# Patient Record
Sex: MALE | Race: WHITE | Employment: OTHER | URBAN - METROPOLITAN AREA
[De-identification: names, ages, dates, MRNs, and addresses within clinical notes are randomized per-mention and may not be internally consistent; named-entity substitution may affect disease eponyms.]

---

## 2017-01-17 ENCOUNTER — OFFICE VISIT (OUTPATIENT)
Dept: INTERNAL MEDICINE CLINIC | Age: 57
End: 2017-01-17

## 2017-01-17 VITALS
RESPIRATION RATE: 16 BRPM | OXYGEN SATURATION: 97 % | TEMPERATURE: 98.9 F | BODY MASS INDEX: 25.84 KG/M2 | HEART RATE: 78 BPM | SYSTOLIC BLOOD PRESSURE: 120 MMHG | WEIGHT: 195 LBS | DIASTOLIC BLOOD PRESSURE: 88 MMHG | HEIGHT: 73 IN

## 2017-01-17 DIAGNOSIS — R07.9 CHEST PAIN, UNSPECIFIED TYPE: Primary | ICD-10-CM

## 2017-01-17 RX ORDER — NAPROXEN 500 MG/1
TABLET ORAL
Qty: 20 TAB | Refills: 0 | Status: SHIPPED | OUTPATIENT
Start: 2017-01-17 | End: 2017-09-18

## 2017-01-17 NOTE — PATIENT INSTRUCTIONS
Musculoskeletal Chest Pain: Care Instructions  Your Care Instructions  Chest pain is not always a sign that something is wrong with your heart or that you have another serious problem. The doctor thinks your chest pain is caused by strained muscles or ligaments, inflamed chest cartilage, or another problem in your chest, rather than by your heart. You may need more tests to find the cause of your chest pain. Follow-up care is a key part of your treatment and safety. Be sure to make and go to all appointments, and call your doctor if you are having problems. Its also a good idea to know your test results and keep a list of the medicines you take. How can you care for yourself at home? · Take pain medicines exactly as directed. ¨ If the doctor gave you a prescription medicine for pain, take it as prescribed. ¨ If you are not taking a prescription pain medicine, ask your doctor if you can take an over-the-counter medicine. · Rest and protect the sore area. · Stop, change, or take a break from any activity that may be causing your pain or soreness. · Put ice or a cold pack on the sore area for 10 to 20 minutes at a time. Try to do this every 1 to 2 hours for the next 3 days (when you are awake) or until the swelling goes down. Put a thin cloth between the ice and your skin. · After 2 or 3 days, apply a heating pad set on low or a warm cloth to the area that hurts. Some doctors suggest that you go back and forth between hot and cold. · Do not wrap or tape your ribs for support. This may cause you to take smaller breaths, which could increase your risk of lung problems. · Mentholated creams such as Bengay or Icy Hot may soothe sore muscles. Follow the instructions on the package. · Follow your doctor's instructions for exercising. · Gentle stretching and massage may help you get better faster. Stretch slowly to the point just before pain begins, and hold the stretch for at least 15 to 30 seconds.  Do this 3 or 4 times a day. Stretch just after you have applied heat. · As your pain gets better, slowly return to your normal activities. Any increased pain may be a sign that you need to rest a while longer. When should you call for help? Call 911 anytime you think you may need emergency care. For example, call if:  · You have chest pain or pressure. This may occur with:  ¨ Sweating. ¨ Shortness of breath. ¨ Nausea or vomiting. ¨ Pain that spreads from the chest to the neck, jaw, or one or both shoulders or arms. ¨ Dizziness or lightheadedness. ¨ A fast or uneven pulse. After calling 911, chew 1 adult-strength aspirin. Wait for an ambulance. Do not try to drive yourself. · You have sudden chest pain and shortness of breath, or you cough up blood. Call your doctor now or seek immediate medical care if:  · You have any trouble breathing. · Your chest pain gets worse. · Your chest pain occurs consistently with exercise and is relieved by rest.  Watch closely for changes in your health, and be sure to contact your doctor if:  · Your chest pain does not get better after 1 week. Where can you learn more? Go to http://garfield-tyler.info/. Enter V293 in the search box to learn more about \"Musculoskeletal Chest Pain: Care Instructions. \"  Current as of: May 27, 2016  Content Version: 11.1  © 4194-5588 Healthwise, Incorporated. Care instructions adapted under license by SoundCloud (which disclaims liability or warranty for this information). If you have questions about a medical condition or this instruction, always ask your healthcare professional. [place] [address] any warranty or liability for your use of this information.

## 2017-01-17 NOTE — PROGRESS NOTES
1. Have you been to the ER, urgent care clinic since your last visit? Hospitalized since your last visit? No    2. Have you seen or consulted any other health care providers outside of the [address] [place] [address] since your last visit? Include any pap smears or colon screening.  No

## 2017-01-17 NOTE — MR AVS SNAPSHOT
Visit Information Date & Time Provider Department Dept. Phone Encounter #  
 1/17/2017  9:45 AM Clarice Waite MD [facility]ICINE OF [facility] Galan [phone] [id number] Upcoming Health Maintenance Date Due Hepatitis C Screening 1960 COLONOSCOPY 12/1/2017 DTaP/Tdap/Td series (2 - Td) 8/13/2022 Allergies as of 1/17/2017  Review Complete On: 11/11/2016 By: Clarice Waite MD  
  
 Severity Noted Reaction Type Reactions Morphine Medium 08/13/2012   Systemic Other (comments) Hallucinations Current Immunizations  Never Reviewed Name Date Influenza Vaccine M[place]ord Going) 9/11/2015 Influenza Vaccine (Quad) PF 9/23/2016 TDAP Vaccine 8/13/2012  6:03 PM  
  
 Not reviewed this visit You Were Diagnosed With   
  
 Codes Comments Chest pain, unspecified type    -  Primary ICD-10-CM: R07.9 ICD-9-CM: 786.50 Vitals BP Pulse Temp Resp Height(growth percentile) 120/88 (BP 1 Location: Right arm, BP Patient Position: Sitting) 78 98.9 °F (37.2 °C) (Tympanic) 16 6' 1\" (1.854 m) Weight(growth percentile) SpO2 BMI Smoking Status 195 lb (88.5 kg) 97% 25.73 kg/m2 Never Smoker BMI and BSA Data Body Mass Index Body Surface Area 25.73 kg/m 2 2.14 m 2 Preferred Pharmacy Pharmacy Name Phone [facility] [address] [place] [address] 32 Your Updated Medication List  
  
   
This list is accurate as of: 1/17/17 10:08 AM.  Always use your most recent med list.  
  
  
  
  
 EXCEDRIN MIGRAINE PO Take  by mouth three (3) times daily as needed. fluocinoNIDE 0.05 % topical cream  
Commonly known as:  LIDEX Apply small amount to thorax/arms/legs tid prn rash  
  
 hydrOXYzine HCl 25 mg tablet Commonly known as:  ATARAX  
1 tid prn rash  
  
 pravastatin 40 mg tablet Commonly known as:  PRAVACHOL Take 1 Tab by mouth nightly. We Performed the Following AMB POC EKG ROUTINE W/ 12 LEADS, INTER & REP [00722 CPT(R)] Introducing [facility]! Edwar Last introduces [employer] patient portal. Now you can access parts of your medical record, email your doctor's office, and request medication refills online. 1. In your internet browser, go to [email or web address] 2. Click on the First Time User? Click Here link in the Sign In box. You will see the New Member Sign Up page. 3. Enter your [employer] Access Code exactly as it appears below. You will not need to use this code after youve completed the sign-up process. If you do not sign up before the expiration date, you must request a new code. · [employer] Access Code: [email or web address] Expires: 4/17/2017  9:45 AM 
 
4. Enter the last four digits of your Social Security Number (xxxx) and Date of Birth (mm/dd/yyyy) as indicated and click Submit. You will be taken to the next sign-up page. 5. Create a [employer] ID. This will be your [employer] login ID and cannot be changed, so think of one that is secure and easy to remember. 6. Create a [employer] password. You can change your password at any time. 7. Enter your Password Reset Question and Answer. This can be used at a later time if you forget your password. 8. Enter your e-mail address. You will receive e-mail notification when new information is available in [address] 9. Click Sign Up. You can now view and download portions of your medical record. 10. Click the Download Summary menu link to download a portable copy of your medical information. If you have questions, please visit the Frequently Asked Questions section of the [employer] website. Remember, [employer] is NOT to be used for urgent needs. For medical emergencies, dial 911. Now available from your iPhone and Android! Please provide this summary of care documentation to your next provider. Your primary care clinician is listed as Nael Oconnor. If you have any questions after today's visit, please call [phone].

## 2017-01-17 NOTE — PROGRESS NOTES
HPI:   1 week of intermittent nonexertional CP w/o SOB, PUD sxs, cough; pain (L) ant chest and aggravated by positional change; lasts seconds at a time    ROS is otherwise negative. Past Medical History   Diagnosis Date    Generalized osteoarthrosis, involving multiple sites     Headache(784.0) April 2011    Low back pain     Lumbar postlaminectomy syndrome     Mixed hyperlipidemia     Neck pain      with headaches    Right arm pain April 2011       Past Surgical History   Procedure Laterality Date    Hx lumbar fusion       L4-L5 with instrumentation    Hx cervical fusion       C3-C4    Hx tonsil and adenoidectomy      Hx colonoscopy  12/2012     polyps, diverticula       Social History     Social History    Marital status:      Spouse name: N/A    Number of children: N/A    Years of education: N/A     Occupational History    Not on file. Social History Main Topics    Smoking status: Never Smoker    Smokeless tobacco: Not on file    Alcohol use 0.0 oz/week     0 Standard drinks or equivalent per week    Drug use: No    Sexual activity: Not on file     Other Topics Concern    Not on file     Social History Narrative       Allergies   Allergen Reactions    Morphine Other (comments)     Hallucinations       Family History   Problem Relation Age of Onset    Cancer Neg Hx     Diabetes Neg Hx     Heart Disease Neg Hx     Hypertension Neg Hx     Stroke Neg Hx        Current Outpatient Prescriptions   Medication Sig Dispense Refill    hydrOXYzine (ATARAX) 25 mg tablet 1 tid prn rash 40 Tab 1    fluocinoNIDE (LIDEX) 0.05 % topical cream Apply small amount to thorax/arms/legs tid prn rash 15 g 1    pravastatin (PRAVACHOL) 40 mg tablet Take 1 Tab by mouth nightly. 90 Tab 3    ASPIRIN/ACETAMINOPHEN/CAFFEINE (EXCEDRIN MIGRAINE PO) Take  by mouth three (3) times daily as needed.              Visit Vitals    /88 (BP 1 Location: Right arm, BP Patient Position: Sitting)    Pulse 78    Temp 98.9 °F (37.2 °C) (Tympanic)    Resp 16    Ht 6' 1\" (1.854 m)    Wt 195 lb (88.5 kg)    SpO2 97%    BMI 25.73 kg/m2       PE  Well nourished in NAD  HEENT:   OP: clear. Neck: supple w/o mass or bruits. Chest: clear. + anterior (L) tenderness reproducing pain  CV: RRR w/o m,r,g; pulses intact. Abd: soft, NT, w/o HSM or mass. Ext: w/o edema. Neuro: NF.  ECG: WNL    Assessment and Plan    Encounter Diagnoses   Name Primary?     Chest pain, unspecified type Yes   atypical CP - most likely MSK  Reassured; naprosyn 500 mg bid with food for 10 days  F/U worsening  OV 6 mos or prn  I have explained plan to patient and the patient verbalizes understanding

## 2017-09-18 ENCOUNTER — OFFICE VISIT (OUTPATIENT)
Dept: INTERNAL MEDICINE CLINIC | Age: 57
End: 2017-09-18

## 2017-09-18 VITALS
TEMPERATURE: 98 F | HEIGHT: 73 IN | SYSTOLIC BLOOD PRESSURE: 136 MMHG | BODY MASS INDEX: 25.58 KG/M2 | WEIGHT: 193 LBS | OXYGEN SATURATION: 98 % | DIASTOLIC BLOOD PRESSURE: 82 MMHG | RESPIRATION RATE: 16 BRPM | HEART RATE: 64 BPM

## 2017-09-18 DIAGNOSIS — E78.2 MIXED HYPERLIPIDEMIA: ICD-10-CM

## 2017-09-18 DIAGNOSIS — R11.2 NON-INTRACTABLE VOMITING WITH NAUSEA, UNSPECIFIED VOMITING TYPE: Primary | ICD-10-CM

## 2017-09-18 NOTE — PROGRESS NOTES
1. Have you been to the ER, urgent care clinic since your last visit? Hospitalized since your last visit? No    2. Have you seen or consulted any other health care providers outside of the [address] [place] [address] since your last visit? Include any pap smears or colon screening.  No

## 2017-09-18 NOTE — PATIENT INSTRUCTIONS
Nausea and Vomiting: Care Instructions  Your Care Instructions    When you are nauseated, you may feel weak and sweaty and notice a lot of saliva in your mouth. Nausea often leads to vomiting. Most of the time you do not need to worry about nausea and vomiting, but they can be signs of other illnesses. Two common causes of nausea and vomiting are stomach flu and food poisoning. Nausea and vomiting from viral stomach flu will usually start to improve within 24 hours. Nausea and vomiting from food poisoning may last from 12 to 48 hours. The doctor has checked you carefully, but problems can develop later. If you notice any problems or new symptoms, get medical treatment right away. Follow-up care is a key part of your treatment and safety. Be sure to make and go to all appointments, and call your doctor if you are having problems. It's also a good idea to know your test results and keep a list of the medicines you take. How can you care for yourself at home? · To prevent dehydration, drink plenty of fluids, enough so that your urine is light yellow or clear like water. Choose water and other caffeine-free clear liquids until you feel better. If you have kidney, heart, or liver disease and have to limit fluids, talk with your doctor before you increase the amount of fluids you drink. · Rest in bed until you feel better. · When you are able to eat, try clear soups, mild foods, and liquids until all symptoms are gone for 12 to 48 hours. Other good choices include dry toast, crackers, cooked cereal, and gelatin dessert, such as Jell-O. When should you call for help? Call 911 anytime you think you may need emergency care. For example, call if:  · You passed out (lost consciousness). Call your doctor now or seek immediate medical care if:  · You have symptoms of dehydration, such as:  ¨ Dry eyes and a dry mouth. ¨ Passing only a little dark urine.   ¨ Feeling thirstier than usual.  · You have new or worsening belly pain. · You have a new or higher fever. · You vomit blood or what looks like coffee grounds. Watch closely for changes in your health, and be sure to contact your doctor if:  · You have ongoing nausea and vomiting. · Your vomiting is getting worse. · Your vomiting lasts longer than 2 days. · You are not getting better as expected. Where can you learn more? Go to http://garfield-tyler.info/. Enter 25 [id number] in the search box to learn more about \"Nausea and Vomiting: Care Instructions. \"  Current as of: March 20, 2017  Content Version: 11.3  © 1857-5361 Id[employer]x. Care instructions adapted under license by Riptide IO (which disclaims liability or warranty for this information). If you have questions about a medical condition or this instruction, always ask your healthcare professional. [address] any warranty or liability for your use of this information.

## 2017-10-13 ENCOUNTER — OFFICE VISIT (OUTPATIENT)
Dept: INTERNAL MEDICINE CLINIC | Age: 57
End: 2017-10-13

## 2017-10-13 ENCOUNTER — HOSPITAL ENCOUNTER (OUTPATIENT)
Dept: LAB | Age: 57
Discharge: HOME | End: 2017-10-13
Payer: COMMERCIAL

## 2017-10-13 VITALS
WEIGHT: 197 LBS | HEART RATE: 71 BPM | BODY MASS INDEX: 26.11 KG/M2 | RESPIRATION RATE: 16 BRPM | DIASTOLIC BLOOD PRESSURE: 80 MMHG | OXYGEN SATURATION: 98 % | TEMPERATURE: 97.8 F | HEIGHT: 73 IN | SYSTOLIC BLOOD PRESSURE: 134 MMHG

## 2017-10-13 DIAGNOSIS — Z00.00 ROUTINE GENERAL MEDICAL EXAMINATION AT A HEALTH CARE FACILITY: ICD-10-CM

## 2017-10-13 DIAGNOSIS — R73.02 IGT (IMPAIRED GLUCOSE TOLERANCE): ICD-10-CM

## 2017-10-13 DIAGNOSIS — E78.2 MIXED HYPERLIPIDEMIA: ICD-10-CM

## 2017-10-13 DIAGNOSIS — Z00.00 ROUTINE GENERAL MEDICAL EXAMINATION AT A HEALTH CARE FACILITY: Primary | ICD-10-CM

## 2017-10-13 LAB
ALBUMIN SERPL-MCNC: 3.6 G/DL (ref 3.4–5)
ALBUMIN/GLOB SERPL: 1 {RATIO} (ref 0.8–1.7)
ALP SERPL-CCNC: 105 U/L (ref 45–117)
ALT SERPL-CCNC: 40 U/L (ref 16–61)
ANION GAP SERPL CALC-SCNC: 9 MMOL/L (ref 3–18)
AST SERPL-CCNC: 21 U/L (ref 15–37)
BASOPHILS # BLD: 0 K/UL (ref 0–0.1)
BASOPHILS NFR BLD: 0 % (ref 0–2)
BILIRUB SERPL-MCNC: 0.6 MG/DL (ref 0.2–1)
BUN SERPL-MCNC: 12 MG/DL (ref 7–18)
BUN/CREAT SERPL: 13 (ref 12–20)
CALCIUM SERPL-MCNC: 9 MG/DL (ref 8.5–10.1)
CHLORIDE SERPL-SCNC: 103 MMOL/L (ref 100–108)
CHOLEST SERPL-MCNC: 201 MG/DL
CO2 SERPL-SCNC: 26 MMOL/L (ref 21–32)
CREAT SERPL-MCNC: 0.93 MG/DL (ref 0.6–1.3)
DIFFERENTIAL METHOD BLD: NORMAL
EOSINOPHIL # BLD: 0.1 K/UL (ref 0–0.4)
EOSINOPHIL NFR BLD: 1 % (ref 0–5)
ERYTHROCYTE [DISTWIDTH] IN BLOOD BY AUTOMATED COUNT: 12.9 % (ref 11.6–14.5)
EST. AVERAGE GLUCOSE BLD GHB EST-MCNC: 120 MG/DL
GLOBULIN SER CALC-MCNC: 3.5 G/DL (ref 2–4)
GLUCOSE SERPL-MCNC: 98 MG/DL (ref 74–99)
HBA1C MFR BLD: 5.8 % (ref 4.2–5.6)
HCT VFR BLD AUTO: 43.7 % (ref 36–48)
HDLC SERPL-MCNC: 48 MG/DL (ref 40–60)
HDLC SERPL: 4.2 {RATIO} (ref 0–5)
HGB BLD-MCNC: 14.9 G/DL (ref 13–16)
LDLC SERPL CALC-MCNC: 123 MG/DL (ref 0–100)
LIPID PROFILE,FLP: ABNORMAL
LYMPHOCYTES # BLD: 2.6 K/UL (ref 0.9–3.6)
LYMPHOCYTES NFR BLD: 29 % (ref 21–52)
MCH RBC QN AUTO: 30.8 PG (ref 24–34)
MCHC RBC AUTO-ENTMCNC: 34.1 G/DL (ref 31–37)
MCV RBC AUTO: 90.5 FL (ref 74–97)
MONOCYTES # BLD: 0.7 K/UL (ref 0.05–1.2)
MONOCYTES NFR BLD: 8 % (ref 3–10)
NEUTS SEG # BLD: 5.5 K/UL (ref 1.8–8)
NEUTS SEG NFR BLD: 62 % (ref 40–73)
PLATELET # BLD AUTO: 300 K/UL (ref 135–420)
PMV BLD AUTO: 9.6 FL (ref 9.2–11.8)
POTASSIUM SERPL-SCNC: 4.7 MMOL/L (ref 3.5–5.5)
PROT SERPL-MCNC: 7.1 G/DL (ref 6.4–8.2)
PSA SERPL-MCNC: 1.7 NG/ML (ref 0–4)
RBC # BLD AUTO: 4.83 M/UL (ref 4.7–5.5)
SODIUM SERPL-SCNC: 138 MMOL/L (ref 136–145)
TRIGL SERPL-MCNC: 150 MG/DL (ref ?–150)
VLDLC SERPL CALC-MCNC: 30 MG/DL
WBC # BLD AUTO: 8.9 K/UL (ref 4.6–13.2)

## 2017-10-13 PROCEDURE — 85025 COMPLETE CBC W/AUTO DIFF WBC: CPT | Performed by: INTERNAL MEDICINE

## 2017-10-13 PROCEDURE — 36415 COLL VENOUS BLD VENIPUNCTURE: CPT | Performed by: INTERNAL MEDICINE

## 2017-10-13 PROCEDURE — 83036 HEMOGLOBIN GLYCOSYLATED A1C: CPT | Performed by: INTERNAL MEDICINE

## 2017-10-13 PROCEDURE — 84153 ASSAY OF PSA TOTAL: CPT | Performed by: INTERNAL MEDICINE

## 2017-10-13 PROCEDURE — 80053 COMPREHEN METABOLIC PANEL: CPT | Performed by: INTERNAL MEDICINE

## 2017-10-13 PROCEDURE — 80061 LIPID PANEL: CPT | Performed by: INTERNAL MEDICINE

## 2017-10-13 RX ORDER — PRAVASTATIN SODIUM 40 MG/1
40 TABLET ORAL
Qty: 90 TAB | Refills: 3 | Status: SHIPPED | OUTPATIENT
Start: 2017-10-13 | End: 2018-10-16 | Stop reason: SDUPTHER

## 2017-10-13 NOTE — PATIENT INSTRUCTIONS

## 2017-10-13 NOTE — PROGRESS NOTES
1. Have you been to the ER, urgent care clinic since your last visit? Hospitalized since your last visit? No    2. Have you seen or consulted any other health care providers outside of the [address] [place] [address] since your last visit? Include any pap smears or colon screening.  No

## 2017-10-13 NOTE — MR AVS SNAPSHOT
Visit Information Date & Time Provider Department Dept. Phone Encounter #  
 10/13/2017  9:30 AM Ariela Chavez MD [facility] INTERNAL MEDICINE OF [address] [place] [address] Drive [phone] [id number] Follow-up Instructions Return if symptoms worsen or fail to improve. Follow-up and Disposition History Upcoming Health Maintenance Date Due Hepatitis C Screening 1960 COLONOSCOPY 12/1/2017 DTaP/Tdap/Td series (2 - Td) 8/13/2022 Allergies as of 10/13/2017  Review Complete On: 10/13/2017 By: Ariela Chavez MD  
  
 Severity Noted Reaction Type Reactions Morphine Medium 08/13/2012   Systemic Other (comments) Hallucinations Current Immunizations  Never Reviewed Name Date Influenza Vaccine 10/11/2017 Influenza Vaccine Seretha Cave) 9/11/2015 Influenza Vaccine (Quad) PF 9/23/2016 TDAP Vaccine 8/13/2012  6:03 PM  
  
 Not reviewed this visit You Were Diagnosed With   
  
 Codes Comments Routine general medical examination at a health care facility    -  Primary ICD-10-CM: Z00.00 ICD-9-CM: V70.0 IGT (impaired glucose tolerance)     ICD-10-CM: R73.02 
ICD-9-CM: 790.22 Mixed hyperlipidemia     ICD-10-CM: E78.2 ICD-9-CM: 272.2 Vitals BP Pulse Temp Resp Height(growth percentile) 134/80 (BP 1 Location: Right arm, BP Patient Position: Sitting) 71 97.8 °F (36.6 °C) (Tympanic) 16 6' 1\" (1.854 m) Weight(growth percentile) SpO2 BMI Smoking Status 197 lb (89.4 kg) 98% 25.99 kg/m2 Never Smoker Vitals History BMI and BSA Data Body Mass Index Body Surface Area  
 25.99 kg/m 2 2.15 m 2 Preferred Pharmacy Pharmacy Name Phone [facility] [address] Your Updated Medication List  
  
   
This list is accurate as of: 10/13/17  9:47 AM.  Always use your most recent med list.  
  
  
  
  
 EXCEDRIN MIGRAINE PO  
 Take  by mouth three (3) times daily as needed. pravastatin 40 mg tablet Commonly known as:  PRAVACHOL Take 1 Tab by mouth nightly. Prescriptions Sent to Pharmacy Refills  
 pravastatin (PRAVACHOL) 40 mg tablet 3 Sig: Take 1 Tab by mouth nightly. Class: Normal  
 Pharmacy: [address], [address] [facility] #: [phone] Route: Oral  
  
Follow-up Instructions Return if symptoms worsen or fail to improve. To-Do List   
 10/13/2017 Lab:  CBC WITH AUTOMATED DIFF   
  
 10/13/2017 Lab:  HEMOGLOBIN A1C WITH EAG   
  
 10/13/2017 Lab:  LIPID PANEL   
  
 10/13/2017 Lab:  METABOLIC PANEL, COMPREHENSIVE   
  
 10/13/2017 Lab:  PSA SCREENING (SCREENING) Patient Instructions High Cholesterol: Care Instructions Your Care Instructions Cholesterol is a type of fat in your blood. It is needed for many body functions, such as making new cells. Cholesterol is made by your body. It also comes from food you eat. High cholesterol means that you have too much of the fat in your blood. This raises your risk of a heart attack and stroke. LDL and HDL are part of your total cholesterol. LDL is the \"bad\" cholesterol. High LDL can raise your risk for heart disease, heart attack, and stroke. HDL is the \"good\" cholesterol. It helps clear bad cholesterol from the body. High HDL is linked with a lower risk of heart disease, heart attack, and stroke. Your cholesterol levels help your doctor find out your risk for having a heart attack or stroke. You and your doctor can talk about whether you need to lower your risk and what treatment is best for you. A heart-healthy lifestyle along with medicines can help lower your cholesterol and your risk. The way you choose to lower your risk will depend on how high your risk is for heart attack and stroke. It will also depend on how you feel about taking medicines. Follow-up care is a key part of your treatment and safety. Be sure to make and go to all appointments, and call your doctor if you are having problems. It's also a good idea to know your test results and keep a list of the medicines you take. How can you care for yourself at home? · Eat a variety of foods every day. Good choices include fruits, vegetables, whole grains (like oatmeal), dried beans and peas, nuts and seeds, soy products (like tofu), and fat-free or low-fat dairy products. · Replace butter, margarine, and hydrogenated or partially hydrogenated oils with olive and canola oils. (Canola oil margarine without trans fat is fine.) · Replace red meat with fish, poultry, and soy protein (like tofu). · Limit processed and packaged foods like chips, crackers, and cookies. · Bake, broil, or steam foods. Don't julian them. · Be physically active. Get at least 30 minutes of exercise on most days of the week. Walking is a good choice. You also may want to do other activities, such as running, swimming, cycling, or playing tennis or team sports. · Stay at a healthy weight or lose weight by making the changes in eating and physical activity listed above. Losing just a small amount of weight, even 5 to 10 pounds, can reduce your risk for having a heart attack or stroke. · Do not smoke. When should you call for help? Watch closely for changes in your health, and be sure to contact your doctor if: 
· You need help making lifestyle changes. · You have questions about your medicine. Where can you learn more? Go to http://garfield-tyler.info/. Enter Q856 in the search box to learn more about \"High Cholesterol: Care Instructions. \" Current as of: April 3, 2017 Content Version: 11.3 © 0249-2299 ZeaVision. Care instructions adapted under license by IntegenX (which disclaims liability or warranty for this information).  If you have questions about a medical condition or this instruction, always ask your healthcare professional. [address]  any warranty or liability for your use of this information. Introducing [facility]! [facility] introduces [employer] patient portal. Now you can access parts of your medical record, email your doctor's office, and request medication refills online. 1. In your internet browser, go to [email or web address] 2. Click on the First Time User? Click Here link in the Sign In box. You will see the New Member Sign Up page. 3. Enter your Mar[employer]ia Access Code exactly as it appears below. You will not need to use this code after youve completed the sign-up process. If you do not sign up before the expiration date, you must request a new code. · Mar[employer]ia Access Code: [email or web address] Expires: 12/17/2017 11:38 AM 
 
4. Enter the last four digits of your Social Security Number (xxxx) and Date of Birth (mm/dd/yyyy) as indicated and click Submit. You will be taken to the next sign-up page. 5. Create a Maraquia ID. This will be your Mar[employer]ia login ID and cannot be changed, so think of one that is secure and easy to remember. 6. Create a Maraquia password. You can change your password at any time. 7. Enter your Password Reset Question and Answer. This can be used at a later time if you forget your password. 8. Enter your e-mail address. You will receive e-mail notification when new information is available in [address] 9. Click Sign Up. You can now view and download portions of your medical record. 10. Click the Download Summary menu link to download a portable copy of your medical information. If you have questions, please visit the Frequently Asked Questions section of the Mar[employer]ia website. Remember, Mar[employer]ia is NOT to be used for urgent needs. For medical emergencies, dial 911. Now available from your iPhone and Android! Please provide this summary of care documentation to your next provider. Your primary care clinician is listed as Jone Arizmendi. If you have any questions after today's visit, please call [phone].

## 2018-04-20 ENCOUNTER — HOSPITAL ENCOUNTER (OUTPATIENT)
Dept: LAB | Age: 58
Discharge: HOME | End: 2018-04-20
Payer: COMMERCIAL

## 2018-04-20 ENCOUNTER — OFFICE VISIT (OUTPATIENT)
Dept: INTERNAL MEDICINE CLINIC | Age: 58
End: 2018-04-20

## 2018-04-20 VITALS
BODY MASS INDEX: 26.04 KG/M2 | OXYGEN SATURATION: 98 % | DIASTOLIC BLOOD PRESSURE: 70 MMHG | HEIGHT: 73 IN | HEART RATE: 80 BPM | WEIGHT: 196.5 LBS | TEMPERATURE: 97.6 F | SYSTOLIC BLOOD PRESSURE: 120 MMHG | RESPIRATION RATE: 16 BRPM

## 2018-04-20 DIAGNOSIS — E78.2 MIXED HYPERLIPIDEMIA: ICD-10-CM

## 2018-04-20 DIAGNOSIS — M15.9 GENERALIZED OSTEOARTHROSIS, INVOLVING MULTIPLE SITES: ICD-10-CM

## 2018-04-20 DIAGNOSIS — R53.82 CHRONIC FATIGUE: ICD-10-CM

## 2018-04-20 DIAGNOSIS — E78.2 MIXED HYPERLIPIDEMIA: Primary | ICD-10-CM

## 2018-04-20 LAB
ALBUMIN SERPL-MCNC: 3.9 G/DL (ref 3.4–5)
ALBUMIN/GLOB SERPL: 1.4 {RATIO} (ref 0.8–1.7)
ALP SERPL-CCNC: 90 U/L (ref 45–117)
ALT SERPL-CCNC: 40 U/L (ref 16–61)
ANION GAP SERPL CALC-SCNC: 4 MMOL/L (ref 3–18)
AST SERPL-CCNC: 19 U/L (ref 15–37)
BASOPHILS # BLD: 0 K/UL (ref 0–0.1)
BASOPHILS NFR BLD: 0 % (ref 0–2)
BILIRUB SERPL-MCNC: 0.6 MG/DL (ref 0.2–1)
BUN SERPL-MCNC: 11 MG/DL (ref 7–18)
BUN/CREAT SERPL: 12 (ref 12–20)
CALCIUM SERPL-MCNC: 9 MG/DL (ref 8.5–10.1)
CHLORIDE SERPL-SCNC: 106 MMOL/L (ref 100–108)
CHOLEST SERPL-MCNC: 201 MG/DL
CO2 SERPL-SCNC: 29 MMOL/L (ref 21–32)
CREAT SERPL-MCNC: 0.91 MG/DL (ref 0.6–1.3)
DIFFERENTIAL METHOD BLD: ABNORMAL
EOSINOPHIL # BLD: 0.2 K/UL (ref 0–0.4)
EOSINOPHIL NFR BLD: 3 % (ref 0–5)
ERYTHROCYTE [DISTWIDTH] IN BLOOD BY AUTOMATED COUNT: 13.2 % (ref 11.6–14.5)
GLOBULIN SER CALC-MCNC: 2.8 G/DL (ref 2–4)
GLUCOSE SERPL-MCNC: 93 MG/DL (ref 74–99)
HCT VFR BLD AUTO: 43.5 % (ref 36–48)
HDLC SERPL-MCNC: 47 MG/DL (ref 40–60)
HDLC SERPL: 4.3 {RATIO} (ref 0–5)
HGB BLD-MCNC: 14.6 G/DL (ref 13–16)
LDLC SERPL CALC-MCNC: 125.2 MG/DL (ref 0–100)
LIPID PROFILE,FLP: ABNORMAL
LYMPHOCYTES # BLD: 2.1 K/UL (ref 0.9–3.6)
LYMPHOCYTES NFR BLD: 27 % (ref 21–52)
MCH RBC QN AUTO: 30 PG (ref 24–34)
MCHC RBC AUTO-ENTMCNC: 33.6 G/DL (ref 31–37)
MCV RBC AUTO: 89.3 FL (ref 74–97)
MONOCYTES # BLD: 0.8 K/UL (ref 0.05–1.2)
MONOCYTES NFR BLD: 11 % (ref 3–10)
NEUTS SEG # BLD: 4.7 K/UL (ref 1.8–8)
NEUTS SEG NFR BLD: 59 % (ref 40–73)
PLATELET # BLD AUTO: 243 K/UL (ref 135–420)
PMV BLD AUTO: 9.3 FL (ref 9.2–11.8)
POTASSIUM SERPL-SCNC: 4.1 MMOL/L (ref 3.5–5.5)
PROT SERPL-MCNC: 6.7 G/DL (ref 6.4–8.2)
RBC # BLD AUTO: 4.87 M/UL (ref 4.7–5.5)
SODIUM SERPL-SCNC: 139 MMOL/L (ref 136–145)
TRIGL SERPL-MCNC: 144 MG/DL (ref ?–150)
TSH SERPL DL<=0.05 MIU/L-ACNC: 0.74 UIU/ML (ref 0.36–3.74)
VLDLC SERPL CALC-MCNC: 28.8 MG/DL
WBC # BLD AUTO: 7.9 K/UL (ref 4.6–13.2)

## 2018-04-20 PROCEDURE — 36415 COLL VENOUS BLD VENIPUNCTURE: CPT | Performed by: INTERNAL MEDICINE

## 2018-04-20 PROCEDURE — 85025 COMPLETE CBC W/AUTO DIFF WBC: CPT | Performed by: INTERNAL MEDICINE

## 2018-04-20 PROCEDURE — 80061 LIPID PANEL: CPT | Performed by: INTERNAL MEDICINE

## 2018-04-20 PROCEDURE — 84443 ASSAY THYROID STIM HORMONE: CPT | Performed by: INTERNAL MEDICINE

## 2018-04-20 PROCEDURE — 80053 COMPREHEN METABOLIC PANEL: CPT | Performed by: INTERNAL MEDICINE

## 2018-04-20 NOTE — LETTER
NOTIFICATION RETURN TO WORK / SCHOOL 
 
4/20/2018 8:25 AM 
 
Mr. Jordy Yung [address] [facility] [address] To Whom It May Concern: 
 
Jordy Yung is currently under the care of Juany Caputo. He will return to work/school on: April 23, 2018 If there are questions or concerns please have the patient contact our office.  
 
 
 
Sincerely, 
 
 
 
 
Ti Cifuentes MD

## 2018-04-20 NOTE — PROGRESS NOTES
1. Have you been to the ER, urgent care clinic since your last visit? Hospitalized since your last visit? No    2. Have you seen or consulted any other health care providers outside of the [address] [place] [address] since your last visit? Include any pap smears or colon screening.  No

## 2018-04-20 NOTE — PATIENT INSTRUCTIONS
High Cholesterol: Care Instructions  Your Care Instructions    Cholesterol is a type of fat in your blood. It is needed for many body functions, such as making new cells. Cholesterol is made by your body. It also comes from food you eat. High cholesterol means that you have too much of the fat in your blood. This raises your risk of a heart attack and stroke. LDL and HDL are part of your total cholesterol. LDL is the \"bad\" cholesterol. High LDL can raise your risk for heart disease, heart attack, and stroke. HDL is the \"good\" cholesterol. It helps clear bad cholesterol from the body. High HDL is linked with a lower risk of heart disease, heart attack, and stroke. Your cholesterol levels help your doctor find out your risk for having a heart attack or stroke. You and your doctor can talk about whether you need to lower your risk and what treatment is best for you. A heart-healthy lifestyle along with medicines can help lower your cholesterol and your risk. The way you choose to lower your risk will depend on how high your risk is for heart attack and stroke. It will also depend on how you feel about taking medicines. Follow-up care is a key part of your treatment and safety. Be sure to make and go to all appointments, and call your doctor if you are having problems. It's also a good idea to know your test results and keep a list of the medicines you take. How can you care for yourself at home? · Eat a variety of foods every day. Good choices include fruits, vegetables, whole grains (like oatmeal), dried beans and peas, nuts and seeds, soy products (like tofu), and fat-free or low-fat dairy products. · Replace butter, margarine, and hydrogenated or partially hydrogenated oils with olive and canola oils. (Canola oil margarine without trans fat is fine.)  · Replace red meat with fish, poultry, and soy protein (like tofu). · Limit processed and packaged foods like chips, crackers, and cookies.   · Bake, broil, or steam foods. Don't julian them. · Be physically active. Get at least 30 minutes of exercise on most days of the week. Walking is a good choice. You also may want to do other activities, such as running, swimming, cycling, or playing tennis or team sports. · Stay at a healthy weight or lose weight by making the changes in eating and physical activity listed above. Losing just a small amount of weight, even 5 to 10 pounds, can reduce your risk for having a heart attack or stroke. · Do not smoke. When should you call for help? Watch closely for changes in your health, and be sure to contact your doctor if:  ? · You need help making lifestyle changes. ? · You have questions about your medicine. Where can you learn more? Go to http://garfield-tyler.info/. Enter G725 in the search box to learn more about \"High Cholesterol: Care Instructions. \"  Current as of: September 21, 2016  Content Version: 11.4  © 3978-3315 [employer]. Care instructions adapted under license by [employer] (which disclaims liability or warranty for this information). If you have questions about a medical condition or this instruction, always ask your healthcare professional. [address] any warranty or liability for your use of this information. Body Mass Index: Care Instructions  Your Care Instructions    Body mass index (BMI) can help you see if your weight is raising your risk for health problems. It uses a formula to compare how much you weigh with how tall you are. · A BMI lower than 18.5 is considered underweight. · A BMI between 18.5 and 24.9 is considered healthy. · A BMI between 25 and 29.9 is considered overweight. A BMI of 30 or higher is considered obese. If your BMI is in the normal range, it means that you have a lower risk for weight-related health problems.  If your BMI is in the overweight or obese range, you may be at increased risk for weight-related health problems, such as high blood pressure, heart disease, stroke, arthritis or joint pain, and diabetes. If your BMI is in the underweight range, you may be at increased risk for health problems such as fatigue, lower protection (immunity) against illness, muscle loss, bone loss, hair loss, and hormone problems. BMI is just one measure of your risk for weight-related health problems. You may be at higher risk for health problems if you are not active, you eat an unhealthy diet, or you drink too much alcohol or use tobacco products. Follow-up care is a key part of your treatment and safety. Be sure to make and go to all appointments, and call your doctor if you are having problems. It's also a good idea to know your test results and keep a list of the medicines you take. How can you care for yourself at home? · Practice healthy eating habits. This includes eating plenty of fruits, vegetables, whole grains, lean protein, and low-fat dairy. · If your doctor recommends it, get more exercise. Walking is a good choice. Bit by bit, increase the amount you walk every day. Try for at least 30 minutes on most days of the week. · Do not smoke. Smoking can increase your risk for health problems. If you need help quitting, talk to your doctor about stop-smoking programs and medicines. These can increase your chances of quitting for good. · Limit alcohol to 2 drinks a day for men and 1 drink a day for women. Too much alcohol can cause health problems. If you have a BMI higher than 25  · Your doctor may do other tests to check your risk for weight-related health problems. This may include measuring the distance around your waist. A waist measurement of more than 40 inches in men or 35 inches in women can increase the risk of weight-related health problems. · Talk with your doctor about steps you can take to stay healthy or improve your health.  You may need to make lifestyle changes to lose weight and stay healthy, such as changing your diet and getting regular exercise. If you have a BMI lower than 18.5  · Your doctor may do other tests to check your risk for health problems. · Talk with your doctor about steps you can take to stay healthy or improve your health. You may need to make lifestyle changes to gain or maintain weight and stay healthy, such as getting more healthy foods in your diet and doing exercises to build muscle. Where can you learn more? Go to http://garfield-tyler.info/. Enter S176 in the search box to learn more about \"Body Mass Index: Care Instructions. \"  Current as of: October 13, 2016  Content Version: 11.4  © 5136-5309 [employer]. Care instructions adapted under license by [employer] (which disclaims liability or warranty for this information). If you have questions about a medical condition or this instruction, always ask your healthcare professional. [address] any warranty or liability for your use of this information.

## 2018-04-20 NOTE — PROGRESS NOTES
HPI:   F/u visit for routine evaluation of  hyperlipidemia, OA  Chol 201 Oct 2017  Chronic fatigue; denies marie depression/anxiety  w/o chest pain/abd. discomfort; no dyspnea, cough or pedal edema; denies constitutional complaints of fever, night sweats or wt loss; no evidence of GI/ hemorrhage; no polyuria/polydipsia. Activity is age appropriate despite chronic pain    ROS is otherwise negative. Past Medical History:   Diagnosis Date    Generalized osteoarthrosis, involving multiple sites     Headache(784.0) April 2011    Low back pain     Lumbar postlaminectomy syndrome     Mixed hyperlipidemia     Neck pain     with headaches    Right arm pain April 2011       Past Surgical History:   Procedure Laterality Date    HX CERVICAL FUSION      C3-C4    HX COLONOSCOPY  12/2012; 1/2018    polyps, diverticula; 2nd colo neg    HX LUMBAR FUSION      L4-L5 with instrumentation    HX TONSIL AND ADENOIDECTOMY         Social History     Social History    Marital status:      Spouse name: N/A    Number of children: N/A    Years of education: N/A     Occupational History    Not on file. Social History Main Topics    Smoking status: Never Smoker    Smokeless tobacco: Former User    Alcohol use 0.0 oz/week     0 Standard drinks or equivalent per week    Drug use: No    Sexual activity: Not on file     Other Topics Concern    Not on file     Social History Narrative       Allergies   Allergen Reactions    Morphine Other (comments)     Hallucinations       Family History   Problem Relation Age of Onset    Cancer Neg Hx     Diabetes Neg Hx     Heart Disease Neg Hx     Hypertension Neg Hx     Stroke Neg Hx        Current Outpatient Prescriptions   Medication Sig Dispense Refill    pravastatin (PRAVACHOL) 40 mg tablet Take 1 Tab by mouth nightly. 90 Tab 3    ASPIRIN/ACETAMINOPHEN/CAFFEINE (EXCEDRIN MIGRAINE PO) Take  by mouth three (3) times daily as needed.              Visit Vitals    BP 120/70 (BP 1 Location: Left arm, BP Patient Position: Sitting)    Pulse 80    Temp 97.6 °F (36.4 °C) (Tympanic)    Resp 16    Ht 6' 1\" (1.854 m)    Wt 196 lb 8 oz (89.1 kg)    SpO2 98%    BMI 25.93 kg/m2       PE  Well nourished in NAD  HEENT:  OP: clear. Neck: supple w/o mass or bruits. Chest: clear. CV: RRR w/o m,r,g; pulses intact. Abd: soft, NT, w/o HSM or mass. Ext: w/o edema. Neuro: NF. Assessment and Plan    Encounter Diagnoses   Name Primary?  Mixed hyperlipidemia Yes    Generalized osteoarthrosis, involving multiple sites    hyperlipidemia - continue dietary/statin rx  OA/disc disease - stable  Fatigue - nl colo 1/2018; nl PSA 10/2017 - suspect multifactorial in cause (?depression); repeat labs  OV 6 mos or prn  I have explained plan to patient and the patient verbalizes understanding      Discussed the patient's BMI with him. The BMI follow up plan is as follows:     dietary management education, guidance, and counseling  encourage exercise  monitor weight  prescribed dietary intake    An After Visit Summary was printed and given to the patient.

## 2018-10-03 DIAGNOSIS — E78.2 MIXED HYPERLIPIDEMIA: Primary | ICD-10-CM

## 2018-10-03 DIAGNOSIS — Z12.5 SCREENING FOR PROSTATE CANCER: ICD-10-CM

## 2018-10-16 ENCOUNTER — OFFICE VISIT (OUTPATIENT)
Dept: INTERNAL MEDICINE CLINIC | Age: 58
End: 2018-10-16

## 2018-10-16 ENCOUNTER — HOSPITAL ENCOUNTER (OUTPATIENT)
Dept: LAB | Age: 58
Discharge: HOME | End: 2018-10-16
Payer: COMMERCIAL

## 2018-10-16 VITALS
BODY MASS INDEX: 25.98 KG/M2 | SYSTOLIC BLOOD PRESSURE: 134 MMHG | RESPIRATION RATE: 16 BRPM | WEIGHT: 196 LBS | HEIGHT: 73 IN | TEMPERATURE: 97.4 F | HEART RATE: 70 BPM | OXYGEN SATURATION: 97 % | DIASTOLIC BLOOD PRESSURE: 80 MMHG

## 2018-10-16 DIAGNOSIS — Z23 ENCOUNTER FOR IMMUNIZATION: ICD-10-CM

## 2018-10-16 DIAGNOSIS — E78.2 MIXED HYPERLIPIDEMIA: ICD-10-CM

## 2018-10-16 DIAGNOSIS — Z00.00 ROUTINE GENERAL MEDICAL EXAMINATION AT A HEALTH CARE FACILITY: Primary | ICD-10-CM

## 2018-10-16 DIAGNOSIS — Z12.5 SCREENING FOR PROSTATE CANCER: ICD-10-CM

## 2018-10-16 LAB
ALT SERPL-CCNC: 33 U/L (ref 16–61)
ANION GAP SERPL CALC-SCNC: 7 MMOL/L (ref 3–18)
AST SERPL-CCNC: 17 U/L (ref 15–37)
BUN SERPL-MCNC: 19 MG/DL (ref 7–18)
BUN/CREAT SERPL: 19 (ref 12–20)
CALCIUM SERPL-MCNC: 9.5 MG/DL (ref 8.5–10.1)
CHLORIDE SERPL-SCNC: 104 MMOL/L (ref 100–108)
CHOLEST SERPL-MCNC: 168 MG/DL
CO2 SERPL-SCNC: 28 MMOL/L (ref 21–32)
CREAT SERPL-MCNC: 0.99 MG/DL (ref 0.6–1.3)
GLUCOSE SERPL-MCNC: 98 MG/DL (ref 74–99)
HDLC SERPL-MCNC: 50 MG/DL (ref 40–60)
HDLC SERPL: 3.4 {RATIO} (ref 0–5)
LDLC SERPL CALC-MCNC: 101.4 MG/DL (ref 0–100)
LIPID PROFILE,FLP: ABNORMAL
POTASSIUM SERPL-SCNC: 4.7 MMOL/L (ref 3.5–5.5)
PSA SERPL-MCNC: 0.8 NG/ML (ref 0–4)
SODIUM SERPL-SCNC: 139 MMOL/L (ref 136–145)
TRIGL SERPL-MCNC: 83 MG/DL (ref ?–150)
VLDLC SERPL CALC-MCNC: 16.6 MG/DL

## 2018-10-16 PROCEDURE — 84450 TRANSFERASE (AST) (SGOT): CPT | Performed by: INTERNAL MEDICINE

## 2018-10-16 PROCEDURE — 36415 COLL VENOUS BLD VENIPUNCTURE: CPT | Performed by: INTERNAL MEDICINE

## 2018-10-16 PROCEDURE — 80048 BASIC METABOLIC PNL TOTAL CA: CPT | Performed by: INTERNAL MEDICINE

## 2018-10-16 PROCEDURE — 84153 ASSAY OF PSA TOTAL: CPT | Performed by: INTERNAL MEDICINE

## 2018-10-16 PROCEDURE — 80061 LIPID PANEL: CPT | Performed by: INTERNAL MEDICINE

## 2018-10-16 PROCEDURE — 84460 ALANINE AMINO (ALT) (SGPT): CPT | Performed by: INTERNAL MEDICINE

## 2018-10-16 RX ORDER — PRAVASTATIN SODIUM 40 MG/1
40 TABLET ORAL
Qty: 90 TAB | Refills: 3 | Status: SHIPPED | OUTPATIENT
Start: 2018-10-16 | End: 2019-10-17 | Stop reason: SDUPTHER

## 2018-10-16 NOTE — PROGRESS NOTES
1. Have you been to the ER, urgent care clinic since your last visit? Hospitalized since your last visit? Yes When: sept [facility] Reason for visit: rib pain    2. Have you seen or consulted any other health care providers outside of the [address] [place] [address] since your last visit? Include any pap smears or colon screening.  No

## 2018-10-16 NOTE — PROGRESS NOTES
HPI:   Check up  Hx notable for hyperlipidemia  w/o chest pain/abd. discomfort; no dyspnea, cough or pedal edema; denies constitutional complaints of fever, night sweats or wt loss; no evidence of GI/ hemorrhage; no polyuria/polydipsia. Activity is age appropriate despite chronic pain    ROS is otherwise negative. Past Medical History:   Diagnosis Date    Generalized osteoarthrosis, involving multiple sites     Headache(784.0) April 2011    Low back pain     Lumbar postlaminectomy syndrome     Mixed hyperlipidemia     Neck pain     with headaches    Right arm pain April 2011       Past Surgical History:   Procedure Laterality Date    HX CERVICAL FUSION      C3-C4    HX COLONOSCOPY  12/2012; 1/2018    polyps, diverticula; 2nd colo neg    HX LUMBAR FUSION      L4-L5 with instrumentation    HX TONSIL AND ADENOIDECTOMY         Social History     Social History    Marital status:      Spouse name: N/A    Number of children: N/A    Years of education: N/A     Occupational History    Not on file. Social History Main Topics    Smoking status: Never Smoker    Smokeless tobacco: Former User    Alcohol use 0.0 oz/week     0 Standard drinks or equivalent per week    Drug use: No    Sexual activity: Not on file     Other Topics Concern    Not on file     Social History Narrative       Allergies   Allergen Reactions    Morphine Other (comments)     Hallucinations       Family History   Problem Relation Age of Onset    Cancer Neg Hx     Diabetes Neg Hx     Heart Disease Neg Hx     Hypertension Neg Hx     Stroke Neg Hx        Current Outpatient Prescriptions   Medication Sig Dispense Refill    pravastatin (PRAVACHOL) 40 mg tablet Take 1 Tab by mouth nightly. 90 Tab 3    ASPIRIN/ACETAMINOPHEN/CAFFEINE (EXCEDRIN MIGRAINE PO) Take  by mouth three (3) times daily as needed.              Visit Vitals    /80 (BP 1 Location: Right arm, BP Patient Position: Sitting)    Pulse 70    Temp 97.4 °F (36.3 °C) (Tympanic)    Resp 16    Ht 6' 1\" (1.854 m)    Wt 196 lb (88.9 kg)    SpO2 97%    BMI 25.86 kg/m2       PE  Well nourished in NAD  HEENT:  OP: clear. Neck: supple w/o mass or bruits. Chest: clear. CV: RRR w/o m,r,g; pulses intact. Abd: soft, NT, w/o HSM or mass. Rectal: heme neg; prostate 3 FBS and smooth  Ext: w/o edema. Neuro: NF. Assessment and Plan    Encounter Diagnoses   Name Primary?  Routine general medical examination at a health care facility Yes    Mixed hyperlipidemia    hyperlipidemia - labs soon to assess  No change in rx  Flu vaccine advised  Rake done 1/2018  OV 12 mos or prn  I have explained plan to patient and the patient verbalizes understanding            Discussed the patient's BMI with him. The BMI follow up plan is as follows:     dietary management education, guidance, and counseling  encourage exercise  monitor weight  prescribed dietary intake    An After Visit Summary was printed and given to the patient.

## 2018-10-16 NOTE — PATIENT INSTRUCTIONS
High Cholesterol: Care Instructions  Your Care Instructions    Cholesterol is a type of fat in your blood. It is needed for many body functions, such as making new cells. Cholesterol is made by your body. It also comes from food you eat. High cholesterol means that you have too much of the fat in your blood. This raises your risk of a heart attack and stroke. LDL and HDL are part of your total cholesterol. LDL is the \"bad\" cholesterol. High LDL can raise your risk for heart disease, heart attack, and stroke. HDL is the \"good\" cholesterol. It helps clear bad cholesterol from the body. High HDL is linked with a lower risk of heart disease, heart attack, and stroke. Your cholesterol levels help your doctor find out your risk for having a heart attack or stroke. You and your doctor can talk about whether you need to lower your risk and what treatment is best for you. A heart-healthy lifestyle along with medicines can help lower your cholesterol and your risk. The way you choose to lower your risk will depend on how high your risk is for heart attack and stroke. It will also depend on how you feel about taking medicines. Follow-up care is a key part of your treatment and safety. Be sure to make and go to all appointments, and call your doctor if you are having problems. It's also a good idea to know your test results and keep a list of the medicines you take. How can you care for yourself at home? · Eat a variety of foods every day. Good choices include fruits, vegetables, whole grains (like oatmeal), dried beans and peas, nuts and seeds, soy products (like tofu), and fat-free or low-fat dairy products. · Replace butter, margarine, and hydrogenated or partially hydrogenated oils with olive and canola oils. (Canola oil margarine without trans fat is fine.)  · Replace red meat with fish, poultry, and soy protein (like tofu). · Limit processed and packaged foods like chips, crackers, and cookies.   · Bake, broil, or steam foods. Don't julian them. · Be physically active. Get at least 30 minutes of exercise on most days of the week. Walking is a good choice. You also may want to do other activities, such as running, swimming, cycling, or playing tennis or team sports. · Stay at a healthy weight or lose weight by making the changes in eating and physical activity listed above. Losing just a small amount of weight, even 5 to 10 pounds, can reduce your risk for having a heart attack or stroke. · Do not smoke. When should you call for help? Watch closely for changes in your health, and be sure to contact your doctor if:    · You need help making lifestyle changes.     · You have questions about your medicine. Where can you learn more? Go to http://garfield-tyler.info/. Enter G530 in the search box to learn more about \"High Cholesterol: Care Instructions. \"  Current as of: December 6, 2017  Content Version: 11.8  © 6240-8218 [employer]. Care instructions adapted under license by [employer] (which disclaims liability or warranty for this information). If you have questions about a medical condition or this instruction, always ask your healthcare professional. [address] any warranty or liability for your use of this information. Body Mass Index: Care Instructions  Your Care Instructions    Body mass index (BMI) can help you see if your weight is raising your risk for health problems. It uses a formula to compare how much you weigh with how tall you are. · A BMI lower than 18.5 is considered underweight. · A BMI between 18.5 and 24.9 is considered healthy. · A BMI between 25 and 29.9 is considered overweight. A BMI of 30 or higher is considered obese. If your BMI is in the normal range, it means that you have a lower risk for weight-related health problems.  If your BMI is in the overweight or obese range, you may be at increased risk for weight-related health problems, such as high blood pressure, heart disease, stroke, arthritis or joint pain, and diabetes. If your BMI is in the underweight range, you may be at increased risk for health problems such as fatigue, lower protection (immunity) against illness, muscle loss, bone loss, hair loss, and hormone problems. BMI is just one measure of your risk for weight-related health problems. You may be at higher risk for health problems if you are not active, you eat an unhealthy diet, or you drink too much alcohol or use tobacco products. Follow-up care is a key part of your treatment and safety. Be sure to make and go to all appointments, and call your doctor if you are having problems. It's also a good idea to know your test results and keep a list of the medicines you take. How can you care for yourself at home? · Practice healthy eating habits. This includes eating plenty of fruits, vegetables, whole grains, lean protein, and low-fat dairy. · If your doctor recommends it, get more exercise. Walking is a good choice. Bit by bit, increase the amount you walk every day. Try for at least 30 minutes on most days of the week. · Do not smoke. Smoking can increase your risk for health problems. If you need help quitting, talk to your doctor about stop-smoking programs and medicines. These can increase your chances of quitting for good. · Limit alcohol to 2 drinks a day for men and 1 drink a day for women. Too much alcohol can cause health problems. If you have a BMI higher than 25  · Your doctor may do other tests to check your risk for weight-related health problems. This may include measuring the distance around your waist. A waist measurement of more than 40 inches in men or 35 inches in women can increase the risk of weight-related health problems. · Talk with your doctor about steps you can take to stay healthy or improve your health.  You may need to make lifestyle changes to lose weight and stay healthy, such as changing your diet and getting regular exercise. If you have a BMI lower than 18.5  · Your doctor may do other tests to check your risk for health problems. · Talk with your doctor about steps you can take to stay healthy or improve your health. You may need to make lifestyle changes to gain or maintain weight and stay healthy, such as getting more healthy foods in your diet and doing exercises to build muscle. Where can you learn more? Go to http://garfield-tyler.info/. Enter S176 in the search box to learn more about \"Body Mass Index: Care Instructions. \"  Current as of: October 13, 2016  Content Version: 11.4  © 9404-1786 [employer]. Care instructions adapted under license by [employer] (which disclaims liability or warranty for this information). If you have questions about a medical condition or this instruction, always ask your healthcare professional. [address] any warranty or liability for your use of this information.

## 2018-12-18 ENCOUNTER — OFFICE VISIT (OUTPATIENT)
Dept: INTERNAL MEDICINE CLINIC | Age: 58
End: 2018-12-18

## 2018-12-18 VITALS
DIASTOLIC BLOOD PRESSURE: 82 MMHG | TEMPERATURE: 97.9 F | SYSTOLIC BLOOD PRESSURE: 122 MMHG | OXYGEN SATURATION: 98 % | BODY MASS INDEX: 25.98 KG/M2 | WEIGHT: 196 LBS | HEIGHT: 73 IN | HEART RATE: 79 BPM

## 2018-12-18 DIAGNOSIS — R07.89 ATYPICAL CHEST PAIN: Primary | ICD-10-CM

## 2018-12-18 NOTE — PROGRESS NOTES
Chief Complaint   Patient presents with    Well Male       Depression Screening:  PHQ over the last two weeks 4/20/2018   Little interest or pleasure in doing things Not at all   Feeling down, depressed, irritable, or hopeless Not at all   Total Score PHQ 2 0       Learning Assessment:  Learning Assessment 9/11/2015   PRIMARY LEARNER Patient   HIGHEST LEVEL OF EDUCATION - PRIMARY LEARNER  GRADUATED HIGH SCHOOL OR GED   BARRIERS PRIMARY LEARNER NONE   CO-LEARNER CAREGIVER No   PRIMARY LANGUAGE ENGLISH   LEARNER PREFERENCE PRIMARY OTHER (COMMENT)   ANSWERED BY patient   RELATIONSHIP SELF       Abuse Screening:  Abuse Screening Questionnaire 9/18/2017   Do you ever feel afraid of your partner? N   Are you in a relationship with someone who physically or mentally threatens you? N   Is it safe for you to go home? Y       Fall Risk  Fall Risk Assessment, last 12 mths 9/18/2017   Able to walk? Yes   Fall in past 12 months? No             1. Have you been to the ER, urgent care clinic since your last visit? Hospitalized since your last visit? No    2. Have you seen or consulted any other health care providers outside of the [facility] since your last visit? Include any pap smears or colon screening.  No

## 2018-12-18 NOTE — PATIENT INSTRUCTIONS
Body Mass Index: Care Instructions  Your Care Instructions    Body mass index (BMI) can help you see if your weight is raising your risk for health problems. It uses a formula to compare how much you weigh with how tall you are. · A BMI lower than 18.5 is considered underweight. · A BMI between 18.5 and 24.9 is considered healthy. · A BMI between 25 and 29.9 is considered overweight. A BMI of 30 or higher is considered obese. If your BMI is in the normal range, it means that you have a lower risk for weight-related health problems. If your BMI is in the overweight or obese range, you may be at increased risk for weight-related health problems, such as high blood pressure, heart disease, stroke, arthritis or joint pain, and diabetes. If your BMI is in the underweight range, you may be at increased risk for health problems such as fatigue, lower protection (immunity) against illness, muscle loss, bone loss, hair loss, and hormone problems. BMI is just one measure of your risk for weight-related health problems. You may be at higher risk for health problems if you are not active, you eat an unhealthy diet, or you drink too much alcohol or use tobacco products. Follow-up care is a key part of your treatment and safety. Be sure to make and go to all appointments, and call your doctor if you are having problems. It's also a good idea to know your test results and keep a list of the medicines you take. How can you care for yourself at home? · Practice healthy eating habits. This includes eating plenty of fruits, vegetables, whole grains, lean protein, and low-fat dairy. · If your doctor recommends it, get more exercise. Walking is a good choice. Bit by bit, increase the amount you walk every day. Try for at least 30 minutes on most days of the week. · Do not smoke. Smoking can increase your risk for health problems. If you need help quitting, talk to your doctor about stop-smoking programs and medicines. These can increase your chances of quitting for good. · Limit alcohol to 2 drinks a day for men and 1 drink a day for women. Too much alcohol can cause health problems. If you have a BMI higher than 25  · Your doctor may do other tests to check your risk for weight-related health problems. This may include measuring the distance around your waist. A waist measurement of more than 40 inches in men or 35 inches in women can increase the risk of weight-related health problems. · Talk with your doctor about steps you can take to stay healthy or improve your health. You may need to make lifestyle changes to lose weight and stay healthy, such as changing your diet and getting regular exercise. If you have a BMI lower than 18.5  · Your doctor may do other tests to check your risk for health problems. · Talk with your doctor about steps you can take to stay healthy or improve your health. You may need to make lifestyle changes to gain or maintain weight and stay healthy, such as getting more healthy foods in your diet and doing exercises to build muscle. Where can you learn more? Go to http://garfield-tyler.info/. Enter S176 in the search box to learn more about \"Body Mass Index: Care Instructions. \"  Current as of: October 13, 2016  Content Version: 11.4  © 0635-6977 Healthwise, Incorporated. Care instructions adapted under license by [employer] (which disclaims liability or warranty for this information). If you have questions about a medical condition or this instruction, always ask your healthcare professional. [place] [address] any warranty or liability for your use of this information. Chest Pain: Care Instructions  Your Care Instructions    There are many things that can cause chest pain. Some are not serious and will get better on their own in a few days. But some kinds of chest pain need more testing and treatment.  Your doctor may have recommended a follow-up visit in the next 8 to 12 hours. If you are not getting better, you may need more tests or treatment. Even though your doctor has released you, you still need to watch for any problems. The doctor carefully checked you, but sometimes problems can develop later. If you have new symptoms or if your symptoms do not get better, get medical care right away. If you have worse or different chest pain or pressure that lasts more than 5 minutes or you passed out (lost consciousness), call 911 or seek other emergency help right away. A medical visit is only one step in your treatment. Even if you feel better, you still need to do what your doctor recommends, such as going to all suggested follow-up appointments and taking medicines exactly as directed. This will help you recover and help prevent future problems. How can you care for yourself at home? · Rest until you feel better. · Take your medicine exactly as prescribed. Call your doctor if you think you are having a problem with your medicine. · Do not drive after taking a prescription pain medicine. When should you call for help? Call 911 if:    · You passed out (lost consciousness).     · You have severe difficulty breathing.     · You have symptoms of a heart attack. These may include:  ? Chest pain or pressure, or a strange feeling in your chest.  ? Sweating. ? Shortness of breath. ? Nausea or vomiting. ? Pain, pressure, or a strange feeling in your back, neck, jaw, or upper belly or in one or both shoulders or arms. ? Lightheadedness or sudden weakness. ? A fast or irregular heartbeat. After you call 911, the  may tell you to chew 1 adult-strength or 2 to 4 low-dose aspirin. Wait for an ambulance.  Do not try to drive yourself.    Call your doctor today if:    · You have any trouble breathing.     · Your chest pain gets worse.     · You are dizzy or lightheaded, or you feel like you may faint.     · You are not getting better as expected.     · You are having new or different chest pain. Where can you learn more? Go to http://garfield-tyler.info/. Enter A120 in the search box to learn more about \"Chest Pain: Care Instructions. \"  Current as of: November 20, 2017  Content Version: 11.8  © 1374-8900 [employer]. Care instructions adapted under license by [employer] (which disclaims liability or warranty for this information). If you have questions about a medical condition or this instruction, always ask your healthcare professional. Adilene Lucianoe any warranty or liability for your use of this information.

## 2018-12-18 NOTE — PROGRESS NOTES
HPI:   2 weeks of fleeting (L) anterior CP lasting seconds per episode and associated with stress  No SOB, exertional CP, abd discomfort, cough or fever    ROS is otherwise negative. Past Medical History:   Diagnosis Date    Generalized osteoarthrosis, involving multiple sites     Headache(784.0) April 2011    Low back pain     Lumbar postlaminectomy syndrome     Mixed hyperlipidemia     Neck pain     with headaches    Right arm pain April 2011       Past Surgical History:   Procedure Laterality Date    HX CERVICAL FUSION      C3-C4    HX COLONOSCOPY  12/2012; 1/2018    polyps, diverticula; 2nd colo neg    HX LUMBAR FUSION      L4-L5 with instrumentation    HX TONSIL AND ADENOIDECTOMY         Social History     Socioeconomic History    Marital status:      Spouse name: Not on file    Number of children: Not on file    Years of education: Not on file    Highest education level: Not on file   Social Needs    Financial resource strain: Not on file    Food insecurity - worry: Not on file    Food insecurity - inability: Not on file   [employer] needs - medical: Not on file   [employer] needs - non-medical: Not on file   Occupational History    Not on file   Tobacco Use    Smoking status: Never Smoker    Smokeless tobacco: Former User   Substance and Sexual Activity    Alcohol use: Yes     Alcohol/week: 0.0 oz    Drug use: No    Sexual activity: Not on file   Other Topics Concern    Not on file   Social History Narrative    Not on file       Allergies   Allergen Reactions    Morphine Other (comments)     Hallucinations       Family History   Problem Relation Age of Onset    Cancer Neg Hx     Diabetes Neg Hx     Heart Disease Neg Hx     Hypertension Neg Hx     Stroke Neg Hx        Current Outpatient Medications   Medication Sig Dispense Refill    pravastatin (PRAVACHOL) 40 mg tablet Take 1 Tab by mouth nightly.  90 Tab 3    ASPIRIN/ACETAMINOPHEN/CAFFEINE (Toionie Shaper MIGRAINE PO) Take  by mouth three (3) times daily as needed. Visit Vitals  /82 (BP 1 Location: Left arm, BP Patient Position: Sitting)   Pulse 79   Temp 97.9 °F (36.6 °C) (Tympanic)   Ht 6' 1\" (1.854 m)   Wt 196 lb (88.9 kg)   SpO2 98%   BMI 25.86 kg/m²       PE  Well nourished in NAD  HEENT:  OP: clear. Neck: supple w/o mass or bruits. Chest: clear. CV: RRR w/o m,r,g; pulses intact. Abd: soft, NT, w/o HSM or mass. Ext: w/o edema. Neuro: NF.  ECG: nondiagnostic inferior Q waves unchanged; otherwise WNL  CXR (Pt first) 9/2018 [facility]    Assessment and Plan    Encounter Diagnoses   Name Primary?  Atypical chest pain Yes   Suspect pain stress related; reassured  Exercise stress test arranged to R/O unlikely possibilty of CAD as cause    OV 6 mos or prn  I have explained plan to patient and the patient verbalizes understanding      Discussed the patient's BMI with him. The BMI follow up plan is as follows:     dietary management education, guidance, and counseling  encourage exercise  monitor weight  prescribed dietary intake    An After Visit Summary was printed and given to the patient.

## 2018-12-24 ENCOUNTER — OFFICE VISIT (OUTPATIENT)
Dept: ORTHOPEDIC SURGERY | Age: 58
End: 2018-12-24

## 2018-12-24 VITALS
TEMPERATURE: 97.8 F | WEIGHT: 200.8 LBS | HEART RATE: 64 BPM | RESPIRATION RATE: 16 BRPM | SYSTOLIC BLOOD PRESSURE: 139 MMHG | DIASTOLIC BLOOD PRESSURE: 89 MMHG | OXYGEN SATURATION: 97 % | BODY MASS INDEX: 26.49 KG/M2

## 2018-12-24 DIAGNOSIS — M54.2 CERVICAL PAIN: Primary | ICD-10-CM

## 2018-12-24 RX ORDER — METHYLPREDNISOLONE 4 MG/1
TABLET ORAL
Qty: 1 DOSE PACK | Refills: 0 | Status: SHIPPED | OUTPATIENT
Start: 2018-12-24 | End: 2019-02-11 | Stop reason: ALTCHOICE

## 2018-12-24 RX ORDER — TIAGABINE HYDROCHLORIDE 2 MG/1
2 TABLET, FILM COATED ORAL 2 TIMES DAILY WITH MEALS
Qty: 60 TAB | Refills: 1 | Status: SHIPPED | OUTPATIENT
Start: 2018-12-24 | End: 2019-10-17

## 2018-12-24 NOTE — PROGRESS NOTES
[address], [address]  [place], [address] [place] [address] Street  Phone: [phone]  Fax: [phone]  NEW PATIENT  Patient: Jus Gates                MRN: [id number]       SSN: [id number]  YOB: 1960        AGE: 60 y.o. SEX: male  Body mass index is 26.49 kg/m². PCP: Mary Pereira MD  12/24/18    Chief Complaint   Patient presents with    Neck Pain     New patient    Arm Pain     right     Jus Gates is seen today in consultation for self referral for neck pain    HISTORY OF PRESENT ILLNESS:  Jus Gates is a 60 y.o.  male with history of chronic neck pain for 15 years and radiation of pain, to RUE shoulder and right arm. 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 He has been put on limited duty since. His RUE pain was from his neck to hand before surgery and resolved after his surgery. He had a return of his RUE pain in 2015, it ended up being his rotator cuff, he had surgery on that in 2016. He was last seen by Dr. Raine Pepper in 11/2015 and was noted to f/o PRN. He has been doing well until about two months ago, he has had a return of his RUE pain without injury. The pain is from his neck to shoulder and then elbow and then sometimes radiates into his fingers in the C6-7 distribution. He has tried; PT-not recently,  Non-opioid medications OTC Excedrin, and spinal injections not recently. Pain is aching, numbing and throbbing. Pain is worse with looking up, manual/sedentary work, pushing, pulling and affects sleep, work and recreational activities  and his ability to perform his job. Pain is better with massage and relaxation. He denies any signs of myelopathy, weakness, gait disturbance.     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 He has had 5 back surgeries and had severe RLE pain before his surgeries. He continues with chronic back and RLE that is better but not resolved. It is in the L3 distribution from anterior thigh to knee. He reports that his back pain is at his baseline and he is doing ok with that. He is neurologically intact without weakness. Denies bladder/bowel dysfunction, saddle paresthesia, weakness, gait disturbance, or other neurological deficits. Denies chills, fever,night sweats, unexplained weight loss/weight gain, chest pain, sob or anxiety. Reports no new medical issues or hospitalizations since the last visit. Pt at this time desires to  continue with current care/proceed with medication evaluation/proceed with evaluation of neck pain. Medications: has tried and failed Gabapentin, Topamax, Lyrica, Cymbalta    RADIOGRAPHS  CS X-rays [facility] 12/24/18  Anterior fusion C4-5 w/ good alignment and fixation  Degenerative changes below fusion noted  Final interpretation for Dr. Adriane Bess   Diagnoses and all orders for this visit:    1. Cervical pain  -     AMB POC XRAY, SPINE, CERVICAL; 4+ VIE    Other orders  -     methylPREDNISolone (MEDROL, SALEEM,) 4 mg tablet; Per dose pack instructions  -     tiaGABine (GABITRIL) 2 mg tablet; Take 1 Tab by mouth two (2) times daily (with meals). IMPRESSION AND PLAN:  This is a pt with neck and RUE pain for two months who has not tried recent conservative care. 1) Pt was given information on neck exercises   2) HEP  3) MDP followed by OTC Excedrin  4) Trial of Gabitril  4) Mr. Billy Solis has a reminder for a \"due or due soon\" health maintenance. I have asked that he contact his primary care provider, Dolan Paget, MD, for follow-up on this health maintenance.   5) We have informed patient to notify us for immediate appointment if he has any worsening neurogical symptoms or if an emergency situation presents, then call 911  6)  has been reviewed and is appropriate  7) Pt will follow-up in 6 wks w/ me. Subjective    Work OOW since May, 2018 due to not being able to accommodate limited overhead restrictions and no ladders      Pain Scale: 0 - No pain/10    Pain Assessment  12/24/2018   Location of Pain Neck;Arm;Finger   Location Modifiers Right   Severity of Pain 0   Quality of Pain Throbbing; Other (Comment)   Quality of Pain Comment Numbness fingers on right hand   Duration of Pain A few hours   Duration of Pain Comment -   Frequency of Pain Intermittent   Aggravating Factors Other (Comment)   Aggravating Factors Comment Turning certain positions   Limiting Behavior -   Relieving Factors Other (Comment)   Relieving Factors Comment lying still   Result of Injury Yes   Work-Related Injury Yes         REVIEW OF SYSTEMS  Constitutional: Negative for fever, chills, or weight change. Respiratory: Negative for cough or shortness of breath. Cardiovascular: Negative for chest pain or palpitations. Gastrointestinal: Negative for incontinence, acid reflux, change in bowel habits, or constipation. Genitourinary: Negative for incontinence, dysuria and flank pain. Musculoskeletal: Positive for neck and RUE, back, RLE pain. See HPI. Skin: Negative for rash. Neurological:RUE C6-7, RLE L3  radiculopathy. See HPI. Endo/Heme/Allergies: Negative. Psychiatric/Behavioral: Negative. PHYSICAL EXAMINATION  Visit Vitals  /89   Pulse 64   Temp 97.8 °F (36.6 °C)   Resp 16   Wt 200 lb 12.8 oz (91.1 kg)   SpO2 97%   BMI 26.49 kg/m²         Accompanied by Self. Constitutional:  Well developed, well nourished, in no acute distress. Psychiatric: Affect and mood are appropriate. Integumentary: No rashes or abrasions noted on exposed areas. Cardiovascular/Peripheral Vascular: +2 radial & pedal pulses. No peripheral edema is noted. Lymphatic:  No evidence of lymphedema. No cervical lymphadenopathy.      SPINE/MUSCULOSKELETAL EXAM    Cervical spine:  Neck is midline. Normal muscle tone. No focal atrophy is noted. Neck ROM pain with extension. Shoulder ROM intact. Tenderness to palpation posterior neck. Negative Spurling's sign. Negative Tinel's sign. Negative Vicente's sign. Sensation grossly intact to light touch. Thoracic spine:  Tenderness to palpation: none. Lumbar spine:  No rash, ecchymosis, or gross obliquity. No fasciculations. No focal atrophy is noted. Range of motion is intact with flexion, extension, turning right, turning left. Tenderness to palpation none. No tenderness to palpation at the sciatic notch. SI joints non-tender. Trochanters non tender. Straight leg raise negative    Sensation grossly intact to light touch. MOTOR:     Biceps Triceps Deltoids Wrist Ext Wrist Flex Hand Intrin   Right 5/5 5/5 5/5 5/5 5/5 5/5   Left 5/5 5/5 5/5 5/5 5/5 5/5      Hip Flex Quads Hamstrings Ankle DF EHL Ankle PF   Right 5/5 5/5 5/5 5/5 5/5 5/5   Left 5/5 5/5 5/5 5/5 5/5 5/5       DTRs are 1+ biceps, triceps, brachioradialis, patella, and Achilles. Ambulation without assistive device. FWB.    normal gait and station and normal tandem walk        PAST MEDICAL HISTORY   Past Medical History:   Diagnosis Date    Generalized osteoarthrosis, involving multiple sites     Headache(784.0) April 2011    Low back pain     Lumbar postlaminectomy syndrome     Mixed hyperlipidemia     Neck pain     with headaches    Right arm pain April 2011       Past Surgical History:   Procedure Laterality Date    HX CERVICAL FUSION      C3-C4    HX COLONOSCOPY  12/2012; 1/2018    polyps, diverticula; 2nd colo neg    HX LUMBAR FUSION      L4-L5 with instrumentation    HX SHOULDER ARTHROSCOPY Right 01/2017    HX TONSIL AND ADENOIDECTOMY     .       MEDICATIONS      Current Outpatient Medications   Medication Sig Dispense Refill    methylPREDNISolone (MEDROL, SALEEM,) 4 mg tablet Per dose pack instructions 1 Dose Pack 0    tiaGABine (GABITRIL) 2 mg tablet Take 1 Tab by mouth two (2) times daily (with meals). 60 Tab 1    pravastatin (PRAVACHOL) 40 mg tablet Take 1 Tab by mouth nightly. 90 Tab 3    ASPIRIN/ACETAMINOPHEN/CAFFEINE (EXCEDRIN MIGRAINE PO) Take  by mouth three (3) times daily as needed. ALLERGIES    Allergies   Allergen Reactions    Morphine Other (comments)     Hallucinations          SOCIAL HISTORY    Social History     Socioeconomic History    Marital status:      Spouse name: Not on file    Number of children: Not on file    Years of education: Not on file    Highest education level: Not on file   Social Needs    Financial resource strain: Not on file    Food insecurity - worry: Not on file    Food insecurity - inability: Not on file    Transportation needs - medical: Not on file   [place] needs - non-medical: Not on file   Occupational History    Not on file   Tobacco Use    Smoking status: Never Smoker    Smokeless tobacco: Former User   Substance and Sexual Activity    Alcohol use:  Yes     Alcohol/week: 0.0 oz    Drug use: No    Sexual activity: Not on file   Other Topics Concern     Service Not Asked    Blood Transfusions Not Asked    Caffeine Concern Not Asked    Occupational Exposure Not Asked    Hobby Hazards Not Asked    Sleep Concern Not Asked    Stress Concern Not Asked    Weight Concern Not Asked    Special Diet Not Asked    Back Care Not Asked    Exercise Not Asked    Bike Helmet Not Asked   [address] Not Asked    Self-Exams Not Asked   Social History Narrative    Not on file       FAMILY HISTORY    Family History   Problem Relation Age of Onset    Cancer Sister     Diabetes Neg Hx     Heart Disease Neg Hx     Hypertension Neg Hx     Stroke Neg Hx          Lin [place] NP

## 2018-12-24 NOTE — PATIENT INSTRUCTIONS
Neck: Exercises  Your Care Instructions  Here are some examples of typical rehabilitation exercises for your condition. Start each exercise slowly. Ease off the exercise if you start to have pain. Your doctor or physical therapist will tell you when you can start these exercises and which ones will work best for you. How to do the exercises  Neck stretch    1. This stretch works best if you keep your shoulder down as you lean away from it. To help you remember to do this, start by relaxing your shoulders and lightly holding on to your thighs or your chair. 2. Tilt your head toward your shoulder and hold for 15 to 30 seconds. Let the weight of your head stretch your muscles. 3. If you would like a little added stretch, use your hand to gently and steadily pull your head toward your shoulder. For example, keeping your right shoulder down, lean your head to the left. 4. Repeat 2 to 4 times toward each shoulder. Diagonal neck stretch    1. Turn your head slightly toward the direction you will be stretching, and tilt your head diagonally toward your chest and hold for 15 to 30 seconds. 2. If you would like a little added stretch, use your hand to gently and steadily pull your head forward on the diagonal.  3. Repeat 2 to 4 times toward each side. Dorsal glide stretch    1. Sit or stand tall and look straight ahead. 2. Slowly tuck your chin as you glide your head backward over your body  3. Hold for a count of 6, and then relax for up to 10 seconds. 4. Repeat 8 to 12 times. Chest and shoulder stretch    1. Sit or stand tall and glide your head backward as in the dorsal glide stretch. 2. Raise both arms so that your hands are next to your ears. 3. Take a deep breath, and as you breathe out, lower your elbows down and behind your back. You will feel your shoulder blades slide down and together, and at the same time you will feel a stretch across your chest and the front of your shoulders.   4. Hold for about 6 seconds, and then relax for up to 10 seconds. 5. Repeat 8 to 12 times. Strengthening: Hands on head    1. Move your head backward, forward, and side to side against gentle pressure from your hands, holding each position for about 6 seconds. 2. Repeat 8 to 12 times. Follow-up care is a key part of your treatment and safety. Be sure to make and go to all appointments, and call your doctor if you are having problems. It's also a good idea to know your test results and keep a list of the medicines you take. Where can you learn more? Go to http://garfield-tyler.info/. Enter P975 in the search box to learn more about \"Neck: Exercises. \"  Current as of: November 29, 2017  Content Version: 11.8  © 3472-5592 [employer]wise, Incorporated. Care instructions adapted under license by [employer] (which disclaims liability or warranty for this information). If you have questions about a medical condition or this instruction, always ask your healthcare professional. [address] any warranty or liability for your use of this information.

## 2018-12-31 ENCOUNTER — HOSPITAL ENCOUNTER (OUTPATIENT)
Dept: NON INVASIVE DIAGNOSTICS | Age: 58
Discharge: HOME | End: 2018-12-31
Attending: INTERNAL MEDICINE
Payer: COMMERCIAL

## 2018-12-31 VITALS
BODY MASS INDEX: 26.51 KG/M2 | DIASTOLIC BLOOD PRESSURE: 70 MMHG | HEIGHT: 73 IN | WEIGHT: 200 LBS | SYSTOLIC BLOOD PRESSURE: 116 MMHG

## 2018-12-31 DIAGNOSIS — R07.89 ATYPICAL CHEST PAIN: ICD-10-CM

## 2018-12-31 LAB
STRESS ANGINA INDEX: 0
STRESS BASELINE DIAS BP: 70 MMHG
STRESS BASELINE HR: 84 BPM
STRESS BASELINE SYS BP: 116 MMHG
STRESS ESTIMATED WORKLOAD: 13.4 METS
STRESS EXERCISE DUR MIN: NORMAL
STRESS PEAK DIAS BP: 80 MMHG
STRESS PEAK SYS BP: 182 MMHG
STRESS PERCENT HR ACHIEVED: 121 %
STRESS POST PEAK HR: 166 BPM
STRESS RATE PRESSURE PRODUCT: NORMAL BPM*MMHG
STRESS SR DUKE TREADMILL SCORE: 0
STRESS ST DEPRESSION: 0 MM
STRESS ST ELEVATION: 0 MM
STRESS TARGET HR: 137 BPM

## 2018-12-31 PROCEDURE — 93017 CV STRESS TEST TRACING ONLY: CPT

## 2019-01-08 ENCOUNTER — TELEPHONE (OUTPATIENT)
Dept: ORTHOPEDIC SURGERY | Age: 59
End: 2019-01-08

## 2019-02-11 ENCOUNTER — OFFICE VISIT (OUTPATIENT)
Dept: ORTHOPEDIC SURGERY | Age: 59
End: 2019-02-11

## 2019-02-11 VITALS
TEMPERATURE: 97.7 F | WEIGHT: 196.2 LBS | RESPIRATION RATE: 16 BRPM | SYSTOLIC BLOOD PRESSURE: 133 MMHG | HEIGHT: 73 IN | HEART RATE: 68 BPM | OXYGEN SATURATION: 96 % | DIASTOLIC BLOOD PRESSURE: 88 MMHG | BODY MASS INDEX: 26 KG/M2

## 2019-02-11 DIAGNOSIS — R29.898 RIGHT ARM WEAKNESS: ICD-10-CM

## 2019-02-11 DIAGNOSIS — M54.12 RIGHT CERVICAL RADICULOPATHY: ICD-10-CM

## 2019-02-11 DIAGNOSIS — M54.2 NECK PAIN: Primary | ICD-10-CM

## 2019-02-11 NOTE — PROGRESS NOTES
Chief complaint/History of Present Illness:  Chief Complaint   Patient presents with    Neck Pain    Arm Pain     Right     Follow-up     6 WKS      HPI  Bernita Habermann is a  60 y.o.  male      HISTORY OF PRESENT ILLNESS:  The patient comes in today for followup of his neck pain. He was seen here by STARLA Menard (sp) on 12/24/2018 for neck pain that radiates to his right shoulder and down into his arm. She gave him a Medrol Dosepak. He said that helped by about 2 weeks. She put him on Gabitril 2 mg b.i.d. He tried it for a week, but it made him feel funny so he stopped taking it. It really did not help. States he has increased right arm pain at night. Makes it hard to sleep. He also gets bad headaches. He takes Excedrin Migraine, which does help some. He states the pain is similar to the pain that he had prior to his ACDF, C4-5, in 2003. He gets numbness in the right 3rd, 4th, and 5th digits of his right hand. He is not dropping things, but he does state his balance is off at times. He has been out of work since 05/2018. He works at a [employer]. 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 He denies fever, bowel or bladder dysfunction. Had rotator cuff surgery on the right in 2016. X-rays done last visit were okay. He has failed gabapentin, Topamax, Lyrica, Cymbalta, and amitriptyline in the past.      PHYSICAL EXAMINATION:  Mr. Estrellita Esparza is a 58-year-old male. He is alert and oriented. Normal mood and affect. He has a full weightbearing, nonantalgic gait. He has a mildly poor tandem gait. He has 4/5 strength of his right deltoid and triceps, 5/5 of the rest; 5/5 of the left. Negative Yuliya's. ASSESSMENT/PLAN:  This is a patient who has had a prior fusion, is now having return of neck pain and radiating right arm pain with some weakness in the right. We are going to get an MRI of his neck.   He will follow up with Dr. Jose Terrell. He states he will retry the Gabitril. We will see him back after the MRI. Review of systems:    Past Medical History:   Diagnosis Date    Generalized osteoarthrosis, involving multiple sites     Headache(784.0) April 2011    Low back pain     Lumbar postlaminectomy syndrome     Mixed hyperlipidemia     Neck pain     with headaches    Right arm pain April 2011     Past Surgical History:   Procedure Laterality Date    HX CERVICAL FUSION      C3-C4    HX COLONOSCOPY  12/2012; 1/2018    polyps, diverticula; 2nd colo neg    HX LUMBAR FUSION      L4-L5 with instrumentation    HX SHOULDER ARTHROSCOPY Right 01/2017    HX TONSIL AND ADENOIDECTOMY       Social History     Socioeconomic History    Marital status:      Spouse name: Not on file    Number of children: Not on file    Years of education: Not on file    Highest education level: Not on file   Social Needs    Financial resource strain: Not on file    Food insecurity - worry: Not on file    Food insecurity - inability: Not on file   [employer] needs - medical: Not on file   [employer] needs - non-medical: Not on file   Occupational History    Not on file   Tobacco Use    Smoking status: Never Smoker    Smokeless tobacco: Former User   Substance and Sexual Activity    Alcohol use:  Yes     Alcohol/week: 0.0 oz    Drug use: No    Sexual activity: Not on file   Other Topics Concern     Service Not Asked    Blood Transfusions Not Asked    Caffeine Concern Not Asked    Occupational Exposure Not Asked    Hobby Hazards Not Asked    Sleep Concern Not Asked    Stress Concern Not Asked    Weight Concern Not Asked    Special Diet Not Asked    Back Care Not Asked    Exercise Not Asked    Bike Helmet Not Asked    Seat Belt Not Asked    Self-Exams Not Asked   Social History Narrative    Not on file     Family History   Problem Relation Age of Onset    Cancer Sister     Diabetes Neg Hx     Heart Disease Neg Hx     Hypertension Neg Hx     Stroke Neg Hx        Physical Exam:  Visit Vitals  /88   Pulse 68   Temp 97.7 °F (36.5 °C)   Resp 16   Ht 6' 1\" (1.854 m)   Wt 196 lb 3.2 oz (89 kg)   SpO2 96%   BMI 25.89 kg/m²     Pain Scale: 3/10     has been . reviewed and is appropriate          Diagnoses and all orders for this visit:    1. Neck pain  -     MRI CERV SPINE WO CONT; Future    2. Right cervical radiculopathy  -     MRI CERV SPINE WO CONT; Future    3. Right arm weakness  -     MRI CERV SPINE WO CONT; Future            Follow-up Disposition:  Return in about 4 weeks (around 3/11/2019) for Dr. Dayami Lira.         We have informed Domitila Almanzaruchard to notify us for immediate appointment if he has any worsening neurogical symptoms or if an emergency situation presents, then call 911

## 2019-03-11 ENCOUNTER — OFFICE VISIT (OUTPATIENT)
Dept: ORTHOPEDIC SURGERY | Age: 59
End: 2019-03-11

## 2019-03-11 VITALS
TEMPERATURE: 97.4 F | HEIGHT: 73 IN | OXYGEN SATURATION: 95 % | SYSTOLIC BLOOD PRESSURE: 131 MMHG | BODY MASS INDEX: 26.59 KG/M2 | HEART RATE: 69 BPM | RESPIRATION RATE: 15 BRPM | DIASTOLIC BLOOD PRESSURE: 85 MMHG | WEIGHT: 200.6 LBS

## 2019-03-11 DIAGNOSIS — R20.2 PARESTHESIA OF RIGHT ARM: Primary | ICD-10-CM

## 2019-03-11 DIAGNOSIS — Z98.1 HISTORY OF FUSION OF CERVICAL SPINE: ICD-10-CM

## 2019-03-11 RX ORDER — CYCLOBENZAPRINE HCL 10 MG
10 TABLET ORAL
Qty: 30 TAB | Refills: 1 | Status: SHIPPED | OUTPATIENT
Start: 2019-03-11 | End: 2019-10-17

## 2019-03-11 NOTE — PROGRESS NOTES
[facility] [address]  [address], [address]  [place], [address] [place] [address] Street  Phone: [phone]  Fax: [phone]        Cesar Galvez  : 1960  PCP: Megan Pal MD    PROGRESS NOTE      ASSESSMENT AND PLAN    Diagnoses and all orders for this visit:    1. Paresthesia of right arm  -     EMG ONE EXTREMITY UPPER RT; Future    2. History of fusion of cervical spine  -     EMG ONE EXTREMITY UPPER RT; Future    Other orders  -     cyclobenzaprine (FLEXERIL) 10 mg tablet; Take 1 Tab by mouth nightly as needed for Muscle Spasm(s) (pain). 1. Advised to continue HEP. 2. 61 yo w/hx of ACDF and R shoulder sx with 5 months of ongoing neck pain and recurrent RUE paresthesias. MRI B/L mild  junctional deg changes. EMG RUE - eval cerv rad vs compressive peripheral neuropathy. 3. Trial of Flexeril  4. Given information on EMG    Follow-up Disposition:  Return for after EMG. HISTORY OF PRESENT ILLNESS  Deann Cole is a 60 y.o. male. RHD. Last visit pt was sent to have a cervical spine MRI. Images reviewed with the pt. Reports states no sig. Change from previous. However, his symptoms remain limiting. Last OV restarted on Gabitril. Pt denies benefit with Gabitril. He reports having cervicogenic headaches. He states his pain is similar to before his neck surgery. Pt states his pain in neck and RUE is predominantly at night. Pt denies tingling in his feet. He c/o difficulty sleeping due to pain. Location of pain: neck  Does pain radiate into extremities: RUE pain, numbness in fingers. L fingers tingling/numb. Some R shoulder. Does patient have weakness: no. Denies dropping objects  Pt denies saddle paresthesias. Medications pt is on: Excedrin Migraine some benefit. Pt denies recent ED visits or hospitalizations. Denies persistent fevers, chills, weight changes, neurogenic bowel or bladder symptoms.      Treatments patient has tried:  Physical therapy:Yes  Doing HEP: Unknown  Non-opioid medications: Yes Failed Gabitril, Gabapentin, Topamax, Lyrica, Cymbalta, Amitriptyline. MDP helped temporarily  Spinal injections: Unknown  Spinal surgery- Yes. ACDF C4-5 2003; Reports total of 5 lumbar sx, chronic RLE residuals  Last C MRI 2019: mild stenosis C5-6, C6-7     reviewed. Pt covered by [facility] neck 2003 lifting as . WRI LB 1997, 2003 Pt denies hx of CTS. Pt works at [employer], has been out of work since 5/2018. Pain Assessment  3/11/2019   Location of Pain Neck; Shoulder   Location Modifiers Right;Left   Severity of Pain 3   Quality of Pain Aching; Throbbing; Other (Comment)   Quality of Pain Comment Numbness bilateral fingers mostly right   Duration of Pain Persistent   Duration of Pain Comment -   Frequency of Pain Constant   Aggravating Factors Other (Comment)   Aggravating Factors Comment Lying   Limiting Behavior Some   Relieving Factors Nothing   Relieving Factors Comment -   Result of Injury Yes   Work-Related Injury Yes       MRI cervical spine 3/1/19  IMPRESSION:  1. No significant change when compared to the prior exam. Stable postsurgical changes at C4-5 without residual stenosis. 2. Mild central canal and foraminal stenosis at C5-6 and C6-7.  3. Normal spinal cord signal.        PAST MEDICAL HISTORY   Past Medical History:   Diagnosis Date    Generalized osteoarthrosis, involving multiple sites     Headache(784.0) April 2011    Low back pain     Lumbar postlaminectomy syndrome     Mixed hyperlipidemia     Neck pain     with headaches    Right arm pain April 2011       Past Surgical History:   Procedure Laterality Date    HX CERVICAL FUSION      C3-C4    HX COLONOSCOPY  12/2012; 1/2018    polyps, diverticula; 2nd colo neg    HX LUMBAR FUSION      L4-L5 with instrumentation    HX SHOULDER ARTHROSCOPY Right 01/2017    HX TONSIL AND ADENOIDECTOMY     .       MEDICATIONS    Current Outpatient Medications   Medication Sig Dispense Refill    tiaGABine (GABITRIL) 2 mg tablet Take 1 Tab by mouth two (2) times daily (with meals). 60 Tab 1    pravastatin (PRAVACHOL) 40 mg tablet Take 1 Tab by mouth nightly. 90 Tab 3    ASPIRIN/ACETAMINOPHEN/CAFFEINE (EXCEDRIN MIGRAINE PO) Take  by mouth three (3) times daily as needed. ALLERGIES  Allergies   Allergen Reactions    Morphine Other (comments)     Hallucinations          SOCIAL HISTORY    Social History     Socioeconomic History    Marital status:      Spouse name: Not on file    Number of children: Not on file    Years of education: Not on file    Highest education level: Not on file   Social Needs    Financial resource strain: Not on file    Food insecurity - worry: Not on file    Food insecurity - inability: Not on file    Transportation needs - medical: Not on file   [employer] needs - non-medical: Not on file   Occupational History    Not on file   Tobacco Use    Smoking status: Never Smoker    Smokeless tobacco: Former User   Substance and Sexual Activity    Alcohol use: Yes     Alcohol/week: 0.0 oz    Drug use: No    Sexual activity: Not on file   Other Topics Concern     Service Not Asked    Blood Transfusions Not Asked    Caffeine Concern Not Asked    Occupational Exposure Not Asked    Hobby Hazards Not Asked    Sleep Concern Not Asked    Stress Concern Not Asked    Weight Concern Not Asked    Special Diet Not Asked    Back Care Not Asked    Exercise Not Asked    Bike Helmet Not Asked   [address] Not Asked    Self-Exams Not Asked   Social History Narrative    Not on file       FAMILY HISTORY  Family History   Problem Relation Age of Onset    Cancer Sister     Diabetes Neg Hx     Heart Disease Neg Hx     Hypertension Neg Hx     Stroke Neg Hx        REVIEW OF SYSTEMS  Review of Systems   Constitutional: Negative for chills, fever and weight loss. Respiratory: Negative for shortness of breath.     Cardiovascular: Negative for chest pain. Gastrointestinal: Negative for constipation. Negative for fecal incontinence   Genitourinary: Negative for dysuria. Negative for urinary incontinence   Musculoskeletal:        Per HPI   Skin: Negative for rash. Neurological: Positive for tingling and focal weakness. Negative for dizziness, tremors and headaches. Endo/Heme/Allergies: Does not bruise/bleed easily. Psychiatric/Behavioral: The patient does not have insomnia. PHYSICAL EXAMINATION  Visit Vitals  /85   Pulse 69   Temp 97.4 °F (36.3 °C)   Resp 15   Ht 6' 1\" (1.854 m)   Wt 200 lb 9.6 oz (91 kg)   SpO2 95%   BMI 26.47 kg/m²         Accompanied by self. Constitutional:  Well developed, well nourished, in mod distress due to pain. Psychiatric: Affect and mood are appropriate. Integumentary: No rashes or abrasions noted on exposed areas. Cardiovascular/Peripheral Vascular: Intact l pulses. No peripheral edema is noted BLE. Lymphatic:  No evidence of lymphedema. No cervical lymphadenopathy. SPINE/MUSCULOSKELETAL EXAM    Cervical spine:  Neck is midline. Normal muscle tone. No focal atrophy is noted. Tenderness to palpation none cervical spine. Negative Spurling's sign. Negative Tinel's sign. Negative Vicente's sign. Pain w/ cerv ext, rotation, lat bending. Sensation grossly intact to light touch. MOTOR:      Biceps  Triceps Deltoids Wrist Ext Wrist Flex Hand Intrin   Right +4/5 +4/5 +4/5 +4/5 +4/5 4/5   Left +4/5 +4/5 +4/5 +4/5 +4/5 4/5   Pinch 4/5 bilaterally      Ambulation without assistive device. FWB. Written by Sarah Arellano, as dictated by Юлия Hudson MD.    I, Dr. Юлия Hudson MD, confirm that all documentation is accurate. Mr. Ramez Rosado may have a reminder for a \"due or due soon\" health maintenance. I have asked that he contact his primary care provider for follow-up on this health maintenance.

## 2019-03-11 NOTE — PATIENT INSTRUCTIONS
Electromyogram (EMG) and Nerve Conduction Studies: About These Tests  What are they? An electromyogram (EMG) measures the electrical activity of your muscles when you are not using them (at rest) and when you tighten them (muscle contraction). Nerve conduction studies (NCS) measure how well and how fast the nerves can send electrical signals. EMG and nerve conduction studies are often done together. If they are done together, the nerve conduction studies are done before the EMG. Why are they done? You may need an EMG to find diseases that damage your muscles or nerves or to find why you cannot move your muscles (paralysis), why they feel weak, or why they twitch. You may need nerve conduction studies to find damage to the nerves that lead from the brain and spinal cord to the rest of the body (peripheral nervous system). Nerve conduction studies are often used to help find nerve disorders, such as carpal tunnel syndrome. How can you prepare for these tests? · Tell your doctors ALL the medicines, vitamins, supplements, and herbal remedies you take. Some medicines can affect the test results. You may need to stop taking some medicines before you have this test.     · If you take aspirin or some other blood thinner, be sure to talk to your doctor. He or she will tell you if you should stop taking it before your test. Make sure that you understand exactly what your doctor wants you to do.     · Wear loose-fitting clothing. You may be given a hospital gown to wear.     · The electrodes for the test are attached to your skin. Your skin needs to be clean and free of sprays, oils, creams, and lotions. What happens during the tests? You lie on a table or bed or sit in a reclining chair so your muscles are relaxed. For an EMG:  · Your doctor will insert a needle electrode into a muscle.  This will record the electrical activity while the muscle is at rest. You may feel a quick, sharp pain when the needle electrode is put into a muscle. · Your doctor will ask you to tighten the same muscle slowly and steadily while the electrical activity is recorded. · Your doctor may move the electrode to a different area of the muscle or a different muscle. For nerve conduction studies:  · Your doctor will attach two types of electrodes to your skin. ? One type of electrode is placed over a nerve and will give the nerve an electrical pulse. ? The other type of electrode is placed over the muscle that the nerve controls. It will record how long it takes the muscle to react to the electrical pulse. · You will be able to feel the electrical pulses. They are small shocks and are safe. What else should you know about these tests? · After an EMG, you may be sore and have a tingling feeling in your muscles for up to 2 days. You may have small bruises or swelling at the needle site. · For an EMG, you may be asked to sign a consent form. Talk to your doctor about any concerns you have about the need for the test, its risks, how it will be done, or what the results will mean. How long do they take? · An EMG may take 30 to 60 minutes. · Nerve conduction tests may take from 15 minutes to 1 hour or more. It depends on how many nerves and muscles your doctor tests. What happens after these tests? · If any of the test areas are sore:  ? Put ice or a cold pack on the area for 10 to 20 minutes at a time. Put a thin cloth between the ice and your skin. ? Take an over-the-counter pain medicine, such as acetaminophen (Tylenol), ibuprofen (Advil, Motrin), or naproxen (Aleve). Be safe with medicines. Read and follow all instructions on the label. · You will probably be able to go home right away. · You can go back to your usual activities right away. When should you call for help?   Watch closely for changes in your health, and be sure to contact your doctor if:  · Muscle pain from an EMG test gets worse or you have swelling, tenderness, or pus at any of the needle sites. · You have any problems that you think may be from the test.  · You have any questions about the test or have not received your results. Follow-up care is a key part of your treatment and safety. Be sure to make and go to all appointments, and call your doctor if you are having problems. It's also a good idea to keep a list of the medicines you take. Ask your doctor when you can expect to have your test results. Where can you learn more? Go to http://garfield-tyler.info/. Enter D847 in the search box to learn more about \"Electromyogram (EMG) and Nerve Conduction Studies: About These Tests. \"  Current as of: Pavithra 3, 2018  Content Version: 11.9  © 8984-8124 [employer]. Care instructions adapted under license by [employer] (which disclaims liability or warranty for this information). If you have questions about a medical condition or this instruction, always ask your healthcare professional. [address] any warranty or liability for your use of this information.

## 2019-03-11 NOTE — PROGRESS NOTES
Verbal order entered per Dr. Honey Celaya as documented on blue sheet:EMG for RT cervical radiculopathy, hx of sx. Flexeril 10mg take 1 tab po QHS prn pain.  Disp 30 with 1 refill

## 2019-03-15 ENCOUNTER — DOCUMENTATION ONLY (OUTPATIENT)
Dept: ORTHOPEDIC SURGERY | Age: 59
End: 2019-03-15

## 2019-03-15 NOTE — PROGRESS NOTES
Order and office notes have been faxed to the W/C adjustor, Sourav Latif with [employer], requesting authorization for EMG, these are PENDING approval.

## 2019-03-18 DIAGNOSIS — M54.12 RIGHT CERVICAL RADICULOPATHY: ICD-10-CM

## 2019-03-18 DIAGNOSIS — R29.898 RIGHT ARM WEAKNESS: ICD-10-CM

## 2019-03-18 DIAGNOSIS — M54.2 NECK PAIN: ICD-10-CM

## 2019-06-13 ENCOUNTER — DOCUMENTATION ONLY (OUTPATIENT)
Dept: ORTHOPEDIC SURGERY | Age: 59
End: 2019-06-13

## 2019-06-13 NOTE — PROGRESS NOTES
[id number] and he states he is Mr Zenaida's new adjustor for now. I gave him all of the info for the request for EMG and he stated he would look into it and get back with me. I called patient and gave him the update on case.

## 2019-10-10 DIAGNOSIS — Z00.00 ROUTINE GENERAL MEDICAL EXAMINATION AT A HEALTH CARE FACILITY: ICD-10-CM

## 2019-10-10 DIAGNOSIS — Z12.5 SCREENING FOR PROSTATE CANCER: Primary | ICD-10-CM

## 2019-10-10 NOTE — PROGRESS NOTES
HPI:   Check up  Hx notable for chronic neck/back pain and hyperlipidemia  Chol 168 last year  No acute issues  w/o chest pain/abd. discomfort; no dyspnea, cough or pedal edema; denies constitutional complaints of fever, night sweats or wt loss; no evidence of GI/ hemorrhage    ROS is otherwise negative. Past Medical History:   Diagnosis Date    Generalized osteoarthrosis, involving multiple sites     Headache(784.0) April 2011    Low back pain     Lumbar postlaminectomy syndrome     Mixed hyperlipidemia     Neck pain     with headaches    Right arm pain April 2011       Past Surgical History:   Procedure Laterality Date    HX CERVICAL FUSION      C3-C4    HX COLONOSCOPY  12/2012; 1/2018    polyps, diverticula; 2nd colo neg    HX LUMBAR FUSION      L4-L5 with instrumentation    HX SHOULDER ARTHROSCOPY Right 01/2017    HX TONSIL AND ADENOIDECTOMY         Social History     Socioeconomic History    Marital status:      Spouse name: Not on file    Number of children: Not on file    Years of education: Not on file    Highest education level: Not on file   Occupational History    Not on file   Social Needs    Financial resource strain: Not on file    Food insecurity:     Worry: Not on file     Inability: Not on file    Transportation needs:     Medical: Not on file     Non-medical: Not on file   Tobacco Use    Smoking status: Never Smoker    Smokeless tobacco: Former User   Substance and Sexual Activity    Alcohol use:  Yes     Alcohol/week: 0.0 standard drinks    Drug use: No    Sexual activity: Not on file   Lifestyle    Physical activity:     Days per week: Not on file     Minutes per session: Not on file    Stress: Not on file   Relationships    Social connections:     Talks on phone: Not on file     Gets together: Not on file     Attends [employer] service: Not on file     Active member of club or organization: Not on file     Attends meetings of clubs or organizations: Not on file     Relationship status: Not on file    Intimate partner violence:     Fear of current or ex partner: Not on file     Emotionally abused: Not on file     Physically abused: Not on file     Forced sexual activity: Not on file   Other Topics Concern     Service Not Asked    Blood Transfusions Not Asked    Caffeine Concern Not Asked    Occupational Exposure Not Asked    Hobby Hazards Not Asked    Sleep Concern Not Asked    Stress Concern Not Asked    Weight Concern Not Asked    Special Diet Not Asked    Back Care Not Asked    Exercise Not Asked    Bike Helmet Not Asked   [address] Not Asked    Self-Exams Not Asked   Social History Narrative    Not on file       Allergies   Allergen Reactions    Morphine Other (comments)     Hallucinations       Family History   Problem Relation Age of Onset    Cancer Sister     Diabetes Neg Hx     Heart Disease Neg Hx     Hypertension Neg Hx     Stroke Neg Hx        Current Outpatient Medications   Medication Sig Dispense Refill    pravastatin (PRAVACHOL) 40 mg tablet Take 1 Tab by mouth nightly. 90 Tab 3    ASPIRIN/ACETAMINOPHEN/CAFFEINE (EXCEDRIN MIGRAINE PO) Take  by mouth three (3) times daily as needed. Visit Vitals  /80 (BP 1 Location: Right arm, BP Patient Position: Sitting)   Pulse 78   Temp 97.7 °F (36.5 °C) (Oral)   Resp 18   Ht 6' 1\" (1.854 m)   Wt 202 lb 6.4 oz (91.8 kg)   SpO2 96%   BMI 26.70 kg/m²       PE  Well nourished in NAD  HEENT:  OP: clear. Neck: supple w/o mass or bruits. Chest: clear. CV: RRR w/o m,r,g; pulses intact. Abd: soft, NT, w/o HSM or mass. Rectal: heme neg; prostate 3 FBS and smooth  Ext: w/o edema. Neuro: NF. Assessment and Plan    Encounter Diagnoses   Name Primary?     Routine general medical examination at a health care facility Yes     BP @ goal  Labs to assess metabolic parameters (hyperlipidemia)  [place] 1/2018 (hx of polyps): CXR ordered per pt request; flu vacc given  Continue dietary/exercise efforts  No change in rx  OV 12 mos or prn  I have explained plan to patient and the patient verbalizes understanding

## 2019-10-17 ENCOUNTER — HOSPITAL ENCOUNTER (OUTPATIENT)
Dept: GENERAL RADIOLOGY | Age: 59
Discharge: HOME | End: 2019-10-17
Payer: COMMERCIAL

## 2019-10-17 ENCOUNTER — HOSPITAL ENCOUNTER (OUTPATIENT)
Dept: LAB | Age: 59
Discharge: HOME | End: 2019-10-17
Payer: COMMERCIAL

## 2019-10-17 ENCOUNTER — OFFICE VISIT (OUTPATIENT)
Dept: FAMILY MEDICINE CLINIC | Age: 59
End: 2019-10-17

## 2019-10-17 VITALS
SYSTOLIC BLOOD PRESSURE: 138 MMHG | BODY MASS INDEX: 26.83 KG/M2 | RESPIRATION RATE: 18 BRPM | HEIGHT: 73 IN | WEIGHT: 202.4 LBS | TEMPERATURE: 97.7 F | DIASTOLIC BLOOD PRESSURE: 80 MMHG | OXYGEN SATURATION: 96 % | HEART RATE: 78 BPM

## 2019-10-17 DIAGNOSIS — Z00.00 ROUTINE GENERAL MEDICAL EXAMINATION AT A HEALTH CARE FACILITY: ICD-10-CM

## 2019-10-17 DIAGNOSIS — Z00.00 ROUTINE GENERAL MEDICAL EXAMINATION AT A HEALTH CARE FACILITY: Primary | ICD-10-CM

## 2019-10-17 DIAGNOSIS — Z23 ENCOUNTER FOR IMMUNIZATION: ICD-10-CM

## 2019-10-17 DIAGNOSIS — Z12.5 SCREENING FOR PROSTATE CANCER: ICD-10-CM

## 2019-10-17 LAB
ALBUMIN SERPL-MCNC: 3.9 G/DL (ref 3.4–5)
ALBUMIN/GLOB SERPL: 1.4 {RATIO} (ref 0.8–1.7)
ALP SERPL-CCNC: 86 U/L (ref 45–117)
ALT SERPL-CCNC: 43 U/L (ref 16–61)
ANION GAP SERPL CALC-SCNC: 8 MMOL/L (ref 3–18)
AST SERPL-CCNC: 24 U/L (ref 10–38)
BASOPHILS # BLD: 0 K/UL (ref 0–0.1)
BASOPHILS NFR BLD: 1 % (ref 0–2)
BILIRUB SERPL-MCNC: 0.5 MG/DL (ref 0.2–1)
BUN SERPL-MCNC: 12 MG/DL (ref 7–18)
BUN/CREAT SERPL: 13 (ref 12–20)
CALCIUM SERPL-MCNC: 8.9 MG/DL (ref 8.5–10.1)
CHLORIDE SERPL-SCNC: 107 MMOL/L (ref 100–111)
CO2 SERPL-SCNC: 26 MMOL/L (ref 21–32)
CREAT SERPL-MCNC: 0.92 MG/DL (ref 0.6–1.3)
DIFFERENTIAL METHOD BLD: ABNORMAL
EOSINOPHIL # BLD: 0.1 K/UL (ref 0–0.4)
EOSINOPHIL NFR BLD: 1 % (ref 0–5)
ERYTHROCYTE [DISTWIDTH] IN BLOOD BY AUTOMATED COUNT: 13.1 % (ref 11.6–14.5)
GLOBULIN SER CALC-MCNC: 2.7 G/DL (ref 2–4)
GLUCOSE SERPL-MCNC: 97 MG/DL (ref 74–99)
HCT VFR BLD AUTO: 45.1 % (ref 36–48)
HGB BLD-MCNC: 15.2 G/DL (ref 13–16)
LYMPHOCYTES # BLD: 2.3 K/UL (ref 0.9–3.6)
LYMPHOCYTES NFR BLD: 30 % (ref 21–52)
MCH RBC QN AUTO: 30.8 PG (ref 24–34)
MCHC RBC AUTO-ENTMCNC: 33.7 G/DL (ref 31–37)
MCV RBC AUTO: 91.5 FL (ref 74–97)
MONOCYTES # BLD: 1.2 K/UL (ref 0.05–1.2)
MONOCYTES NFR BLD: 15 % (ref 3–10)
NEUTS SEG # BLD: 4.1 K/UL (ref 1.8–8)
NEUTS SEG NFR BLD: 53 % (ref 40–73)
PLATELET # BLD AUTO: 271 K/UL (ref 135–420)
PMV BLD AUTO: 10 FL (ref 9.2–11.8)
POTASSIUM SERPL-SCNC: 3.8 MMOL/L (ref 3.5–5.5)
PROT SERPL-MCNC: 6.6 G/DL (ref 6.4–8.2)
RBC # BLD AUTO: 4.93 M/UL (ref 4.7–5.5)
SODIUM SERPL-SCNC: 141 MMOL/L (ref 136–145)
WBC # BLD AUTO: 7.7 K/UL (ref 4.6–13.2)

## 2019-10-17 PROCEDURE — 80061 LIPID PANEL: CPT

## 2019-10-17 PROCEDURE — 85025 COMPLETE CBC W/AUTO DIFF WBC: CPT

## 2019-10-17 PROCEDURE — 71046 X-RAY EXAM CHEST 2 VIEWS: CPT

## 2019-10-17 PROCEDURE — 80053 COMPREHEN METABOLIC PANEL: CPT

## 2019-10-17 PROCEDURE — 84153 ASSAY OF PSA TOTAL: CPT

## 2019-10-17 PROCEDURE — 36415 COLL VENOUS BLD VENIPUNCTURE: CPT

## 2019-10-17 RX ORDER — PRAVASTATIN SODIUM 40 MG/1
40 TABLET ORAL
Qty: 90 TAB | Refills: 3 | Status: SHIPPED | OUTPATIENT
Start: 2019-10-17 | End: 2020-10-28 | Stop reason: SDUPTHER

## 2019-10-18 LAB
CHOLEST SERPL-MCNC: 170 MG/DL
HDLC SERPL-MCNC: 42 MG/DL (ref 40–60)
HDLC SERPL: 4 {RATIO} (ref 0–5)
LDLC SERPL CALC-MCNC: 95.8 MG/DL (ref 0–100)
LIPID PROFILE,FLP: ABNORMAL
PSA SERPL-MCNC: 0.6 NG/ML (ref 0–4)
TRIGL SERPL-MCNC: 161 MG/DL (ref ?–150)
VLDLC SERPL CALC-MCNC: 32.2 MG/DL

## 2020-01-06 ENCOUNTER — OFFICE VISIT (OUTPATIENT)
Dept: ORTHOPEDIC SURGERY | Age: 60
End: 2020-01-06

## 2020-01-06 ENCOUNTER — TELEPHONE (OUTPATIENT)
Dept: ORTHOPEDIC SURGERY | Age: 60
End: 2020-01-06

## 2020-01-06 VITALS
TEMPERATURE: 98.4 F | OXYGEN SATURATION: 96 % | WEIGHT: 204 LBS | DIASTOLIC BLOOD PRESSURE: 87 MMHG | HEIGHT: 73 IN | SYSTOLIC BLOOD PRESSURE: 149 MMHG | HEART RATE: 70 BPM | BODY MASS INDEX: 27.04 KG/M2

## 2020-01-06 DIAGNOSIS — M54.2 NECK PAIN: Primary | ICD-10-CM

## 2020-01-06 RX ORDER — CHOLECALCIFEROL (VITAMIN D3) 125 MCG
1 CAPSULE ORAL
COMMUNITY
End: 2021-05-11

## 2020-01-06 NOTE — PROGRESS NOTES
[address], [address]  [place]  Phone: [phone]  Fax: [phone]  PROGRESS NOTE  Patient: Mitch Solorzano                MRN: [id number]       SSN: [id number]  YOB: 1960        AGE: 60 y.o. SEX: male  Body mass index is 26.91 kg/m². PCP: Katia Lamas MD  01/06/20    No chief complaint on file. HISTORY OF PRESENT ILLNESS:  Mitch Solorzano is a 60 y.o.  male with history of chronic neck and back pain. Prior history of neck and back problems: Spinal surgery- Yes. ACDF C4-5 2003; Reports total of 5 lumbar sx-  chronic LLE residuals, Last C MRI 2019: mild stenosis C5-6, C6-7. Pt covered by [facility] neck 2003 lifting as . WRI LB 1997, 2003 Pt denies hx of CTS. Pt works at [employer], has been out of work since 5/2018. He has had a SCS trial in the past w/ no benefit. At last OV he saw Dr. Luz Fink, she ordered a RUE EMG, he never got it done due to [facility] not covering it. Today, he reports that he only has pain in the right shoulder, it doesn't go all the down the arm.  has only been paying for his neck. He has been seeing an orthopedist for his shoulder. Pain is aching and throbbing and R shoulder. Symptoms are worst: evening. Pain is worse with lifting and affects work and recreational activities. Pain is better with relaxation. Denies bladder/bowel dysfunction, saddle paresthesia, weakness, gait disturbance, or other neurological deficits. Current Medications: Failed all anti-neuritics . ASSESSMENT   Diagnoses and all orders for this visit:    1. Neck pain         IMPRESSION AND PLAN:  This is a pt with neck and back pain.  has only been paying for his neck. He wants his low back tx, I offered to Tx that today under his regular insurer. He declined. I have explained to him that we cannot make  pay for his back. I will have one of my billers look into it for him.  As far as his neck, he stable and only needs a yearly f/o w/ MD    > Pt was given information on neck stretches   > HEP  > Mr. Sunita Delgado has a reminder for a \"due or due soon\" health maintenance. I have asked that he contact his primary care provider, Jett Gilmore MD, for follow-up on this health maintenance. >\" We have informed patient to notify us for immediate appointment if he has any worsening neurogical symptoms or if an emergency situation presents, then call 911  >  has been reviewed and is appropriate  > Pt will follow-up in 1 Yr for neck. Subjective    Work WC    Pain Scale: 4/10    Pain Assessment  1/6/2020   Location of Pain Back   Location Modifiers -   Severity of Pain 4   Quality of Pain Sharp; Other (Comment)   Quality of Pain Comment stabbing   Duration of Pain A few minutes   Duration of Pain Comment -   Frequency of Pain Intermittent   Aggravating Factors Standing   Aggravating Factors Comment -   Limiting Behavior Some   Relieving Factors NSAID   Relieving Factors Comment -   Result of Injury -   Work-Related Injury -         REVIEW OF SYSTEMS  Constitutional: Negative for fever, chills, or weight change. Respiratory: Negative for cough or shortness of breath. Cardiovascular: Negative for chest pain or palpitations. Gastrointestinal: Negative for incontinence, acid reflux, change in bowel habits, or constipation. Genitourinary: Negative for incontinence, dysuria and flank pain. Musculoskeletal: Positive for Neck and R shoulder pain. See HPI. Skin: Negative for rash. Neurological:no  radiculopathy. See HPI. Endo/Heme/Allergies: Negative. Psychiatric/Behavioral: Negative. PHYSICAL EXAMINATION  Visit Vitals  /87 (BP 1 Location: Left arm, BP Patient Position: Sitting)   Pulse 70   Temp 98.4 °F (36.9 °C) (Oral)   Ht 6' 1\" (1.854 m)   Wt 204 lb (92.5 kg)   SpO2 96%   BMI 26.91 kg/m²         Accompanied by self. Constitutional:  Well developed, well nourished, in no acute distress. Psychiatric: Affect and mood are appropriate. Integumentary: No rashes or abrasions noted on exposed areas. Cardiovascular/Peripheral Vascular: +2 radial & pedal pulses. No peripheral edema is noted. Lymphatic:  No evidence of lymphedema. No cervical lymphadenopathy. SPINE/MUSCULOSKELETAL EXAM    Cervical spine:  Neck is midline. Normal muscle tone. No focal atrophy is noted. Neck ROM decreased with flexion, extension. Shoulder ROM intact. Tenderness to palpation r trap. Negative Spurling's sign. Negative Tinel's sign. Negative Vicente's sign. Sensation grossly intact to light touch. MOTOR:     Biceps Triceps Deltoids Wrist Ext Wrist Flex Hand Intrin   Right 5/5 5/5 5/5 5/5 5/5 5/5   Left 5/5 5/5 5/5 5/5 5/5 5/5       Ambulation without assistive device. FWB. LLE limp        PAST MEDICAL HISTORY   Past Medical History:   Diagnosis Date    Generalized osteoarthrosis, involving multiple sites     Headache(784.0) April 2011    Low back pain     Lumbar postlaminectomy syndrome     Mixed hyperlipidemia     Neck pain     with headaches    Right arm pain April 2011       Past Surgical History:   Procedure Laterality Date    HX CERVICAL FUSION      C3-C4    HX COLONOSCOPY  12/2012; 1/2018    polyps, diverticula; 2nd colo neg    HX LUMBAR FUSION      L4-L5 with instrumentation    HX SHOULDER ARTHROSCOPY Right 01/2017    HX TONSIL AND ADENOIDECTOMY     . MEDICATIONS      Current Outpatient Medications   Medication Sig Dispense Refill    naproxen sodium (ALEVE) 220 mg cap Take  by mouth.  pravastatin (PRAVACHOL) 40 mg tablet Take 1 Tab by mouth nightly. 90 Tab 3    ASPIRIN/ACETAMINOPHEN/CAFFEINE (EXCEDRIN MIGRAINE PO) Take  by mouth three (3) times daily as needed. ALLERGIES    Allergies   Allergen Reactions    Morphine Other (comments)     Hallucinations    Percocet [Oxycodone-Acetaminophen] Other (comments)     Patients states he doesn't like weaning off of them. SOCIAL HISTORY    Social History     Socioeconomic History    Marital status:      Spouse name: Not on file    Number of children: Not on file    Years of education: Not on file    Highest education level: Not on file   Occupational History    Not on file   Social Needs    Financial resource strain: Not on file    Food insecurity:     Worry: Not on file     Inability: Not on file    Transportation needs:     Medical: Not on file     Non-medical: Not on file   Tobacco Use    Smoking status: Never Smoker    Smokeless tobacco: Former User   Substance and Sexual Activity    Alcohol use:  Yes     Alcohol/week: 0.0 standard drinks    Drug use: No    Sexual activity: Not on file   Lifestyle    Physical activity:     Days per week: Not on file     Minutes per session: Not on file    Stress: Not on file   Relationships    Social connections:     Talks on phone: Not on file     Gets together: Not on file     Attends Quaker service: Not on file     Active member of club or organization: Not on file     Attends meetings of clubs or organizations: Not on file     Relationship status: Not on file    Intimate partner violence:     Fear of current or ex partner: Not on file     Emotionally abused: Not on file     Physically abused: Not on file     Forced sexual activity: Not on file   Other Topics Concern     Service Not Asked    Blood Transfusions Not Asked    Caffeine Concern Not Asked    Occupational Exposure Not Asked   Ltanya Rape Hazards Not Asked    Sleep Concern Not Asked    Stress Concern Not Asked    Weight Concern Not Asked    Special Diet Not Asked    Back Care Not Asked    Exercise Not Asked    Bike Helmet Not Asked   [address] Not Asked    Self-Exams Not Asked   Social History Narrative    Not on file     Socioeconomic History    Marital status:      Spouse name: Not on file    Number of children: Not on file    Years of education: Not on file    Highest education level: Not on file   Occupational History    Not on file   Social Needs    Financial resource strain: Not on file    Food insecurity:     Worry: Not on file     Inability: Not on file    Transportation needs:     Medical: Not on file     Non-medical: Not on file   Tobacco Use    Smoking status: Never Smoker    Smokeless tobacco: Former User   Substance and Sexual Activity    Alcohol use:  Yes     Alcohol/week: 0.0 standard drinks    Drug use: No    Sexual activity: Not on file   Lifestyle    Physical activity:     Days per week: Not on file     Minutes per session: Not on file    Stress: Not on file   Relationships    Social connections:     Talks on phone: Not on file     Gets together: Not on file     Attends Moravian service: Not on file     Active member of club or organization: Not on file     Attends meetings of clubs or organizations: Not on file     Relationship status: Not on file    Intimate partner violence:     Fear of current or ex partner: Not on file     Emotionally abused: Not on file     Physically abused: Not on file     Forced sexual activity: Not on file   Other Topics Concern     Service Not Asked    Blood Transfusions Not Asked    Caffeine Concern Not Asked    Occupational Exposure Not Asked   [place] Hazards Not Asked    Sleep Concern Not Asked    Stress Concern Not Asked    Weight Concern Not Asked    Special Diet Not Asked    Back Care Not Asked    Exercise Not Asked    Bike Helmet Not Asked    Seat Belt Not Asked    Self-Exams Not Asked   Social History Narrative    Not on file      Problem Relation Age of Onset    Cancer Sister     Diabetes Neg Hx     Heart Disease Neg Hx     Hypertension Neg Hx     Stroke Neg Hx          Smoothe STARLA Subramanian

## 2020-01-06 NOTE — PATIENT INSTRUCTIONS
Neck Spasm: Exercises  Introduction  Here are some examples of exercises for you to try. The exercises may be suggested for a condition or for rehabilitation. Start each exercise slowly. Ease off the exercises if you start to have pain. You will be told when to start these exercises and which ones will work best for you. How to do the exercises  Levator scapula stretch    1. Sit in a firm chair, or stand up straight. 2. Gently tilt your head toward your left shoulder. 3. Turn your head to look down into your armpit, bending your head slightly forward. Let the weight of your head stretch your neck muscles. 4. Hold for 15 to 30 seconds. 5. Return to your starting position. 6. Follow the same instructions above, but tilt your head toward your right shoulder. 7. Repeat 2 to 4 times toward each shoulder. Upper trapezius stretch    1. Sit in a firm chair, or stand up straight. 2. This stretch works best if you keep your shoulder down as you lean away from it. To help you remember to do this, start by relaxing your shoulders and lightly holding on to your thighs or your chair. 3. Tilt your head toward your shoulder and hold for 15 to 30 seconds. Let the weight of your head stretch your muscles. 4. If you would like a little added stretch, place your arm behind your back. Use the arm opposite of the direction you are tilting your head. For example, if you are tilting your head to the left, place your right arm behind your back. 5. Repeat 2 to 4 times toward each shoulder. Neck rotation    1. Sit in a firm chair, or stand up straight. 2. Keeping your chin level, turn your head to the right, and hold for 15 to 30 seconds. 3. Turn your head to the left, and hold for 15 to 30 seconds. 4. Repeat 2 to 4 times to each side. Chin tuck    1. Lie on the floor with a rolled-up towel under your neck. Your head should be touching the floor. 2. Slowly bring your chin toward the front of your neck.   3. Hold for a count of 6, and then relax for up to 10 seconds. 4. Repeat 8 to 12 times. Forward neck flexion    1. Sit in a firm chair, or stand up straight. 2. Bend your head forward. 3. Hold for 15 to 30 seconds, then return to your starting position. 4. Repeat 2 to 4 times. Follow-up care is a key part of your treatment and safety. Be sure to make and go to all appointments, and call your doctor if you are having problems. It's also a good idea to know your test results and keep a list of the medicines you take. Where can you learn more? Go to http://garfield-tyler.info/. Enter P962 in the search box to learn more about \"Neck Spasm: Exercises. \"  Current as of: June 26, 2019  Content Version: 12.2  © 1177-9107 [employer]. Care instructions adapted under license by [employer] (which disclaims liability or warranty for this information). If you have questions about a medical condition or this instruction, always ask your healthcare professional. [address] any warranty or liability for your use of this information.

## 2020-01-20 ENCOUNTER — OFFICE VISIT (OUTPATIENT)
Dept: ORTHOPEDIC SURGERY | Age: 60
End: 2020-01-20

## 2020-01-20 VITALS
RESPIRATION RATE: 20 BRPM | HEART RATE: 86 BPM | DIASTOLIC BLOOD PRESSURE: 83 MMHG | OXYGEN SATURATION: 97 % | TEMPERATURE: 98.3 F | HEIGHT: 73 IN | SYSTOLIC BLOOD PRESSURE: 147 MMHG | BODY MASS INDEX: 26.53 KG/M2 | WEIGHT: 200.2 LBS

## 2020-01-20 DIAGNOSIS — M54.50 CHRONIC LEFT-SIDED LOW BACK PAIN WITHOUT SCIATICA: Primary | ICD-10-CM

## 2020-01-20 DIAGNOSIS — M53.3 SI (SACROILIAC) JOINT DYSFUNCTION: ICD-10-CM

## 2020-01-20 DIAGNOSIS — G89.29 CHRONIC LEFT-SIDED LOW BACK PAIN WITHOUT SCIATICA: Primary | ICD-10-CM

## 2020-01-20 RX ORDER — VITAMIN E 1000 UNIT
1000 CAPSULE ORAL DAILY
COMMUNITY

## 2020-01-20 RX ORDER — VITAMIN E 268 MG
400 CAPSULE ORAL DAILY
COMMUNITY

## 2020-01-20 RX ORDER — MELATONIN 5 MG
5 CAPSULE ORAL
COMMUNITY

## 2020-01-20 NOTE — PROGRESS NOTES
Chief complaint/History of Present Illness:  Chief Complaint   Patient presents with   [facility] Follow-up     HPI  Dominick Woodson is a  60 y.o.  male      HISTORY OF PRESENT ILLNESS:  The patient comes in today with a complaint of left-sided, low back pain. He very occasionally gets some left leg pain in the thigh; however, it is more the back. It started about two months ago. He has had chronic back pain from a Workers Compensation injury years ago, but this has gotten worse over the past two months. He has tried Aleve. It helped a little bit. Rest helps. It is a sharp, stabbing pain right there in the left low back. He has tried multiple antineuritics in the past and was unable to tolerate them including Gabitril, Gabapentin, Topamax, Lyrica, Cymbalta, and Amitriptyline. He has had multiple back surgeries due to his Workers Compensation injury. The first was in 1992 by Dr. Paulette Chacko. It was a transverse process arthrodesis L4 to the sacrum. He had a spinal cord stimulator trial in 1996, which was not helpful. In 1997, he had an L3 to S1 fusion by Dr. Saba Saavedra, and then in [address] [place] [address], he had removal of a right S1 sacral screw that was causing him pain. He has also had ACDF in the past.  The only new medical issue is he had some right shoulder arthroscopic surgery by Dr. Lowell English. He denies fever and bowel and bladder dysfunction. He was a  at the [employer]; however, the past couple of years, they took him out of work. They said they could not accommodate his restrictions, which were eight-hour days 40 hours a week. He is a nonsmoker. He denies fever and bowel and bladder dysfunction. PHYSICAL EXAM:  Mr. Wallace Vee is a 48-year-old male. He is alert and oriented. He has a normal mood and affect. He has a full weightbearing, non-antalgic gait using no assistive device. He has 4/5 strength of the bilateral lower extremities and negative straight leg raise.   He is tender to palpation over that left SI joint. He has pain with hyperextension of the lumbar spine. ASSESSMENT/PLAN:  This is a patient who is having some SI joint pain. We put a marker over the area of his pain and did an x-ray that shows it to be near his SI joint. We are going to give him a left SI joint block. Hopefully, this will help. It is very common when you have had these fusions in the back all the way to the sacrum. We will see him back following the block. He is thinking of retiring on disability. Review of systems:    Past Medical History:   Diagnosis Date    Generalized osteoarthrosis, involving multiple sites     Headache(784.0) April 2011    Low back pain     Lumbar postlaminectomy syndrome     Mixed hyperlipidemia     Neck pain     with headaches    Right arm pain April 2011     Past Surgical History:   Procedure Laterality Date    HX CERVICAL FUSION      C3-C4    HX COLONOSCOPY  12/2012; 1/2018    polyps, diverticula; 2nd colo neg    HX LUMBAR FUSION      L4-L5 with instrumentation    HX SHOULDER ARTHROSCOPY Right 01/2017    HX TONSIL AND ADENOIDECTOMY       Social History     Socioeconomic History    Marital status:      Spouse name: Not on file    Number of children: Not on file    Years of education: Not on file    Highest education level: Not on file   Occupational History    Not on file   Social Needs    Financial resource strain: Not on file    Food insecurity:     Worry: Not on file     Inability: Not on file    Transportation needs:     Medical: Not on file     Non-medical: Not on file   Tobacco Use    Smoking status: Never Smoker    Smokeless tobacco: Former User   Substance and Sexual Activity    Alcohol use:  Yes     Alcohol/week: 0.0 standard drinks    Drug use: No    Sexual activity: Not on file   Lifestyle    Physical activity:     Days per week: Not on file     Minutes per session: Not on file    Stress: Not on file   Relationships  Social connections:     Talks on phone: Not on file     Gets together: Not on file     Attends Temple service: Not on file     Active member of club or organization: Not on file     Attends meetings of clubs or organizations: Not on file     Relationship status: Not on file    Intimate partner violence:     Fear of current or ex partner: Not on file     Emotionally abused: Not on file     Physically abused: Not on file     Forced sexual activity: Not on file   Other Topics Concern     Service Not Asked    Blood Transfusions Not Asked    Caffeine Concern Not Asked    Occupational Exposure Not Asked    Hobby Hazards Not Asked    Sleep Concern Not Asked    Stress Concern Not Asked    Weight Concern Not Asked    Special Diet Not Asked    Back Care Not Asked    Exercise Not Asked    Bike Helmet Not Asked   [address] Not Asked    Self-Exams Not Asked   Social History Narrative    Not on file     Family History   Problem Relation Age of Onset    Cancer Sister     Diabetes Neg Hx     Heart Disease Neg Hx     Hypertension Neg Hx     Stroke Neg Hx        Physical Exam:  Visit Vitals  /83   Pulse 86   Temp 98.3 °F (36.8 °C)   Resp 20   Ht 6' 1\" (1.854 m)   Wt 200 lb 3.2 oz (90.8 kg)   SpO2 97%   BMI 26.41 kg/m²     Pain Scale: 8/10     has been . reviewed and is appropriate        Diagnoses and all orders for this visit:    1. Chronic left-sided low back pain without sciatica  -     AMB POC XRAY, SPINE, LUMBOSACRAL; 2 O  -     SCHEDULE SURGERY    2. SI (sacroiliac) joint dysfunction  -     SCHEDULE SURGERY            Follow-up and Dispositions    · Return for after block.              We have informed Sridevi Cordova to notify us for immediate appointment if he has any worsening neurogical symptoms or if an emergency situation presents, then call [phone]

## 2020-03-11 ENCOUNTER — APPOINTMENT (OUTPATIENT)
Dept: GENERAL RADIOLOGY | Age: 60
End: 2020-03-11
Attending: PHYSICAL MEDICINE & REHABILITATION
Payer: OTHER MISCELLANEOUS

## 2020-03-11 ENCOUNTER — HOSPITAL ENCOUNTER (OUTPATIENT)
Age: 60
Setting detail: OUTPATIENT SURGERY
Discharge: HOME | End: 2020-03-11
Attending: PHYSICAL MEDICINE & REHABILITATION | Admitting: PHYSICAL MEDICINE & REHABILITATION
Payer: OTHER MISCELLANEOUS

## 2020-03-11 VITALS
DIASTOLIC BLOOD PRESSURE: 86 MMHG | OXYGEN SATURATION: 95 % | SYSTOLIC BLOOD PRESSURE: 140 MMHG | TEMPERATURE: 98.3 F | RESPIRATION RATE: 16 BRPM | HEART RATE: 85 BPM

## 2020-03-11 PROCEDURE — 74011636320 HC RX REV CODE- 636/320: Performed by: PHYSICAL MEDICINE & REHABILITATION

## 2020-03-11 PROCEDURE — 74011000250 HC RX REV CODE- 250: Performed by: PHYSICAL MEDICINE & REHABILITATION

## 2020-03-11 PROCEDURE — 76010000009 HC PAIN MGT 0 TO 30 MIN PROC: Performed by: PHYSICAL MEDICINE & REHABILITATION

## 2020-03-11 PROCEDURE — 77030039433 HC TY MYLEOGRAM BD -B: Performed by: PHYSICAL MEDICINE & REHABILITATION

## 2020-03-11 PROCEDURE — 74011250637 HC RX REV CODE- 250/637: Performed by: PHYSICAL MEDICINE & REHABILITATION

## 2020-03-11 PROCEDURE — 74011250636 HC RX REV CODE- 250/636: Performed by: PHYSICAL MEDICINE & REHABILITATION

## 2020-03-11 RX ORDER — DEXAMETHASONE SODIUM PHOSPHATE 100 MG/10ML
INJECTION INTRAMUSCULAR; INTRAVENOUS AS NEEDED
Status: DISCONTINUED | OUTPATIENT
Start: 2020-03-11 | End: 2020-03-11 | Stop reason: HOSPADM

## 2020-03-11 RX ORDER — DIAZEPAM 5 MG/1
5-20 TABLET ORAL ONCE
Status: COMPLETED | OUTPATIENT
Start: 2020-03-11 | End: 2020-03-11

## 2020-03-11 RX ORDER — LIDOCAINE HYDROCHLORIDE 10 MG/ML
INJECTION, SOLUTION EPIDURAL; INFILTRATION; INTRACAUDAL; PERINEURAL AS NEEDED
Status: DISCONTINUED | OUTPATIENT
Start: 2020-03-11 | End: 2020-03-11 | Stop reason: HOSPADM

## 2020-03-11 RX ADMIN — DIAZEPAM 10 MG: 5 TABLET ORAL at 14:53

## 2020-03-11 NOTE — H&P
Date of Surgery Update:  Malik Landers was seen and examined. History and physical has been reviewed. The patient has been examined. There have been no significant clinical changes since the last office visit.       Signed By: Tommy Ram MD     March 11, 2020 1:59 PM

## 2020-03-11 NOTE — DISCHARGE INSTRUCTIONS
[facility]   ([facility])  Dr. Randell Eisenberg, Dr. Aimee Morillo, Dr. Jose Hernandez not drive a car, operate heavy machinery or dangerous equipment for 24 hours. * Activity as tolerated; rest for the remainder of the day. * Resume pre-block medications including those for your family doctor. * Do not drink alcoholic beverages for 24 hours. Alcohol and the medications you have received may interact and cause an adverse reaction. * You may feel better this evening and worse tomorrow, as the numbing medications wears off and the steroid has yet to begin to work. After 48 hrs the steroid should begin to release bringing you relief. * You may shower this evening and remove any bandages. * Avoid hot tubs and heating pads for 24 hours. You may use cold packs on the procedure site as tolerated for the first 24 hours. * If a headache develops, drink plenty of fluids and rest.  Take over the counter medications for headache if needed. If the headache continues longer than 24 hours, call MD at the [address] [place] [address]. [phone]    * Continue taking pain medications as needed. * You may resume your regular diet if tolerated. Otherwise, start with sips of water and advance slowly. * If Diabetic: check your blood sugar three times a day for the next 3 days. If your sugar is greater than 300 call your family doctor. If your sugar is greater than 400, have someone transport you to the nearest Emergency Room. * If you experience any of the following problems, Please Call the [address] [place] [address] at [phone].         * Shortness of Breath    * Fever of 101 or higher    * Nausea / Vomiting    * Severe Headache    * Weakness or numbness in arms or legs that is not      resolving    * Prolonged increase in pain greater than 4 days      DISCHARGE SUMMARY from Nurse      PATIENT INSTRUCTIONS:    After oral sedation, for 24 hours or while taking prescription Narcotics:  · Limit your activities  · Do not drive and operate hazardous machinery  · Do not make important personal or business decisions  · Do  not drink alcoholic beverages  · If you have not urinated within 8 hours after discharge, please contact your surgeon on call. Report the following to your surgeon:  · Excessive pain, swelling, redness or odor of or around the surgical area  · Temperature over 101  · Nausea and vomiting lasting longer than 4 hours or if unable to take medications  · Any signs of decreased circulation or nerve impairment to extremity: change in color, persistent  numbness, tingling, coldness or increase pain  · Any questions            What to do at Home:  Recommended activity: Activity as tolerated, NO DRIVING FOR 12 Hours post injection          *  Please give a list of your current medications to your Primary Care Provider. *  Please update this list whenever your medications are discontinued, doses are      changed, or new medications (including over-the-counter products) are added. *  Please carry medication information at all times in case of emergency situations. These are general instructions for a healthy lifestyle:    No smoking/ No tobacco products/ Avoid exposure to second hand smoke    Surgeon General's Warning:  Quitting smoking now greatly reduces serious risk to your health. Obesity, smoking, and sedentary lifestyle greatly increases your risk for illness    A healthy diet, regular physical exercise & weight monitoring are important for maintaining a healthy lifestyle    You may be retaining fluid if you have a history of heart failure or if you experience any of the following symptoms:  Weight gain of 3 pounds or more overnight or 5 pounds in a week, increased swelling in our hands or feet or shortness of breath while lying flat in bed.   Please call your doctor as soon as you notice any of these symptoms; do not wait until your next office visit. Recognize signs and symptoms of STROKE:    F-face looks uneven    A-arms unable to move or move unevenly    S-speech slurred or non-existent    T-time-call 911 as soon as signs and symptoms begin-DO NOT go       Back to bed or wait to see if you get better-TIME IS BRAIN.

## 2020-03-11 NOTE — PROCEDURES
Procedure Note    Patient Name: Tyesha Romo    Date of Procedure: March 11, 2020    Preoperative Diagnosis: Sacroiliac Joint Dysfunction    Post Operative Diagnosis: same    Procedure: SI Joint Injection left     Consent: Informed consent was obtained prior to the procedure. The patient was given the opportunity to ask questions regarding the procedure and its associated risks. In addition to the potential risks associated with the procedure itself, the patient was informed both verbally and in writing of potential side effects of the use of glucocorticoids. The patient appeared to comprehend the informed consent and desired to have the procedure performed. Procedure: The patient was placed in the prone position on the flouroscopy table and the back was prepped and draped in the usual sterile manner. A #22 gauge spinal needle was then advanced to lie within the SI joint after local Lidocaine 1% injection. 1 cc isovue used to confirm the position. A total of 10 mg of preservative free dexamethasone and 5 cc of Lidocaine was introduced into and around the SI joint. The injection area was cleaned and bandaids applied. No excessive bleeding was noted. Patient dressed and was discharged to home with instructions. Discussion: The patient tolerated the procedure well.      Misael Sánchez MD  March 11, 2020 pt to room 20 via wheel chair c/o midsternal chest pain worse with ,movement t  pt scheduled  for cath on friday.

## 2020-03-31 ENCOUNTER — VIRTUAL VISIT (OUTPATIENT)
Dept: ORTHOPEDIC SURGERY | Age: 60
End: 2020-03-31

## 2020-03-31 DIAGNOSIS — M53.3 SI (SACROILIAC) JOINT DYSFUNCTION: Primary | ICD-10-CM

## 2020-03-31 RX ORDER — DICLOFENAC EPOLAMINE 0.01 G/1
1 PATCH TOPICAL EVERY 12 HOURS
Qty: 60 PATCH | Refills: 2 | Status: SHIPPED | OUTPATIENT
Start: 2020-03-31 | End: 2020-08-13

## 2020-03-31 NOTE — PROGRESS NOTES
Heather Wang is a 60 y.o. male evaluated via telephone on 3/31/2020 with his consent. The visit was initially started as a virtual audio-visual visit, but the patient was unable to connect so it was converted to a phone visit at the patient's request.    Consent:  He and/or health care decision maker is aware that that he may receive a bill for this telephone service, depending on his insurance coverage, and has provided verbal consent to proceed: Yes      Documentation:  I communicated with the patient and/or health care decision maker about his left sided back pain due to SI joint dysfunction. Details of this discussion including any medical advice provided: The patient and I discussed the results of his left SI joint injection that was done on March 11, 2020. He states that the injection did help for about a week. But then last Friday he bent over to pick something up and the pain came back very sharply. He stated is starting to ease back off now but it was a burning and stabbing pain. He is taking Aleve which helps a little bit. He has tried multiple antiarrhythmics in the past including Gabitril, gabapentin, Topamax, Lyrica, Cymbalta, and amitriptyline. They either did not work well for him or he had side effects from them. The patient is fused from L3 to the sacrum. He has had a total of 5 lumbar surgeries. Including the spinal cord stimulator in 1996. These are result of a worker comp injury as is his SI joint dysfunction. Only new medical issue is that his right rotator cuff surgery had to be postponed due to the pandemic. Dr. Nia Bacon is his orthopedic surgeon for that. He denies fever bowel bladder dysfunction. He has tried low-dose Lidoderm patches for his SI joint dysfunction in the past.  It did not really help. So we are going to try Flector patch. We discussed the active ingredient in it and the purpose of the anti-inflammatory.   I instructed him to change the patch every 12 hours. We are hoping this will give some relief to his pain there. We will see him back in 3 months to reevaluate his situation. I affirm this is a Patient Initiated Episode with an Established Patient who has not had a related appointment within my department in the past 7 days or scheduled within the next 24 hours.     Total Time: minutes: 11-20 minutes    Note: not billable if this call serves to triage the patient into an appointment for the relevant concern      Ashley Lopez NP

## 2020-03-31 NOTE — PROGRESS NOTES
Jon Contreras presents today for   Chief Complaint   Patient presents with    Back Pain       Is someone accompanying this pt? No, virtual visit    Is the patient using any DME equipment during OV? n/a    Depression Screening:  3 most recent PHQ Screens 1/20/2020   Little interest or pleasure in doing things Not at all   Feeling down, depressed, irritable, or hopeless Not at all   Total Score PHQ 2 0       Learning Assessment:  Learning Assessment 9/11/2015   PRIMARY LEARNER Patient   HIGHEST LEVEL OF EDUCATION - PRIMARY LEARNER  GRADUATED HIGH SCHOOL OR GED   BARRIERS PRIMARY LEARNER NONE   CO-LEARNER CAREGIVER No   PRIMARY LANGUAGE ENGLISH   LEARNER PREFERENCE PRIMARY OTHER (COMMENT)   ANSWERED BY patient   RELATIONSHIP SELF       Abuse Screening:  Abuse Screening Questionnaire 9/18/2017   Do you ever feel afraid of your partner? N   Are you in a relationship with someone who physically or mentally threatens you? N   Is it safe for you to go home? Y       Fall Risk  Fall Risk Assessment, last 12 mths 9/18/2017   Able to walk? Yes   Fall in past 12 months? No       Coordination of Care:  1. Have you been to the ER, urgent care clinic since your last visit? no  Hospitalized since your last visit? no    2. Have you seen or consulted any other health care providers outside of the [address] [place] [address] since your last visit? no Include any pap smears or colon screening.  none    Last  Checked 03/31/2020

## 2020-06-25 ENCOUNTER — OFFICE VISIT (OUTPATIENT)
Dept: ORTHOPEDIC SURGERY | Age: 60
End: 2020-06-25

## 2020-06-25 VITALS
OXYGEN SATURATION: 98 % | RESPIRATION RATE: 16 BRPM | DIASTOLIC BLOOD PRESSURE: 82 MMHG | TEMPERATURE: 98.1 F | WEIGHT: 199 LBS | SYSTOLIC BLOOD PRESSURE: 138 MMHG | HEART RATE: 67 BPM | BODY MASS INDEX: 26.37 KG/M2 | HEIGHT: 73 IN

## 2020-06-25 DIAGNOSIS — M53.3 SI (SACROILIAC) JOINT DYSFUNCTION: Primary | ICD-10-CM

## 2020-06-25 RX ORDER — MELOXICAM 15 MG/1
15 TABLET ORAL DAILY
Qty: 30 TAB | Refills: 2 | Status: SHIPPED | OUTPATIENT
Start: 2020-06-25 | End: 2020-08-13

## 2020-06-25 NOTE — PROGRESS NOTES
Chief complaint   Chief Complaint   Patient presents with    LOW BACK PAIN    Follow-up     3 month W/C        History of Present Illness:  Radha Torres is a  60 y.o.  male who comes in today for follow-up of his low back pain secondary to SI joint dysfunction on the left. He underwent a left SI joint injection March 11, 2020 which only helped for about a week. He did not feel like he got sufficient enough relief to justify having further ones. He has tried using naproxen which is not really helping that much. Last visit we gave him a Flector patch to put over the SI joint and it really is not helping at all. Only new medical issue is that he states 2 months ago he fell on a pump and cracked his rib on the left which is still sore and he is still waiting on a right rotator cuff repair by Dr. Zay Mo. He is waiting for [employer]. approval.  He is status post L3 to the sacrum fusion years ago which encompassed a total of 5 surgeries. He has tried antiarrhythmics including Gabitril, Neurontin, Topamax, Lyrica, Cymbalta and amitriptyline of which he could not tolerate or they were not effective for him. 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 has him out of work as a cannot accommodate those restrictions. He denies fever bowel bladder dysfunction      Physical Exam: The patient is a 79-year-old male well-developed well-nourished who is alert and oriented with a normal mood and affect. He has a full weightbearing nonantalgic gait and he does not use any assistive device. He has 4 5 strength bilateral lower extremities negative, straight leg raise. He is tender to palpation over the left SI joint area. Assessment and Plan: This is a patient with post lumbar laminectomy syndrome and SI joint dysfunction due to worker comp injury years ago. Working try him in some physical therapy for his SI joint.   I am also going to give him Mobic 15 mg daily that he knows to take with food and not to take other anti-inflammatories with it. We will see him back in 3 months sooner if needed. Review of systems:    Past Medical History:   Diagnosis Date    Generalized osteoarthrosis, involving multiple sites     Headache(784.0) April 2011    Low back pain     Lumbar postlaminectomy syndrome     Mixed hyperlipidemia     Neck pain     with headaches    Right arm pain April 2011     Past Surgical History:   Procedure Laterality Date    HX CERVICAL FUSION      C3-C4    HX COLONOSCOPY  12/2012; 1/2018    polyps, diverticula; 2nd colo neg    HX LUMBAR FUSION      L4-L5 with instrumentation    HX SHOULDER ARTHROSCOPY Right 01/2017    HX TONSIL AND ADENOIDECTOMY       Social History     Socioeconomic History    Marital status:      Spouse name: Not on file    Number of children: Not on file    Years of education: Not on file    Highest education level: Not on file   Occupational History    Not on file   Social Needs    Financial resource strain: Not on file    Food insecurity     Worry: Not on file     Inability: Not on file    Transportation needs     Medical: Not on file     Non-medical: Not on file   Tobacco Use    Smoking status: Never Smoker    Smokeless tobacco: Former User   Substance and Sexual Activity    Alcohol use:  Yes     Alcohol/week: 0.0 standard drinks    Drug use: No    Sexual activity: Not on file   Lifestyle    Physical activity     Days per week: Not on file     Minutes per session: Not on file    Stress: Not on file   Relationships    Social connections     Talks on phone: Not on file     Gets together: Not on file     Attends Oriental orthodox service: Not on file     Active member of club or organization: Not on file     Attends meetings of clubs or organizations: Not on file     Relationship status: Not on file    Intimate partner violence     Fear of current or ex partner: Not on file Emotionally abused: Not on file     Physically abused: Not on file     Forced sexual activity: Not on file   Other Topics Concern     Service Not Asked    Blood Transfusions Not Asked    Caffeine Concern Not Asked    Occupational Exposure Not Asked    Hobby Hazards Not Asked    Sleep Concern Not Asked    Stress Concern Not Asked    Weight Concern Not Asked    Special Diet Not Asked    Back Care Not Asked    Exercise Not Asked    Bike Helmet Not Asked   [address] Not Asked    Self-Exams Not Asked   Social History Narrative    Not on file     Family History   Problem Relation Age of Onset    Cancer Sister     Diabetes Neg Hx     Heart Disease Neg Hx     Hypertension Neg Hx     Stroke Neg Hx        Physical Exam:  Visit Vitals  /82   Pulse 67   Temp 98.1 °F (36.7 °C) (Oral)   Resp 16   Ht 6' 1\" (1.854 m)   Wt 199 lb (90.3 kg)   SpO2 98%   BMI 26.25 kg/m²     Pain Scale: 7/10       has been . reviewed and is appropriate          Diagnoses and all orders for this visit:    1. SI (sacroiliac) joint dysfunction  -     REFERRAL TO PHYSICAL THERAPY  -     meloxicam (Mobic) 15 mg tablet; Take 1 Tab by mouth daily. Follow-up and Dispositions    · Return in about 3 months (around 9/25/2020) for with NP.              We have informed Rose Iniguez to notify us for immediate appointment if he has any worsening neurogical symptoms or if an emergency situation presents, then call [phone]

## 2020-07-17 ENCOUNTER — OFFICE VISIT (OUTPATIENT)
Dept: ORTHOPEDIC SURGERY | Age: 60
End: 2020-07-17

## 2020-07-17 VITALS
WEIGHT: 194 LBS | BODY MASS INDEX: 25.6 KG/M2 | SYSTOLIC BLOOD PRESSURE: 117 MMHG | RESPIRATION RATE: 17 BRPM | DIASTOLIC BLOOD PRESSURE: 92 MMHG | HEART RATE: 92 BPM | TEMPERATURE: 97.9 F

## 2020-07-17 DIAGNOSIS — M54.2 NECK PAIN: Primary | ICD-10-CM

## 2020-07-17 DIAGNOSIS — R20.0 NUMBNESS AND TINGLING IN RIGHT HAND: ICD-10-CM

## 2020-07-17 DIAGNOSIS — R51.9 INCREASED FREQUENCY OF HEADACHES: ICD-10-CM

## 2020-07-17 DIAGNOSIS — R20.2 NUMBNESS AND TINGLING IN RIGHT HAND: ICD-10-CM

## 2020-07-17 NOTE — PROGRESS NOTES
Chief complaint   Chief Complaint   Patient presents with    Neck Pain     f/u       History of Present Illness:  Jessica Carey is a  60 y.o.  male comes in today due to his neck pain. He is status post ACDF C4-5 back in 2003 from a Worker's Comp. injury. 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 He states he is having increasing neck pain with pain that radiates down to his fingers and his fingers are numb. The thing that is bothering him a lot is the increase in frequency of headaches which he feels like they are coming from his neck. He denies fever bowel bladder dysfunction. He does have a known rotator cuff tear and is to have it repaired by Dr. Alfonzo Martinez which is also worker comp injury related. Worker's Comp. has approved that and he will be having that done in the next couple of months. He wanted to get his neck checked out before he does that. He is to work at the [employer] but they cannot accommodate his restrictions so he has been out of work for quite some time and [employer]. his pain him. He is a non-smoker. He denies fever bowel bladder dysfunction. Physical Exam: The patient is a 69-year-old male well-developed well-nourished who is alert and oriented with a normal mood and affect. He has a full weightbearing nonantalgic gait. He does not use any assistive device. He has 5 of 5 strength of his bilateral upper extremities except for the right tricep is 4 out of 5. Negative Hoffmans. He has 4 5 strength bilateral lower extremities. Assessment and Plan: This is a patient who had a Worker's Comp. injury resulting in a anterior cervical decompression fusion C4-5 back in 2003. He may be having junctional issues that are giving him neck pain with numbness in his right fingers and increased frequency and severity headaches. We will get a get a repeat MRI of the cervical spine.   He will follow-up with Dr. Jhon Gustafson or Heber Carlos for MRI follow-up. Review of systems:    Past Medical History:   Diagnosis Date    Generalized osteoarthrosis, involving multiple sites     Headache(784.0) April 2011    Low back pain     Lumbar postlaminectomy syndrome     Mixed hyperlipidemia     Neck pain     with headaches    Right arm pain April 2011     Past Surgical History:   Procedure Laterality Date    HX CERVICAL FUSION      C3-C4    HX COLONOSCOPY  12/2012; 1/2018    polyps, diverticula; 2nd colo neg    HX LUMBAR FUSION      L4-L5 with instrumentation    HX SHOULDER ARTHROSCOPY Right 01/2017    HX TONSIL AND ADENOIDECTOMY       Social History     Socioeconomic History    Marital status:      Spouse name: Not on file    Number of children: Not on file    Years of education: Not on file    Highest education level: Not on file   Occupational History    Not on file   Social Needs    Financial resource strain: Not on file    Food insecurity     Worry: Not on file     Inability: Not on file    Transportation needs     Medical: Not on file     Non-medical: Not on file   Tobacco Use    Smoking status: Never Smoker    Smokeless tobacco: Former User   Substance and Sexual Activity    Alcohol use:  Yes     Alcohol/week: 0.0 standard drinks    Drug use: No    Sexual activity: Not on file   Lifestyle    Physical activity     Days per week: Not on file     Minutes per session: Not on file    Stress: Not on file   Relationships    Social connections     Talks on phone: Not on file     Gets together: Not on file     Attends Church service: Not on file     Active member of club or organization: Not on file     Attends meetings of clubs or organizations: Not on file     Relationship status: Not on file    Intimate partner violence     Fear of current or ex partner: Not on file     Emotionally abused: Not on file     Physically abused: Not on file     Forced sexual activity: Not on file   Other Topics Concern     Service Not Asked    Blood Transfusions Not Asked    Caffeine Concern Not Asked    Occupational Exposure Not Asked    Hobby Hazards Not Asked    Sleep Concern Not Asked    Stress Concern Not Asked    Weight Concern Not Asked    Special Diet Not Asked    Back Care Not Asked    Exercise Not Asked    Bike Helmet Not Asked    Seat Belt Not Asked    Self-Exams Not Asked   Social History Narrative    Not on file     Family History   Problem Relation Age of Onset    Cancer Sister     Diabetes Neg Hx     Heart Disease Neg Hx     Hypertension Neg Hx     Stroke Neg Hx        Physical Exam:  Visit Vitals  BP (!) 117/92 (BP 1 Location: Left arm, BP Patient Position: Sitting)   Pulse 92   Temp 97.9 °F (36.6 °C) (Oral)   Resp 17   Wt 194 lb (88 kg)   BMI 25.60 kg/m²     Pain Scale: 4/10       has been . reviewed and is appropriate          Diagnoses and all orders for this visit:    1. Neck pain  -     MRI CERV SPINE WO CONT; Future    2. Numbness and tingling in right hand  -     MRI CERV SPINE WO CONT; Future    3. Increased frequency of headaches  -     MRI CERV SPINE WO CONT; Future            Follow-up and Dispositions    · Return in about 4 weeks (around 8/14/2020) for with Dr. Tam Tejada or Elizabeth Pryor for MRI f/u.              We have informed Keily Ventura to notify us for immediate appointment if he has any worsening neurogical symptoms or if an emergency situation presents, then call [phone]

## 2020-07-30 ENCOUNTER — HOSPITAL ENCOUNTER (OUTPATIENT)
Age: 60
Discharge: HOME | End: 2020-07-30
Attending: NURSE PRACTITIONER
Payer: OTHER MISCELLANEOUS

## 2020-07-30 DIAGNOSIS — R20.2 NUMBNESS AND TINGLING IN RIGHT HAND: ICD-10-CM

## 2020-07-30 DIAGNOSIS — M54.2 NECK PAIN: ICD-10-CM

## 2020-07-30 DIAGNOSIS — R51.9 INCREASED FREQUENCY OF HEADACHES: ICD-10-CM

## 2020-07-30 DIAGNOSIS — R20.0 NUMBNESS AND TINGLING IN RIGHT HAND: ICD-10-CM

## 2020-07-30 PROCEDURE — 72141 MRI NECK SPINE W/O DYE: CPT

## 2020-08-13 ENCOUNTER — OFFICE VISIT (OUTPATIENT)
Dept: ORTHOPEDIC SURGERY | Age: 60
End: 2020-08-13

## 2020-08-13 VITALS
WEIGHT: 195 LBS | DIASTOLIC BLOOD PRESSURE: 84 MMHG | HEIGHT: 73 IN | SYSTOLIC BLOOD PRESSURE: 143 MMHG | BODY MASS INDEX: 25.84 KG/M2 | TEMPERATURE: 97.5 F | OXYGEN SATURATION: 97 % | HEART RATE: 78 BPM

## 2020-08-13 DIAGNOSIS — G56.01 CARPAL TUNNEL SYNDROME OF RIGHT WRIST: ICD-10-CM

## 2020-08-13 DIAGNOSIS — M47.812 CERVICAL SPONDYLOSIS: ICD-10-CM

## 2020-08-13 DIAGNOSIS — G44.86 CERVICOGENIC HEADACHE: Primary | ICD-10-CM

## 2020-08-13 DIAGNOSIS — Z98.1 HX OF FUSION OF CERVICAL SPINE: ICD-10-CM

## 2020-08-13 RX ORDER — TIZANIDINE 2 MG/1
TABLET ORAL
Qty: 60 TAB | Refills: 1 | Status: SHIPPED | OUTPATIENT
Start: 2020-08-13 | End: 2020-10-30 | Stop reason: SDUPTHER

## 2020-08-13 RX ORDER — ACETAMINOPHEN, ASPRIN, CAFFEINE 250; 250; 65 MG/1; MG/1; MG/1
1 TABLET, COATED ORAL
COMMUNITY

## 2020-08-13 NOTE — PATIENT INSTRUCTIONS
Carpal Tunnel Syndrome: Care Instructions  Overview     Carpal tunnel syndrome is numbness, tingling, weakness, and pain in your hand, wrist, and sometimes forearm. It is caused by pressure on the median nerve. This nerve and several tough tissues called tendons run through a space in the wrist. This space is called the carpal tunnel. The repeated hand motions used in work and some hobbies and sports can put pressure on the median nerve. Pregnancy can cause carpal tunnel syndrome. Several conditions, such as diabetes, arthritis, and an underactive thyroid, can also cause it. You may be able to limit an activity or change the way you do it to reduce your symptoms. You also can take other steps to feel better. If your symptoms are mild, 1 to 2 weeks of home treatment are likely to ease your pain. Surgery is needed only if other treatments do not work. Follow-up care is a key part of your treatment and safety. Be sure to make and go to all appointments, and call your doctor if you are having problems. It's also a good idea to know your test results and keep a list of the medicines you take. How can you care for yourself at home? · If possible, stop or reduce the activity that causes your symptoms. If you cannot stop the activity, take frequent breaks to rest and stretch or change hand positions to do a task. Try switching hands, such as when using a computer mouse. · Try to avoid bending or twisting your wrists. · Ask your doctor if you can take an over-the-counter pain medicine, such as acetaminophen (Tylenol), ibuprofen (Advil, Motrin), or naproxen (Aleve). Be safe with medicines. Read and follow all instructions on the label. · If your doctor prescribes corticosteroid medicine to help reduce pain and swelling, take it exactly as prescribed. Call your doctor if you think you are having a problem with your medicine. · Put ice or a cold pack on your wrist for 10 to 20 minutes at a time to ease pain.  Put a thin cloth between the ice and your skin. · If your doctor or your physical or occupational therapist tells you to wear a wrist splint, wear it as directed to keep your wrist in a neutral position. This also eases pressure on your median nerve. · Ask your doctor whether you should have physical or occupational therapy to learn how to do tasks differently. · Try a yoga class to stretch your muscles and build strength in your hands and wrists. Yoga has been shown to ease carpal tunnel symptoms. To prevent carpal tunnel  · When working at a computer, keep your hands and wrists in line with your forearms. Hold your elbows close to your sides. Take a break every 10 to 15 minutes. · Try these exercises:  ? Warm up: Rotate your wrist up, down, and from side to side. Repeat this 4 times. Stretch your fingers far apart, relax them, then stretch them again. Repeat 4 times. Stretch your thumb by pulling it back gently, holding it, and then releasing it. Repeat 4 times. ? Prayer stretch: Start with your palms together in front of your chest just below your chin. Slowly lower your hands toward your waistline while keeping your hands close to your stomach and your palms together until you feel a mild to moderate stretch under your forearms. Hold for 10 to 20 seconds. Repeat 4 times. ? Wrist flexor stretch: Hold your arm in front of you with your palm up. Bend your wrist, pointing your hand toward the floor. With your other hand, gently bend your wrist further until you feel a mild to moderate stretch in your forearm. Hold for 10 to 20 seconds. Repeat 4 times. ? Wrist extensor stretch: Repeat the steps for the wrist flexor stretch, but begin with your extended hand palm down. · Squeeze a rubber exercise ball several times a day to keep your hands and fingers strong. · Avoid holding objects (such as a book) in one position for a long time. When possible, use your whole hand to grasp an object.  Using just the thumb and index finger can put stress on the wrist.  · Do not smoke. It can make this condition worse by reducing blood flow to the median nerve. If you need help quitting, talk to your doctor about stop-smoking programs and medicines. These can increase your chances of quitting for good. When should you call for help? Watch closely for changes in your health, and be sure to contact your doctor if:  · Your pain or other problems do not get better with home care. · You want more information about physical or occupational therapy. · You have side effects of your corticosteroid medicine, such as:  ? Weight gain. ? Mood changes. ? Trouble sleeping. ? Bruising easily. · You have any other problems with your medicine. Where can you learn more? Go to http://garfield-tyler.info/  Enter R432 in the search box to learn more about \"Carpal Tunnel Syndrome: Care Instructions. \"  Current as of: March 2, 2020               Content Version: 12.5  © 3683-8976 [employer]. Care instructions adapted under license by [employer] (which disclaims liability or warranty for this information). If you have questions about a medical condition or this instruction, always ask your healthcare professional. [place] [address] any warranty or liability for your use of this information.

## 2020-08-13 NOTE — PROGRESS NOTES
Markrylanmarilynn Henriquezpradip [facility] [address].  [address], [address]  [place], [address] [place] [address] Street  Phone: [phone]  Fax: [phone]        August Macario  : 1960  PCP: Gabriela Bill MD    PROGRESS NOTE      ASSESSMENT AND PLAN    Diagnoses and all orders for this visit:    1. Cervicogenic headache  -     tiZANidine (ZANAFLEX) 2 mg tablet; 1-2 tabs po QHS prn pain    2. Cervical spondylosis  -     tiZANidine (ZANAFLEX) 2 mg tablet; 1-2 tabs po QHS prn pain    3. Hx of fusion of cervical spine  -     tiZANidine (ZANAFLEX) 2 mg tablet; 1-2 tabs po QHS prn pain    4. Carpal tunnel syndrome of right wrist  -     tiZANidine (ZANAFLEX) 2 mg tablet; 1-2 tabs po QHS prn pain      1. 60 y.o. male with neck pain and right upper extremity symptoms. His MRIs do not demonstrate any focal impingement. I feel his symptoms are myofascial in the neck, related to shoulder pain, and CTS. 2. Advised to continue HEP  3. Trial of Zanaflex 2 mg 1-2 tab QHS  4. Instructed nightly wrist brace use  5. Given information on CTS    Follow-up and Dispositions    · Return for 3-6 months pt may call. HISTORY OF PRESENT ILLNESS  Lauryn Benitez is a 60 y.o. male. Pt was last evaluated by me 3/2019 for RUE paresthesia. Last evaluated by STARLA [facility] [place] 2020 for neck pain. Pt had a C spine MRI. Images reviewed with the pt. Pt states that his neck feels like its out of line, and he can hear clicks. Affirms headaches in his posterior head, citing that they're worse at night. He notes that it sometimes hurts to breathe. Pt describes numbness in his first 3 fingers on his R hand, and he states that he often shakes out his hands in the morning. Denies hx of CTS. Admits that he will be getting R shoulder surgery.     Pain Assessment  2020   Location of Pain Neck   Location Modifiers Right   Severity of Pain 4   Quality of Pain Dull   Quality of Pain Comment numbness   Duration of Pain -   Duration of Pain Comment - Frequency of Pain Constant   Aggravating Factors -   Aggravating Factors Comment -   Limiting Behavior -   Relieving Factors -   Relieving Factors Comment -   Result of Injury -   Work-Related Injury -       Does pain radiate into extremities: RUE pain   Numbness/tingling: numbness in fingers R>L  Does patient have weakness: No  Pt denies saddle paresthesias. Medications pt is on: Excedrin for migraines. Denies persistent fevers, chills, weight changes, neurogenic bowel or bladder symptoms.      Treatments patient has tried:  Physical therapy:Yes  Doing HEP: Unknown  Non-opioid medications: Yes Failed Gabitril, Gabapentin, Topamax, Lyrica, Cymbalta, Amitriptyline. MDP helped temporarily, Flector patch no benefit  Spinal injections: Unknown    Spinal surgery- Yes. ACDF C4-5 2003; Reports total of 5 lumbar sx, chronic RLE residuals    Last C MRI 2019: mild stenosis C5-6, C6-7  Last C MRI 7/2020: Intact fusion at C4-5, deg changes C6-7      reviewed. RHD. Pt covered by [facility] neck 2003 lifting as . WRI LB 1997, 2003 Pt denies hx of CTS. Pt works at [employer], has been out of work since 5/2018. MRI Results (most recent):  Results from [place] encounter on 07/30/20   MRI CERV SPINE WO CONT    Narrative MR CERVICAL SPINE WITHOUT CONTRAST    HISTORY:    COMPARISON: None    TECHNIQUE: Multisequence multiplanar imaging through the cervical spine. FINDINGS:    Alignment: Intact lordosis  Vertebral body height: Normal  Marrow signal: Unremarkable  Disc spaces: Preserved height and signal intensity    Cervicomedullary Junction: Patent  Cervical cord: No cord expansion or atrophy. On axial imaging, findings at each level are as follows:  C1-2: Alignment of the occipital condyles and C1 normal  Atlantodental interspace is intact      C2/C3: Patent canal and foramina. C3/C4: Patent canal and foramina.     C4/C5: Anterior fusion is thoroughly incorporated at this level with post tear  elements intact. Kick plate and screws in good position at this level    C5/C6: Patent canal and foramina. C6/C7: Patent moderate degenerative changes with some anterior osteophyte C6-7. Not associated with extradural defect or neural compression    C7/T1: Patent canal and foramina. Other structures: Unremarkable      Impression IMPRESSION:    There has been a anterior C4-5 fusion which is well-healed  No significant central canal stenosis or neuroforaminal stenosis  No cord or nerve root impingement  Moderate degenerative narrowing and osteophytic changes C6-7    Thank you for enabling us to participate in the care of this patient. PAST MEDICAL HISTORY   Past Medical History:   Diagnosis Date    Generalized osteoarthrosis, involving multiple sites     Headache(784.0) April 2011    Low back pain     Lumbar postlaminectomy syndrome     Mixed hyperlipidemia     Neck pain     with headaches    Right arm pain April 2011       Past Surgical History:   Procedure Laterality Date    HX CERVICAL FUSION      C3-C4    HX COLONOSCOPY  12/2012; 1/2018    polyps, diverticula; 2nd colo neg    HX LUMBAR FUSION      L4-L5 with instrumentation    HX SHOULDER ARTHROSCOPY Right 01/2017    HX TONSIL AND ADENOIDECTOMY     . MEDICATIONS      Current Outpatient Medications   Medication Sig Dispense Refill    tiZANidine (ZANAFLEX) 2 mg tablet 1-2 tabs po QHS prn pain 60 Tab 1    ascorbic acid, vitamin C, (VITAMIN C) 1,000 mg tablet Take 1,000 mg by mouth daily.  vitamin E (AQUA GEMS) 400 unit capsule Take 400 Units by mouth daily.  melatonin 5 mg cap capsule Take 5 mg by mouth nightly.  pravastatin (PRAVACHOL) 40 mg tablet Take 1 Tab by mouth nightly. 90 Tab 3    ASPIRIN/ACETAMINOPHEN/CAFFEINE (EXCEDRIN MIGRAINE PO) Take  by mouth three (3) times daily as needed.  aspirin-acetaminophen-caffeine (Excedrin Migraine) 250-250-65 mg per tablet Take 1 Tab by mouth.       naproxen sodium (ALEVE) 220 mg cap Take 1 Tab by mouth two (2) times daily as needed. Controlled Substance Monitoring:    No flowsheet data found. ALLERGIES    Allergies   Allergen Reactions    Morphine Other (comments)     Hallucinations    Percocet [Oxycodone-Acetaminophen] Other (comments)     Patients states he doesn't like weaning off of them. SOCIAL HISTORY    Social History     Socioeconomic History    Marital status:      Spouse name: Not on file    Number of children: Not on file    Years of education: Not on file    Highest education level: Not on file   Occupational History    Not on file   Social Needs    Financial resource strain: Not on file    Food insecurity     Worry: Not on file     Inability: Not on file    Transportation needs     Medical: Not on file     Non-medical: Not on file   Tobacco Use    Smoking status: Never Smoker    Smokeless tobacco: Former User   Substance and Sexual Activity    Alcohol use:  Yes     Alcohol/week: 0.0 standard drinks    Drug use: No    Sexual activity: Not on file   Lifestyle    Physical activity     Days per week: Not on file     Minutes per session: Not on file    Stress: Not on file   Relationships    Social connections     Talks on phone: Not on file     Gets together: Not on file     Attends Baptism service: Not on file     Active member of club or organization: Not on file     Attends meetings of clubs or organizations: Not on file     Relationship status: Not on file    Intimate partner violence     Fear of current or ex partner: Not on file     Emotionally abused: Not on file     Physically abused: Not on file     Forced sexual activity: Not on file   Other Topics Concern     Service Not Asked    Blood Transfusions Not Asked    Caffeine Concern Not Asked    Occupational Exposure Not Asked   Nurys Carline Hazards Not Asked    Sleep Concern Not Asked    Stress Concern Not Asked    Weight Concern Not Asked    Special Diet Not Asked    Back Care Not Asked    Exercise Not Asked    Bike Helmet Not Asked    Seat Belt Not Asked    Self-Exams Not Asked   Social History Narrative    Not on file       FAMILY HISTORY    Family History   Problem Relation Age of Onset    Cancer Sister     Diabetes Neg Hx     Heart Disease Neg Hx     Hypertension Neg Hx     Stroke Neg Hx        REVIEW OF SYSTEMS  Review of Systems   Constitutional: Negative for chills, fever and weight loss. Respiratory: Negative for shortness of breath. Cardiovascular: Negative for chest pain. Gastrointestinal: Negative for constipation. Negative for fecal incontinence   Genitourinary: Negative for dysuria. Negative for urinary incontinence   Musculoskeletal: Positive for back pain and joint pain. Per HPI   Skin: Negative for rash. Neurological: Positive for tingling, focal weakness and headaches. Negative for dizziness and tremors. Endo/Heme/Allergies: Does not bruise/bleed easily. Psychiatric/Behavioral: The patient does not have insomnia. PHYSICAL EXAMINATION  Visit Vitals  /84 (BP 1 Location: Right arm, BP Patient Position: Sitting)   Pulse 78   Temp 97.5 °F (36.4 °C) (Oral)   Ht 6' 1\" (1.854 m)   Wt 195 lb (88.5 kg)   SpO2 97%   BMI 25.73 kg/m²         Accompanied by self. Constitutional:  Well developed, well nourished, in no acute distress. Psychiatric: Affect and mood are appropriate. Integumentary: No rashes or abrasions noted on exposed areas. Cardiovascular/Peripheral Vascular: No peripheral edema is noted BLE. SPINE/MUSCULOSKELETAL EXAM    Cervical spine:  Neck is midline. Normal muscle tone. No focal atrophy is noted. Tenderness to palpation R upper trap. Negative Spurling's sign. Negative Tinel's sign. Negative Vicente's sign.        MOTOR:      Biceps  Triceps Deltoids Wrist Ext Wrist Flex Hand Intrin   Right +4/5 +4/5 +4/5 +4/5 +4/5 4/5   Left +4/5 +4/5 +4/5 +4/5 +4/5 4/5 Weakness of pinch on R 4/5      Ambulation without assistive device. FWB. Written by Brianna Sinha, as dictated by Izabella Sequeira MD.    I, Dr. Izabella Sequeira MD, confirm that all documentation is accurate. Mr. Mary Kong may have a reminder for a \"due or due soon\" health maintenance. I have asked that he contact his primary care provider for follow-up on this health maintenance.

## 2020-10-28 DIAGNOSIS — Z00.00 ROUTINE GENERAL MEDICAL EXAMINATION AT A HEALTH CARE FACILITY: Primary | ICD-10-CM

## 2020-10-28 DIAGNOSIS — Z11.59 NEED FOR HEPATITIS C SCREENING TEST: ICD-10-CM

## 2020-10-28 RX ORDER — PRAVASTATIN SODIUM 40 MG/1
40 TABLET ORAL
Qty: 90 TAB | Refills: 0 | Status: SHIPPED | OUTPATIENT
Start: 2020-10-28 | End: 2020-11-05 | Stop reason: SDUPTHER

## 2020-10-29 NOTE — PROGRESS NOTES
HPI:   Check up  Sees ortho for chronic back pain  +hyperlipidemia (recent chol 186)  Currently w/o chest pain/abd. discomfort; no dyspnea, cough or pedal edema; denies constitutional complaints of fever, night sweats or wt loss; no evidence of GI/ hemorrhage; no polyuria/polydipsia. ROS is otherwise negative. Past Medical History:   Diagnosis Date    Generalized osteoarthrosis, involving multiple sites     Headache(784.0) April 2011    Low back pain     Lumbar postlaminectomy syndrome     Mixed hyperlipidemia     Neck pain     with headaches    Right arm pain April 2011       Past Surgical History:   Procedure Laterality Date    HX CERVICAL FUSION      C3-C4    HX COLONOSCOPY  12/2012; 1/2018    polyps, diverticula; 2nd colo neg    HX LUMBAR FUSION      L4-L5 with instrumentation    HX SHOULDER ARTHROSCOPY Right 01/2017    HX TONSIL AND ADENOIDECTOMY         Social History     Socioeconomic History    Marital status:      Spouse name: Not on file    Number of children: Not on file    Years of education: Not on file    Highest education level: Not on file   Occupational History    Not on file   Social Needs    Financial resource strain: Not on file    Food insecurity     Worry: Not on file     Inability: Not on file    Transportation needs     Medical: Not on file     Non-medical: Not on file   Tobacco Use    Smoking status: Never Smoker    Smokeless tobacco: Former User   Substance and Sexual Activity    Alcohol use:  Yes     Alcohol/week: 0.0 standard drinks    Drug use: No    Sexual activity: Not on file   Lifestyle    Physical activity     Days per week: Not on file     Minutes per session: Not on file    Stress: Not on file   Relationships    Social connections     Talks on phone: Not on file     Gets together: Not on file     Attends Anabaptism service: Not on file     Active member of club or organization: Not on file     Attends meetings of clubs or organizations: Not on file     Relationship status: Not on file    Intimate partner violence     Fear of current or ex partner: Not on file     Emotionally abused: Not on file     Physically abused: Not on file     Forced sexual activity: Not on file   Other Topics Concern     Service Not Asked    Blood Transfusions Not Asked    Caffeine Concern Not Asked    Occupational Exposure Not Asked   Chares Smiling Hazards Not Asked    Sleep Concern Not Asked    Stress Concern Not Asked    Weight Concern Not Asked    Special Diet Not Asked    Back Care Not Asked    Exercise Not Asked    Bike Helmet Not Asked   [address] Not Asked    Self-Exams Not Asked   Social History Narrative    Not on file       Allergies   Allergen Reactions    Morphine Other (comments)     Hallucinations    Percocet [Oxycodone-Acetaminophen] Other (comments)     Patients states he doesn't like weaning off of them. Family History   Problem Relation Age of Onset    Cancer Sister     Diabetes Neg Hx     Heart Disease Neg Hx     Hypertension Neg Hx     Stroke Neg Hx        Current Outpatient Medications   Medication Sig Dispense Refill    tiZANidine (ZANAFLEX) 2 mg tablet 1-2 tabs po QHS prn pain 60 Tab 1    pravastatin (PRAVACHOL) 40 mg tablet Take 1 Tab by mouth nightly. 90 Tab 0    aspirin-acetaminophen-caffeine (Excedrin Migraine) 250-250-65 mg per tablet Take 1 Tab by mouth.  ascorbic acid, vitamin C, (VITAMIN C) 1,000 mg tablet Take 1,000 mg by mouth daily.  vitamin E (AQUA GEMS) 400 unit capsule Take 400 Units by mouth daily.  melatonin 5 mg cap capsule Take 5 mg by mouth nightly.  naproxen sodium (ALEVE) 220 mg cap Take 1 Tab by mouth two (2) times daily as needed.  ASPIRIN/ACETAMINOPHEN/CAFFEINE (EXCEDRIN MIGRAINE PO) Take  by mouth three (3) times daily as needed.              Visit Vitals  /80   Pulse 65   Temp 98 °F (36.7 °C)   Resp 15   Ht 6' 1\" (1.854 m)   Wt 201 lb (91.2 kg)   SpO2 99%   BMI 26.52 kg/m²       PE  WDWN in NAD  HEENT:  OP: clear. Neck: supple w/o mass or bruits. Chest: clear. CV: RRR w/o m,r,g; pulses intact. Abd: soft, NT, w/o HSM or mass. Rectal: heme neg; prostate 3 FBS and smooth  Ext: w/o edema. Neuro: NF. Assessment and Plan    Encounter Diagnoses   Name Primary?     Routine general medical examination at a health care facility Yes    Needs flu shot      No acute issues  hyperlipidemia - continue dietary/exercise efforts  [place] 1/2018 (hx of polyps)  Flu vacc given  No change in rx  OV 12 mos or prn  I have explained plan to patient and the patient verbalizes understanding

## 2020-10-30 ENCOUNTER — HOSPITAL ENCOUNTER (OUTPATIENT)
Dept: LAB | Age: 60
Discharge: HOME | End: 2020-10-30
Payer: COMMERCIAL

## 2020-10-30 ENCOUNTER — APPOINTMENT (OUTPATIENT)
Dept: FAMILY MEDICINE CLINIC | Age: 60
End: 2020-10-30

## 2020-10-30 DIAGNOSIS — G44.86 CERVICOGENIC HEADACHE: ICD-10-CM

## 2020-10-30 DIAGNOSIS — G56.01 CARPAL TUNNEL SYNDROME OF RIGHT WRIST: ICD-10-CM

## 2020-10-30 DIAGNOSIS — M47.812 CERVICAL SPONDYLOSIS: ICD-10-CM

## 2020-10-30 DIAGNOSIS — Z98.1 HX OF FUSION OF CERVICAL SPINE: ICD-10-CM

## 2020-10-30 DIAGNOSIS — Z11.59 NEED FOR HEPATITIS C SCREENING TEST: ICD-10-CM

## 2020-10-30 DIAGNOSIS — Z00.00 ROUTINE GENERAL MEDICAL EXAMINATION AT A HEALTH CARE FACILITY: ICD-10-CM

## 2020-10-30 LAB
ALBUMIN SERPL-MCNC: 3.9 G/DL (ref 3.4–5)
ALBUMIN/GLOB SERPL: 1.3 {RATIO} (ref 0.8–1.7)
ALP SERPL-CCNC: 88 U/L (ref 45–117)
ALT SERPL-CCNC: 28 U/L (ref 16–61)
ANION GAP SERPL CALC-SCNC: 6 MMOL/L (ref 3–18)
AST SERPL-CCNC: 16 U/L (ref 10–38)
BASOPHILS # BLD: 0 K/UL (ref 0–0.1)
BASOPHILS NFR BLD: 0 % (ref 0–2)
BILIRUB SERPL-MCNC: 0.6 MG/DL (ref 0.2–1)
BUN SERPL-MCNC: 11 MG/DL (ref 7–18)
BUN/CREAT SERPL: 11 (ref 12–20)
CALCIUM SERPL-MCNC: 9.4 MG/DL (ref 8.5–10.1)
CHLORIDE SERPL-SCNC: 105 MMOL/L (ref 100–111)
CHOLEST SERPL-MCNC: 186 MG/DL
CO2 SERPL-SCNC: 28 MMOL/L (ref 21–32)
CREAT SERPL-MCNC: 1.04 MG/DL (ref 0.6–1.3)
DIFFERENTIAL METHOD BLD: NORMAL
EOSINOPHIL # BLD: 0.1 K/UL (ref 0–0.4)
EOSINOPHIL NFR BLD: 1 % (ref 0–5)
ERYTHROCYTE [DISTWIDTH] IN BLOOD BY AUTOMATED COUNT: 13.5 % (ref 11.6–14.5)
EST. AVERAGE GLUCOSE BLD GHB EST-MCNC: 114 MG/DL
GLOBULIN SER CALC-MCNC: 3.1 G/DL (ref 2–4)
GLUCOSE SERPL-MCNC: 91 MG/DL (ref 74–99)
HBA1C MFR BLD: 5.6 % (ref 4.2–5.6)
HCT VFR BLD AUTO: 46.6 % (ref 36–48)
HDLC SERPL-MCNC: 52 MG/DL (ref 40–60)
HDLC SERPL: 3.6 {RATIO} (ref 0–5)
HGB BLD-MCNC: 15.9 G/DL (ref 13–16)
LDLC SERPL CALC-MCNC: 114.6 MG/DL (ref 0–100)
LIPID PROFILE,FLP: ABNORMAL
LYMPHOCYTES # BLD: 2.9 K/UL (ref 0.9–3.6)
LYMPHOCYTES NFR BLD: 25 % (ref 21–52)
MCH RBC QN AUTO: 30.8 PG (ref 24–34)
MCHC RBC AUTO-ENTMCNC: 34.1 G/DL (ref 31–37)
MCV RBC AUTO: 90.1 FL (ref 74–97)
MONOCYTES # BLD: 1.1 K/UL (ref 0.05–1.2)
MONOCYTES NFR BLD: 10 % (ref 3–10)
NEUTS SEG # BLD: 7.4 K/UL (ref 1.8–8)
NEUTS SEG NFR BLD: 64 % (ref 40–73)
PLATELET # BLD AUTO: 292 K/UL (ref 135–420)
PMV BLD AUTO: 10.1 FL (ref 9.2–11.8)
POTASSIUM SERPL-SCNC: 4.7 MMOL/L (ref 3.5–5.5)
PROT SERPL-MCNC: 7 G/DL (ref 6.4–8.2)
RBC # BLD AUTO: 5.17 M/UL (ref 4.7–5.5)
SODIUM SERPL-SCNC: 139 MMOL/L (ref 136–145)
TRIGL SERPL-MCNC: 97 MG/DL (ref ?–150)
TSH SERPL DL<=0.05 MIU/L-ACNC: 1.42 UIU/ML (ref 0.36–3.74)
VLDLC SERPL CALC-MCNC: 19.4 MG/DL
WBC # BLD AUTO: 11.5 K/UL (ref 4.6–13.2)

## 2020-10-30 PROCEDURE — 36415 COLL VENOUS BLD VENIPUNCTURE: CPT

## 2020-10-30 PROCEDURE — 84443 ASSAY THYROID STIM HORMONE: CPT

## 2020-10-30 PROCEDURE — 86803 HEPATITIS C AB TEST: CPT

## 2020-10-30 PROCEDURE — 80061 LIPID PANEL: CPT

## 2020-10-30 PROCEDURE — 85025 COMPLETE CBC W/AUTO DIFF WBC: CPT

## 2020-10-30 PROCEDURE — 80053 COMPREHEN METABOLIC PANEL: CPT

## 2020-10-30 PROCEDURE — 83036 HEMOGLOBIN GLYCOSYLATED A1C: CPT

## 2020-10-30 RX ORDER — TIZANIDINE 2 MG/1
TABLET ORAL
Qty: 60 TAB | Refills: 1 | Status: SHIPPED | OUTPATIENT
Start: 2020-10-30 | End: 2020-12-24 | Stop reason: SDUPTHER

## 2020-10-30 NOTE — TELEPHONE ENCOUNTER
Last Visit: 20 with MD Canelo Gallardo  Next Appointment: Laura Maldonado to follow-up in 3-6 months  Previous Refill Encounter(s): 20 #60 with 1 refill    Requested Prescriptions     Pending Prescriptions Disp Refills    tiZANidine (ZANAFLEX) 2 mg tablet 60 Tab 1     Si-2 tabs po QHS prn pain

## 2020-11-02 LAB
HCV AB SER IA-ACNC: <0.02 INDEX
HCV AB SERPL QL IA: NEGATIVE
HCV COMMENT,HCGAC: NORMAL

## 2020-11-05 ENCOUNTER — OFFICE VISIT (OUTPATIENT)
Dept: FAMILY MEDICINE CLINIC | Age: 60
End: 2020-11-05
Payer: COMMERCIAL

## 2020-11-05 VITALS
OXYGEN SATURATION: 99 % | WEIGHT: 201 LBS | HEART RATE: 65 BPM | RESPIRATION RATE: 15 BRPM | SYSTOLIC BLOOD PRESSURE: 130 MMHG | DIASTOLIC BLOOD PRESSURE: 80 MMHG | BODY MASS INDEX: 26.64 KG/M2 | HEIGHT: 73 IN | TEMPERATURE: 98 F

## 2020-11-05 DIAGNOSIS — Z00.00 ROUTINE GENERAL MEDICAL EXAMINATION AT A HEALTH CARE FACILITY: Primary | ICD-10-CM

## 2020-11-05 DIAGNOSIS — Z23 NEEDS FLU SHOT: ICD-10-CM

## 2020-11-05 PROCEDURE — 90471 IMMUNIZATION ADMIN: CPT

## 2020-11-05 PROCEDURE — 90686 IIV4 VACC NO PRSV 0.5 ML IM: CPT

## 2020-11-05 PROCEDURE — 99396 PREV VISIT EST AGE 40-64: CPT | Performed by: INTERNAL MEDICINE

## 2020-11-05 RX ORDER — PRAVASTATIN SODIUM 40 MG/1
40 TABLET ORAL
Qty: 90 TAB | Refills: 3 | Status: SHIPPED | OUTPATIENT
Start: 2020-11-05 | End: 2021-11-16 | Stop reason: SDUPTHER

## 2020-12-24 DIAGNOSIS — Z98.1 HX OF FUSION OF CERVICAL SPINE: ICD-10-CM

## 2020-12-24 DIAGNOSIS — M47.812 CERVICAL SPONDYLOSIS: ICD-10-CM

## 2020-12-24 DIAGNOSIS — G44.86 CERVICOGENIC HEADACHE: ICD-10-CM

## 2020-12-24 DIAGNOSIS — G56.01 CARPAL TUNNEL SYNDROME OF RIGHT WRIST: ICD-10-CM

## 2020-12-24 RX ORDER — TIZANIDINE 2 MG/1
2-4 TABLET ORAL
Qty: 60 TAB | Refills: 1 | Status: SHIPPED | OUTPATIENT
Start: 2020-12-24 | End: 2021-05-11

## 2020-12-24 NOTE — TELEPHONE ENCOUNTER
Last Visit: 8/13/20 with MD Yazmin Ziegler  Next Appointment: none  Previous Refill Encounter(s): 10/30/20 #60 with 1 refill    Requested Prescriptions     Pending Prescriptions Disp Refills    tiZANidine (ZANAFLEX) 2 mg tablet 60 Tab 1     Sig: Take 1-2 Tabs by mouth nightly as needed for Pain.

## 2021-02-11 ENCOUNTER — OFFICE VISIT (OUTPATIENT)
Dept: ORTHOPEDIC SURGERY | Age: 61
End: 2021-02-11
Payer: OTHER MISCELLANEOUS

## 2021-02-11 VITALS
WEIGHT: 206 LBS | HEIGHT: 73 IN | HEART RATE: 79 BPM | BODY MASS INDEX: 27.3 KG/M2 | SYSTOLIC BLOOD PRESSURE: 149 MMHG | DIASTOLIC BLOOD PRESSURE: 90 MMHG | OXYGEN SATURATION: 97 % | TEMPERATURE: 98.2 F

## 2021-02-11 DIAGNOSIS — M62.838 MUSCLE SPASM: ICD-10-CM

## 2021-02-11 DIAGNOSIS — G44.86 CERVICOGENIC HEADACHE: Primary | ICD-10-CM

## 2021-02-11 DIAGNOSIS — M47.812 CERVICAL SPONDYLOSIS: ICD-10-CM

## 2021-02-11 PROCEDURE — 99213 OFFICE O/P EST LOW 20 MIN: CPT | Performed by: NURSE PRACTITIONER

## 2021-02-11 RX ORDER — BACLOFEN 10 MG/1
10 TABLET ORAL
Qty: 30 TAB | Refills: 2 | Status: SHIPPED | OUTPATIENT
Start: 2021-02-11 | End: 2021-05-11

## 2021-02-11 NOTE — PROGRESS NOTES
Chief complaint   Chief Complaint   Patient presents with    Neck Pain       History of Present Illness:  Brittany Parish is a  63 y.o.  male comes in today for follow-up of his chronic neck pain. He was last seen by Dr. Isidro Sams August 2020 with MRI review. The MRI showed his fusion at C4-5 and some arthritis at C6-7. Dr. Isidro Sams felt most of his pain was muscular related. He continues to have some cervicogenic headaches. He has been taking Zanaflex 4 mg at bedtime which she states helps him sleep better but is not really helping with his posterior neck pain that much. He states it is worse in the morning and sometimes his neck feels like it gets stuck but then he can get it loose. He is supposed to be rescheduling his right shoulder surgery which is from a [employer]. injury. He is also Worker's Comp. for his neck and is out of work. He is a non-smoker. He states he does have a TENS unit but it does not really help. He states heat does help a little bit. He denies fever bowel bladder dysfunction. Physical Exam: The patient is a 69-year-old male well-developed well-nourished who is alert and oriented with a normal mood and affect. He has a full weightbearing nonantalgic gait. He did not use any assistive device. He has 4 out of 5 strength bilateral upper extremities. Negative Yuliya's. Assessment and Plan: This is a patient with cervicogenic headaches and cervical spondylosis. He has had a previous fusion in the past.  I am going to change his Zanaflex to baclofen. He is also going to try over-the-counter heat patches for the back of his neck. We will see him back in 3-month sooner if needed.         Review of systems:    Past Medical History:   Diagnosis Date    Generalized osteoarthrosis, involving multiple sites     Headache(784.0) April 2011    Low back pain     Lumbar postlaminectomy syndrome     Mixed hyperlipidemia     Neck pain     with headaches    Right arm pain April 2011     Past Surgical History:   Procedure Laterality Date    HX CERVICAL FUSION      C3-C4    HX COLONOSCOPY  12/2012; 1/2018    polyps, diverticula; 2nd colo neg    HX LUMBAR FUSION      L4-L5 with instrumentation    HX SHOULDER ARTHROSCOPY Right 01/2017    HX TONSIL AND ADENOIDECTOMY       Social History     Socioeconomic History    Marital status:      Spouse name: Not on file    Number of children: Not on file    Years of education: Not on file    Highest education level: Not on file   Occupational History    Not on file   Social Needs    Financial resource strain: Not on file    Food insecurity     Worry: Not on file     Inability: Not on file    Transportation needs     Medical: Not on file     Non-medical: Not on file   Tobacco Use    Smoking status: Never Smoker    Smokeless tobacco: Former User   Substance and Sexual Activity    Alcohol use:  Yes     Alcohol/week: 0.0 standard drinks    Drug use: No    Sexual activity: Not on file   Lifestyle    Physical activity     Days per week: Not on file     Minutes per session: Not on file    Stress: Not on file   Relationships    Social connections     Talks on phone: Not on file     Gets together: Not on file     Attends Episcopalian service: Not on file     Active member of club or organization: Not on file     Attends meetings of clubs or organizations: Not on file     Relationship status: Not on file    Intimate partner violence     Fear of current or ex partner: Not on file     Emotionally abused: Not on file     Physically abused: Not on file     Forced sexual activity: Not on file   Other Topics Concern     Service Not Asked    Blood Transfusions Not Asked    Caffeine Concern Not Asked    Occupational Exposure Not Asked   Shahab Ho Hazards Not Asked    Sleep Concern Not Asked    Stress Concern Not Asked    Weight Concern Not Asked    Special Diet Not Asked    Back Care Not Asked    Exercise Not Asked    Bike Helmet Not Asked   [address] [facility] Not Asked    Self-Exams Not Asked   Social History Narrative    Not on file     Family History   Problem Relation Age of Onset    Cancer Sister     Diabetes Neg Hx     Heart Disease Neg Hx     Hypertension Neg Hx     Stroke Neg Hx        Physical Exam:  Visit Vitals  BP (!) 149/90 (BP 1 Location: Left upper arm, BP Patient Position: Sitting, BP Cuff Size: Adult long)   Pulse 79   Temp 98.2 °F (36.8 °C) (Temporal)   Ht 6' 1\" (1.854 m)   Wt 206 lb (93.4 kg)   SpO2 97%   BMI 27.18 kg/m²     Pain Scale: 4/10       has been . reviewed and is appropriate          Diagnoses and all orders for this visit:    1. Cervicogenic headache  -     baclofen (LIORESAL) 10 mg tablet; Take 1 Tab by mouth nightly as needed for Muscle Spasm(s). 2. Cervical spondylosis  -     baclofen (LIORESAL) 10 mg tablet; Take 1 Tab by mouth nightly as needed for Muscle Spasm(s). 3. Muscle spasm  -     baclofen (LIORESAL) 10 mg tablet; Take 1 Tab by mouth nightly as needed for Muscle Spasm(s). Follow-up and Dispositions    · Return in about 3 months (around 5/11/2021) for with STARLA Alcazar.              We have informed Elle Lopez to notify us for immediate appointment if he has any worsening neurogical symptoms or if an emergency situation presents, then call [phone]

## 2021-03-22 ENCOUNTER — TELEPHONE (OUTPATIENT)
Dept: FAMILY MEDICINE CLINIC | Age: 61
End: 2021-03-22

## 2021-03-22 NOTE — TELEPHONE ENCOUNTER
Pt called states having some dizziness and severe headache, forwarded to nurse for triage.  Please adv [phone]

## 2021-05-11 ENCOUNTER — OFFICE VISIT (OUTPATIENT)
Dept: ORTHOPEDIC SURGERY | Age: 61
End: 2021-05-11
Payer: OTHER MISCELLANEOUS

## 2021-05-11 VITALS
HEIGHT: 73 IN | BODY MASS INDEX: 26.9 KG/M2 | OXYGEN SATURATION: 99 % | WEIGHT: 203 LBS | HEART RATE: 69 BPM | TEMPERATURE: 96.8 F | SYSTOLIC BLOOD PRESSURE: 132 MMHG | DIASTOLIC BLOOD PRESSURE: 85 MMHG

## 2021-05-11 DIAGNOSIS — G44.86 CERVICOGENIC HEADACHE: Primary | ICD-10-CM

## 2021-05-11 DIAGNOSIS — M62.838 MUSCLE SPASM: ICD-10-CM

## 2021-05-11 DIAGNOSIS — M47.812 CERVICAL SPONDYLOSIS: ICD-10-CM

## 2021-05-11 PROCEDURE — 99213 OFFICE O/P EST LOW 20 MIN: CPT | Performed by: NURSE PRACTITIONER

## 2021-05-11 RX ORDER — CYCLOBENZAPRINE HCL 10 MG
10 TABLET ORAL
Qty: 30 TAB | Refills: 1 | Status: SHIPPED | OUTPATIENT
Start: 2021-05-11 | End: 2021-07-02 | Stop reason: SDUPTHER

## 2021-05-11 NOTE — PROGRESS NOTES
Chief complaint   Chief Complaint   Patient presents with    Neck Pain       History of Present Illness:  Melany Lindsay is a  63 y.o.  male comes in today for follow-up of his neck pain with cervicogenic headaches. Last visit we changed his Zanaflex to baclofen. He states it really did not help at all and he stopped taking it after a while. He continues to get posterior neck pain especially on the right in the trapezius area closer to his neck. When he turns his head to the right the pain is increased. He also has a right shoulder issue that is a worker comp issue and sees Dr. Delia Jha. He was scheduled for surgery for that but postponed it due to his increasing neck pain. He states he will be calling Dr. Delia Jha to reschedule that but it may take a while since Worker's Comp. will have to reapprove it. He does have a TENS unit and uses it occasionally but it does not seem to help that much. He is also tried Mobic in the past which did not help. He reports he has been fully vaccinated with the Covid vaccine. He denies fever bowel bladder dysfunction. He denies any other new medical issues. Physical Exam: The patient is a 76-year-old male well-developed well-nourished who is alert and oriented with a normal mood and affect. He has a full weightbearing nonantalgic gait. He did not use any assistive device. He has 4 out of 5 strength bilateral upper extremities. Negative Yuliya's. He is tight in his right parous cervical muscles on the right      Assessment and Plan: This is a patient who is having cervicogenic headaches and cervical spondylosis. He states once he gets up and moving about his headache does get better. He also reported some numbness and tingling in the first second and third digits of both hands. Dr. Mandy Paula felt like this was carpal tunnel syndrome. He has not had a EMG but did not wish to have one at this time. He really does not wish to be on medications.   He states that the muscle relaxer before he was prescribed the Zanaflex seem to work the best.  I will give him the Flexeril at bedtime. I will put him in physical therapy with dry needling. We will see him back in 3 months sooner if needed. Review of systems:    Past Medical History:   Diagnosis Date    Generalized osteoarthrosis, involving multiple sites     Headache(784.0) April 2011    Low back pain     Lumbar postlaminectomy syndrome     Mixed hyperlipidemia     Neck pain     with headaches    Right arm pain April 2011     Past Surgical History:   Procedure Laterality Date    HX CERVICAL FUSION      C3-C4    HX COLONOSCOPY  12/2012; 1/2018    polyps, diverticula; 2nd colo neg    HX LUMBAR FUSION      L4-L5 with instrumentation    HX SHOULDER ARTHROSCOPY Right 01/2017    HX TONSIL AND ADENOIDECTOMY       Social History     Socioeconomic History    Marital status:      Spouse name: Not on file    Number of children: Not on file    Years of education: Not on file    Highest education level: Not on file   Occupational History    Not on file   Social Needs    Financial resource strain: Not on file    Food insecurity     Worry: Not on file     Inability: Not on file    Transportation needs     Medical: Not on file     Non-medical: Not on file   Tobacco Use    Smoking status: Never Smoker    Smokeless tobacco: Former User   Substance and Sexual Activity    Alcohol use:  Yes     Alcohol/week: 0.0 standard drinks    Drug use: No    Sexual activity: Not on file   Lifestyle    Physical activity     Days per week: Not on file     Minutes per session: Not on file    Stress: Not on file   Relationships    Social connections     Talks on phone: Not on file     Gets together: Not on file     Attends Jain service: Not on file     Active member of club or organization: Not on file     Attends meetings of clubs or organizations: Not on file     Relationship status: Not on file    Intimate partner violence     Fear of current or ex partner: Not on file     Emotionally abused: Not on file     Physically abused: Not on file     Forced sexual activity: Not on file   Other Topics Concern     Service Not Asked    Blood Transfusions Not Asked    Caffeine Concern Not Asked    Occupational Exposure Not Asked    Hobby Hazards Not Asked    Sleep Concern Not Asked    Stress Concern Not Asked    Weight Concern Not Asked    Special Diet Not Asked    Back Care Not Asked    Exercise Not Asked    Bike Helmet Not Asked   [address] Not Asked    Self-Exams Not Asked   Social History Narrative    Not on file     Family History   Problem Relation Age of Onset    Cancer Sister     Diabetes Neg Hx     Heart Disease Neg Hx     Hypertension Neg Hx     Stroke Neg Hx        Physical Exam:  Visit Vitals  /85 (BP 1 Location: Left upper arm, BP Patient Position: Sitting, BP Cuff Size: Adult long)   Pulse 69   Temp 96.8 °F (36 °C) (Tympanic)   Ht 6' 1\" (1.854 m)   Wt 203 lb (92.1 kg)   SpO2 99% Comment: RA   BMI 26.78 kg/m²     Pain Scale: 4/10       has been . reviewed and is appropriate          Diagnoses and all orders for this visit:    1. Cervicogenic headache  -     REFERRAL TO PHYSICAL THERAPY    2. Cervical spondylosis  -     REFERRAL TO PHYSICAL THERAPY    3. Muscle spasm  -     REFERRAL TO PHYSICAL THERAPY  -     cyclobenzaprine (FLEXERIL) 10 mg tablet; Take 1 Tab by mouth nightly. Follow-up and Dispositions    · Return in about 3 months (around 8/11/2021) for with NP Ottoniel.              We have informed Madisyn Grace to notify us for immediate appointment if he has any worsening neurogical symptoms or if an emergency situation presents, then call [phone]

## 2021-05-11 NOTE — PROGRESS NOTES
Lowell Cummings presents today for   Chief Complaint   Patient presents with    Neck Pain       Is someone accompanying this pt? no    Is the patient using any DME equipment during OV? no    Depression Screening:  3 most recent PHQ Screens 5/11/2021   Little interest or pleasure in doing things Not at all   Feeling down, depressed, irritable, or hopeless Not at all   Total Score PHQ 2 0       Learning Assessment:  Learning Assessment 9/11/2015   PRIMARY LEARNER Patient   HIGHEST LEVEL OF EDUCATION - PRIMARY LEARNER  GRADUATED HIGH SCHOOL OR GED   BARRIERS PRIMARY LEARNER NONE   CO-LEARNER CAREGIVER No   PRIMARY LANGUAGE ENGLISH   LEARNER PREFERENCE PRIMARY OTHER (COMMENT)   ANSWERED BY patient   RELATIONSHIP SELF       Abuse Screening:  Abuse Screening Questionnaire 5/11/2021   Do you ever feel afraid of your partner? N   Are you in a relationship with someone who physically or mentally threatens you? N   Is it safe for you to go home? Y       Fall Risk  Fall Risk Assessment, last 12 mths 5/11/2021   Able to walk? Yes   Fall in past 12 months? 0   Do you feel unsteady? 0   Are you worried about falling 0       Coordination of Care:  1. Have you been to the ER, urgent care clinic since your last visit? no  Hospitalized since your last visit? no    2. Have you seen or consulted any other health care providers outside of the [address] [place] [address] since your last visit? no Include any pap smears or colon screening.  no

## 2021-06-07 NOTE — PROGRESS NOTES
HPI:   Routine check up  Hx notable for hyperlipidemia/IGT  w/o chest pain/abd. discomfort; no dyspnea, cough or pedal edema; denies constitutional complaints of fever, night sweats or wt loss; no evidence of GI/ hemorrhage; no polyuria/polydipsia. Activity is age appropriate despite chronic back pain    ROS is otherwise negative. Past Medical History:   Diagnosis Date    Generalized osteoarthrosis, involving multiple sites     Headache(784.0) April 2011    Low back pain     Lumbar postlaminectomy syndrome     Mixed hyperlipidemia     Neck pain     with headaches    Right arm pain April 2011       Past Surgical History:   Procedure Laterality Date    HX CERVICAL FUSION      C3-C4    HX COLONOSCOPY  12/2012    polyps, diverticula    HX LUMBAR FUSION      L4-L5 with instrumentation    HX TONSIL AND ADENOIDECTOMY         Social History     Social History    Marital status:      Spouse name: N/A    Number of children: N/A    Years of education: N/A     Occupational History    Not on file. Social History Main Topics    Smoking status: Never Smoker    Smokeless tobacco: Former User    Alcohol use 0.0 oz/week     0 Standard drinks or equivalent per week    Drug use: No    Sexual activity: Not on file     Other Topics Concern    Not on file     Social History Narrative       Allergies   Allergen Reactions    Morphine Other (comments)     Hallucinations       Family History   Problem Relation Age of Onset    Cancer Neg Hx     Diabetes Neg Hx     Heart Disease Neg Hx     Hypertension Neg Hx     Stroke Neg Hx        Current Outpatient Prescriptions   Medication Sig Dispense Refill    pravastatin (PRAVACHOL) 40 mg tablet Take 1 Tab by mouth nightly. 90 Tab 3    ASPIRIN/ACETAMINOPHEN/CAFFEINE (EXCEDRIN MIGRAINE PO) Take  by mouth three (3) times daily as needed.              Visit Vitals    /80 (BP 1 Location: Right arm, BP Patient Position: Sitting)    Pulse 71    Temp 97.8 °F (36.6 °C) (Tympanic)    Resp 16    Ht 6' 1\" (1.854 m)    Wt 197 lb (89.4 kg)    SpO2 98%    BMI 25.99 kg/m2       PE  Well nourished in NAD  HEENT:   OP: clear. Neck: supple w/o mass or bruits. Chest: clear. CV: RRR w/o m,r,g; pulses intact. Abd: soft, NT, w/o HSM or mass. Rectal: heme neg; prostate 3 FBS and smooth  Ext: w/o edema. Neuro: NF. Assessment and Plan    Encounter Diagnoses   Name Primary?     Routine general medical examination at a health care facility Yes    IGT (impaired glucose tolerance)     Mixed hyperlipidemia    Hyperlipidemia/IGT - labs soon to assess  Continue dietary/exercise efforts  Flu vaccine already given  [facility] advised (5 year f/u)  OV 12 mos or prn  I have explained plan to patient and the patient verbalizes understanding Labs/Imaging Studies/Medications

## 2021-06-23 ENCOUNTER — HOSPITAL ENCOUNTER (OUTPATIENT)
Dept: PHYSICAL THERAPY | Age: 61
Discharge: HOME | End: 2021-06-23
Attending: NURSE PRACTITIONER
Payer: OTHER MISCELLANEOUS

## 2021-06-23 PROCEDURE — 97162 PT EVAL MOD COMPLEX 30 MIN: CPT

## 2021-06-23 PROCEDURE — 97530 THERAPEUTIC ACTIVITIES: CPT

## 2021-06-23 PROCEDURE — 97110 THERAPEUTIC EXERCISES: CPT

## 2021-06-23 NOTE — PROGRESS NOTES
[facility] - [facility]              [address]        [place], [address]   [phone] fax    Plan of Care/ Statement of Necessity for Physical Therapy Services    Patient name: Daniel Chen Start of Care: 2021   Referral source: Shari Claude, NP : 1960    Medical Diagnosis: Neck pain [M54.2]  Payor: [address] [place] [address] Blvd / Plan: [id number] / Product Type: Workers Comp /  Onset Date:; Symptom flare up: 6-12 months ago    Treatment Diagnosis: Pain in Neck    Prior Hospitalization: see medical history Provider#: [id number]   Medications: Verified on Patient summary List    Comorbidities: Pt reports: Arthritis, C3-C4 Fusion, L4-L5 Fusion    Prior Level of Function: Ind w/ ADL/IADLs, self care tasks and turning head      The Plan of Care and following information is based on the information from the initial evaluation. Assessment/ key information:     Patient presents to clinic secondary to chronic neck pain resulting from an original injury sustained in . Pt underwent C3-C4 spinal fusion in  in which pt reports complete relief of symptoms. States he has experienced multiple flare up in symptoms since, with most recent occurring within past 6-12 months. Todays clinical examination demonstrates (+) cervical flexion test, soft tissue restrictions, joint hypomobility, decreased function (evident by FOTO score), decreased strength, A/PROM, flexibility, and overall mobility limitations/compensatory movement patterns. These limitations affect the patient's ability to turn head, reach/lift overhead, perform ADLs/IADLs, self care tasks and work related tasks efficiently and pain free. The patient would benefit from skilled physical therapy interventions to address the aforementioned impairments in order to return to his PLOF of completing all functional activities pain free and independently.       Evaluation Complexity History HIGH Complexity :3+ comorbidities / personal factors will impact the outcome/ POC ; Examination HIGH Complexity : 4+ Standardized tests and measures addressing body structure, function, activity limitation and / or participation in recreation  ;Presentation MEDIUM Complexity : Evolving with changing characteristics  ; Clinical Decision Making MEDIUM Complexity : FOTO score of 26-74  Overall Complexity Rating: MEDIUM  Problem List: pain affecting function, decrease ROM, decrease strength, decrease ADL/ functional abilitiies, decrease activity tolerance and decrease flexibility/ joint mobility   Treatment Plan may include any combination of the following: Therapeutic exercise, Therapeutic activities, Neuromuscular re-education, Physical agent/modality, Gait/balance training, Manual therapy, Patient education, Self Care training and Functional mobility training  Patient / Family readiness to learn indicated by: asking questions, trying to perform skills and interest  Persons(s) to be included in education: patient (P)  Barriers to Learning/Limitations: None  Patient Goal (s): \"decrease the pain  Patient Self Reported Health Status: good  Rehabilitation Potential: fair     Short Term Goals: To be accomplished in 2 weeks:   1. Patient will become proficient in their HEP and will be compliant in performing that program.   Evaluation: HEP established. 2. Patient will report >/= 50-75% reduction in right UE radicular symptoms in order to return to performing functional ADLs pain free. Evaluation: 0%      Long Term Goals: To be accomplished in 10 treatments:   1. Patient's pain level will be 0-2/10 with activity in order to improve patient's ability to perform normal ADLs. Evaluation: 3-10/10 with activity   2. 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 Evaluation: B Rot: 25%, p!   3.  Patient will increase FOTO score by 10 points to indicate increased functional mobility. Evaluation: 45  4. Patient will report >/= 75% reduction in HA symptoms in order to demonstrate a return to PLOF. Evaluation: 0%   5. Patient will demonstrate pain free right shoulder flexion and abduction AROM >/= 160 degrees in order to perform reaching/lifting tasks pain free   Evaluation: Flex: 128, Abd: 125    Frequency / Duration: Patient to be seen 2 times per week for 10 treatments. Patient/ Caregiver education and instruction: Diagnosis, prognosis, self care, activity modification and exercises   [x]  Plan of care has been reviewed with OPHELIA Agustin DPT 6/23/2021 9:36 AM  ________________________________________________________________________    I certify that the above Therapy Services are being furnished while the patient is under my care. I agree with the treatment plan and certify that this therapy is necessary.     [de-identified] Signature:____________Date:_________TIME:________     Lissett Sterling NP  ** Signature, Date and Time must be completed for valid certification **  Please sign and return to [facility] - [address], [facility]   [phone] fax

## 2021-06-23 NOTE — PROGRESS NOTES
PT DAILY TREATMENT NOTE/CERVICAL [id number]    Patient Name: Ryan Mccain  Date:2021  : 1960  [x]  Patient  Verified  Payor: Jessica Cross / Plan: [id number] / Product Type: Workers Comp /    In time: 9:40A  Out time:10:14A  Total Treatment Time (min): 34  Visit #: 1 of 10    Treatment Area: Neck pain [M54.2]    SUBJECTIVE  Pain Level (0-10 scale): 4  []constant []intermittent []improving [x]worsening []no change since onset    Any medication changes, allergies to medications, adverse drug reactions, diagnosis change, or new procedure performed?: [x] No    [] Yes (see summary sheet for update)  Subjective functional status/changes:    PLOF: Ind w/ ADL/IADLs, self-care tasks and turning head WFL; ongoing flare up of neck pain following injury sustained in   Limitations to PLOF: Increased Pain, Decreased Mobility, Radicular ss/sx, HAs  Mechanism of Injury: Pt reports sustaining injury to neck in  while lifting object at work. Denies hitting head and/or force trauma to skull. C3-C4 Fusion performed in . Reports reinjured neck in , however could not remember how. Participated in skilled PT services with relief of symptoms. Reports experiencing insidious onset of symptom flare up within the past 6 -12 months. Toward end of exam pt mentions he was supposed to undergo surg for R shld, however this was postponed secondary to increasing neck pain. Per medical chart diagnostic imaging reveals c/s OA of C6-C7  Current symptoms/Complaints: Pt describes pain as sharp (mostly in middle to R side of neck) w/ intermittent numbness to R hand (digits III-V). Aggravating factors include movement, turning head quickly or sustaining position for extended period of time.  Also, reports experiencing HA ss/sx (ranging from 1 to 7 days a week); unable to identify trigger and/or duration of HA symptoms, however describes HA as throbbing and states location spans from B aspects of back of head and to forehead States over the counter meds (Excedrin migraine) helps to take edge off and sometimes completely eliminate HA symptoms. Denies dizziness, lightheadedness, LOB, spinning and/or difficulty grasping/holding objects. PMHx/Surgical Hx: Pt reports: Arthritis, C3-C4 Fusion, L4-L5 Fusion  Work Hx: N/A  Pt Goals: decrease the pain     *Pt demonstrates difficulty describing symptoms and explaining timeline of events      OBJECTIVE/EXAMINATION     14 min [x]? ?? Eval                  []? ? ? Re-Eval         10 min Therapeutic Exercise:  [x]? ?? See flow sheet :   Rationale: increase ROM and increase strength to improve the patients ability to perform ADLs with greater ease     10 min Therapeutic Activity:  []???  See flow sheet :   Rationale: increase pt education and awarness of pathology and HEP  to improve the patients ability to return to PLOF                                         With   [x]? ?? TE   [x]? ?? TA   []??? neuro   []? ?? other: Patient Education: [x]??? Review HEP    []? ?? Progressed/Changed HEP based on:   []??? positioning   []? ?? body mechanics   []? ?? transfers   []? ?? heat/ice application    []? ?? other:         Posture: Fwd Head, B Rounded Shlds; Increased thoracic kyphosis and cervical lordosis     Special Tests:            (+) cervical flexion test       MMT:   Gross R Shld: 3+/5   R Elbow Flex/Ext: 4-/5   Gross L Shld: 4-/5   L Elbow Flex/Ext: 4-/5      Palpation:    ??? Mod:  ?????    Location: cervical paraspinals, R UT/LS, R supraspinatus, R Pec, R tricep and bicep        Sensation:  Intact to light touch B UE        Joint Mobility: Hypo mobility of C2-T2 ; B GH inf glides      R Shld AROM (deg):   Flexion 128   Scaption/   Functional ER Base of skull   Functional IR iliace crest    PROM: R Shld Flex: 150 deg, R Shld Abd: 155 deg     L Shld AROM:    Flexion 135   Scaption/   Functional ER base of skull   Functional IR WFL iliac crest     PROM: R Shld Flex: 152 deg, R Shld Abd: 155 deg      Scapulohumoral Control / Rhythm:  Able to eccentrically lower with good control? Left: [x]????? Yes   []????? No     Right: [x]????? Yes   []????? No          Cervical Active Movements: []?? N/A   []? ? Too acute   []? ? Other:  ROM % AROM Comments:pain, area   Forward flexion 50 tight   Extension 25     SB right 25 pain   SB left  25 tight   Rotation right 25  pain   Rotation left 25  pain      Upper Limb Tension Tests: []?? N/A      Unable to test secondary to current symptom irrtiability     Muscle Flexibility: []?? N/A              Scalenes:        []? ? WNL    [x]? ? Tight    [x]? ? R    [x]? ? L              Upper Trap:     []? ? WNL    [x]? ? Tight    [x]? ? R    [x]? ? L              Levator:           []? ? WNL    [x]? ? Tight    [x]? ? R    [x]? ? L              Pect. Minor:     []? ? WNL    [x]? ? Tight    [x]? ? R    [x]? ? L       Pain Level (0-10 scale) post treatment: 4    ASSESSMENT/Changes in Function: See POC    Patient will continue to benefit from skilled PT services to modify and progress therapeutic interventions, address functional mobility deficits, address ROM deficits, address strength deficits, analyze and address soft tissue restrictions, analyze and cue movement patterns, analyze and modify body mechanics/ergonomics and assess and modify postural abnormalities to attain remaining goals.      [x]  See Plan of Care  []  See progress note/recertification  []  See Discharge Summary         Progress towards goals / Updated goals:  See POC     PLAN  [x]  Upgrade activities as tolerated     [x]  Continue plan of care  []  Update interventions per flow sheet       []  Discharge due to:_  []  Other:_      Jo Tovar DPT 6/23/2021  9:37 AM

## 2021-06-30 ENCOUNTER — HOSPITAL ENCOUNTER (OUTPATIENT)
Dept: PHYSICAL THERAPY | Age: 61
Discharge: HOME | End: 2021-06-30
Attending: NURSE PRACTITIONER
Payer: OTHER MISCELLANEOUS

## 2021-06-30 PROCEDURE — 97140 MANUAL THERAPY 1/> REGIONS: CPT | Performed by: PHYSICAL THERAPIST

## 2021-06-30 PROCEDURE — 97110 THERAPEUTIC EXERCISES: CPT | Performed by: PHYSICAL THERAPIST

## 2021-06-30 PROCEDURE — 97112 NEUROMUSCULAR REEDUCATION: CPT | Performed by: PHYSICAL THERAPIST

## 2021-06-30 NOTE — PROGRESS NOTES
PT DAILY TREATMENT NOTE     Patient Name: Monika García  Date:2021  : 1960  [x]  Patient  Verified  Payor: Maria Alejandra Jesus / Plan: [id number] / Product Type: Workers Comp /    In [employer] time:9:30  Total Treatment Time (min): 48  Visit #: 2 of 10    Treatment Area: Neck pain [M54.2]    SUBJECTIVE  Pain Level (0-10 scale): 2-3  Any medication changes, allergies to medications, adverse drug reactions, diagnosis change, or new procedure performed?: [x] No    [] Yes (see summary sheet for update)  Subjective functional status/changes:   [] No changes reported  Patient continues to note neck and right shoulder/arm pain. Headaches continue and occur weekly. OBJECTIVE        23 min Therapeutic Exercise:  [] See flow sheet :   Rationale: increase ROM and increase strength to improve the patients ability to increase his functional activity level. 17 min Neuromuscular Re-education:  []  See flow sheet :Emphasis on recruitment and activation of scapular muscles to improve C-T and scapulohumeral stability. Rationale: increase strength, improve coordination and increase proprioception  to improve the patients ability to increase tolerance to functional lifting. 8 min Manual Therapy:  Gentle LAD; manual stretching UT; TPR   The manual therapy interventions were performed at a separate and distinct time from the therapeutic activities interventions. Rationale: decrease pain, increase ROM, increase tissue extensibility and decrease trigger points to increase ease of motion to improve function. With   [] TE   [] TA   [] neuro   [] other: Patient Education: [x] Review HEP    [] Progressed/Changed HEP based on:   [] positioning   [] body mechanics   [] transfers   [] heat/ice application    [] other:      Other Objective/Functional Measures: Tenderness to palpation along the lower C/S paraspinal muscles.      Pain Level (0-10 scale) post treatment: 3-4    ASSESSMENT/Changes in Function: Patient continues with neck and shoulder pain with limited overhead activity tolerance. Patient will continue to benefit from skilled PT services to modify and progress therapeutic interventions, address functional mobility deficits, address ROM deficits, address strength deficits, analyze and address soft tissue restrictions, analyze and cue movement patterns, analyze and modify body mechanics/ergonomics and assess and modify postural abnormalities to attain remaining goals. [x]  See Plan of Care  []  See progress note/recertification  []  See Discharge Summary         Progress towards goals / Updated goals:  Short Term Goals: To be accomplished in 2 weeks:   1. Patient will become proficient in their HEP and will be compliant in performing that program.   Evaluation: HEP established.   Current:  He reports he is trying to do the exercises but notes some cause pain. 6/30/2021. Progressing. 2. Patient will report >/= 50-75% reduction in right UE radicular symptoms in order to return to performing functional ADLs pain free.   Evaluation: 0%      Long Term Goals: To be accomplished in 10 treatments:   1. Patient's pain level will be 0-2/10 with activity in order to improve patient's ability to perform normal ADLs.    Evaluation: 3-10/10 with activity   2. 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   Evaluation: B Rot: 25%, p!   3. Patient will increase FOTO score by 10 points to indicate increased functional mobility.   Evaluation: 45  4. Patient will report >/= 75% reduction in HA symptoms in order to demonstrate a return to PLOF.   Evaluation: 0%   5.  Patient will demonstrate pain free right shoulder flexion and abduction AROM >/= 160 degrees in order to perform reaching/lifting tasks pain free   Evaluation: Flex: 128, Abd: 125    PLAN  [x]  Upgrade activities as tolerated [x]  Continue plan of care  []  Update interventions per flow sheet       []  Discharge due to:_  []  Other:_      Eun Mcdaniel, PT 6/30/2021  8:42 AM    Future Appointments   Date Time Provider Zaheer Cho   6/30/2021  8:45 AM Joo Johnson, PT [facility]   7/1/2021 10:15 AM Joo Johnson, PT [facility]   7/13/2021  7:15 AM Erica Steinberg, PTA [facility]   7/16/2021  8:00 AM Silvana Steven, PT [facility]   7/20/2021  7:15 AM Erica Steinberg, PTA [facility]   7/23/2021  7:15 AM Joo Johnson, PT [facility]   7/27/2021  7:15 AM Erica Steinberg, PTA [facility]   7/30/2021  7:15 AM Joo Johnson, PT [facility]   8/3/2021  7:15 AM Erica Steinberg, PTA [facility]   8/6/2021  7:15 AM Joo Johnson, PT [facility]   8/10/2021  8:00 AM Julia Alcazar, NP [facility]

## 2021-07-01 ENCOUNTER — HOSPITAL ENCOUNTER (OUTPATIENT)
Dept: PHYSICAL THERAPY | Age: 61
Discharge: HOME | End: 2021-07-01
Attending: NURSE PRACTITIONER
Payer: OTHER MISCELLANEOUS

## 2021-07-01 PROCEDURE — 97140 MANUAL THERAPY 1/> REGIONS: CPT | Performed by: PHYSICAL THERAPIST

## 2021-07-01 PROCEDURE — 97110 THERAPEUTIC EXERCISES: CPT | Performed by: PHYSICAL THERAPIST

## 2021-07-01 PROCEDURE — 97112 NEUROMUSCULAR REEDUCATION: CPT | Performed by: PHYSICAL THERAPIST

## 2021-07-01 NOTE — PROGRESS NOTES
PT DAILY TREATMENT NOTE     Patient Name: Giovanny Powell  Date:2021  : 1960  [x]  Patient  Verified  Payor: [employer]UE [employer] / Plan: Ewelina Hernandez [id number] PPO / Product Type: PPO /    In time:10:15  Out time:11:05  Total Treatment Time (min): 50  Visit #: 3 of 10    Treatment Area: Neck pain [M54.2]    SUBJECTIVE  Pain Level (0-10 scale): 3-4  Any medication changes, allergies to medications, adverse drug reactions, diagnosis change, or new procedure performed?: [x] No    [] Yes (see summary sheet for update)  Subjective functional status/changes:   [] No changes reported  Patient continues to note headaches, this morning he had one that woke him up around 3-4 am.  Headaches are from the posterior C/S and occiput to the crown. OBJECTIVE    Modality rationale: decrease pain to improve the patients ability to increase tolerance to activity.    Min Type Additional Details    [] Estim:  []Unatt       []IFC  []Premod                        []Other:  []w/ice   []w/heat  Position:  Location:    [] Estim: []Att    []TENS instruct  []NMES                    []Other:  []w/US   []w/ice   []w/heat  Position:  Location:    []  Traction: [] Cervical       []Lumbar                       [] Prone          []Supine                       []Intermittent   []Continuous Lbs:  [] before manual  [] after manual    []  Ultrasound: []Continuous   [] Pulsed                           []1MHz   []3MHz W/cm2:  Location:    []  Iontophoresis with dexamethasone         Location: [] Take home patch   [] In clinic   10 []  Ice     [x]  heat  []  Ice massage  []  Laser   []  Anodyne Position: sitting  Location: neck/shoulders.    []  Laser with stim  []  Other:  Position:  Location:    []  Vasopneumatic Device    []  Right     []  Left  Pre-treatment girth:  Post-treatment girth:  Measured at (location):  Pressure:       [] lo [] med [] hi   Temperature: [] lo [] med [] hi   [] Skin assessment post-treatment:  []intact []redness- no adverse reaction    []redness - adverse reaction:     23 min Therapeutic Exercise:  [] See flow sheet :Emphasis on    Rationale: increase ROM and increase strength to improve the patients ability to increase his functional activity level. 9 min Neuromuscular Re-education:  []  See flow sheet :Emphasis on recruitment and activation of scapular muscles to improve C-T and scapulohumeral stability. Rationale: increase strength, improve coordination and increase proprioception  to improve the patients ability to increase tolerance to functional lifting. 8 min Manual Therapy:  Gentle LAD; manual stretching UT; TPR   The manual therapy interventions were performed at a separate and distinct time from the therapeutic activities interventions. Rationale: decrease pain, increase ROM, increase tissue extensibility and decrease trigger points to increase ease of motion to improve function. With   [] TE   [] TA   [] neuro   [] other: Patient Education: [x] Review HEP    [] Progressed/Changed HEP based on:   [] positioning   [] body mechanics   [] transfers   [] heat/ice application    [] other:      Other Objective/Functional Measures: (+) trigger point right UT with tenderness along the right UT to the right lateral C/S. Pain Level (0-10 scale) post treatment: 3    ASSESSMENT/Changes in Function: Patient continues with neck pain with headaches. Patient will continue to benefit from skilled PT services to modify and progress therapeutic interventions, address functional mobility deficits, address ROM deficits, address strength deficits, analyze and address soft tissue restrictions, analyze and cue movement patterns, analyze and modify body mechanics/ergonomics and assess and modify postural abnormalities to attain remaining goals.      [x]  See Plan of Care  []  See progress note/recertification  []  See Discharge Summary         Progress towards goals / Updated goals:  Short Term Goals: To be accomplished in 2 weeks:   1. Patient will become proficient in their HEP and will be compliant in performing that program.   Evaluation: HEP established.   Current:  He reports he is trying to do the exercises but notes some cause pain. 6/30/2021. Progressing. 2. Patient will report >/= 50-75% reduction in right UE radicular symptoms in order to return to performing functional ADLs pain free.   Evaluation: 0%      Long Term Goals: To be accomplished in 10 treatments:   1. Patient's pain level will be 0-2/10 with activity in order to improve patient's ability to perform normal ADLs.    Evaluation: 3-10/10 with activity   2. 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   Evaluation: B Rot: 25%, p!   3. Patient will increase FOTO score by 10 points to indicate increased functional mobility.   Evaluation: 45  4. Patient will report >/= 75% reduction in HA symptoms in order to demonstrate a return to PLOF.   Evaluation: 0%   Current: Patient reports no change, was up this morning with a headache. 7/1/2021. No change.   5. Patient will demonstrate pain free right shoulder flexion and abduction AROM >/= 160 degrees in order to perform reaching/lifting tasks pain free   Evaluation: Flex: 128, Abd: 125    PLAN  [x]  Upgrade activities as tolerated     [x]  Continue plan of care  []  Update interventions per flow sheet       []  Discharge due to:_  []  Other:_      Eun Mcdaniel PT 7/1/2021  10:13 AM    Future Appointments   Date Time Provider Zaheer Cho   7/1/2021 10:15 AM Joo Johnson PT [facility]   7/13/2021  7:15 AM Erica Steinberg PTA [facility]   7/16/2021  8:00 AM Silvana Steven PT [facility]   7/20/2021  7:15 AM Erica Steinberg PTA [facility]   7/23/2021  7:15 AM Joo Johnson PT [facility]   7/27/2021  7:15 AM Erica Steinberg, PTA [facility]   7/30/2021  7:15 AM Joo Johnson PT [facility]   8/3/2021 7:15 AM Tommy Boyer [facility]   8/6/2021  7:15 AM Mando Real PT [facility]   8/10/2021  8:00 AM Macario Alcazar NP [facility]

## 2021-07-02 DIAGNOSIS — M62.838 MUSCLE SPASM: ICD-10-CM

## 2021-07-02 RX ORDER — CYCLOBENZAPRINE HCL 10 MG
10 TABLET ORAL
Qty: 30 TABLET | Refills: 1 | Status: SHIPPED | OUTPATIENT
Start: 2021-07-02 | End: 2021-08-10 | Stop reason: SDUPTHER

## 2021-07-02 NOTE — TELEPHONE ENCOUNTER
Last Visit: 5/11/21 with NP Ottoniel  Next Appointment: 8/10/21 with NP Ottoniel  Previous Refill Encounter(s): 5/11/21 #30 with 1 refill    Requested Prescriptions     Pending Prescriptions Disp Refills    cyclobenzaprine (FLEXERIL) 10 mg tablet 30 Tablet 1     Sig: Take 1 Tablet by mouth nightly.

## 2021-07-13 ENCOUNTER — HOSPITAL ENCOUNTER (OUTPATIENT)
Dept: PHYSICAL THERAPY | Age: 61
Discharge: HOME | End: 2021-07-13
Attending: NURSE PRACTITIONER
Payer: OTHER MISCELLANEOUS

## 2021-07-13 PROCEDURE — 97112 NEUROMUSCULAR REEDUCATION: CPT

## 2021-07-13 PROCEDURE — 97110 THERAPEUTIC EXERCISES: CPT

## 2021-07-13 PROCEDURE — 97140 MANUAL THERAPY 1/> REGIONS: CPT

## 2021-07-13 NOTE — PROGRESS NOTES
PT DAILY TREATMENT NOTE     Patient Name: Vicki Cazares  Date:2021  : 1960  [x]  Patient  Verified  Payor: Rolly Heading / Plan: [id number] / Product Type: Workers Comp /    In [place] time:7:56  Total Treatment Time (min): 44  Visit #: 4 of 10      Treatment Area: Neck pain [M54.2]    SUBJECTIVE  Pain Level (0-10 scale): 3  Any medication changes, allergies to medications, adverse drug reactions, diagnosis change, or new procedure performed?: [x] No    [] Yes (see summary sheet for update)  Subjective functional status/changes:   [] No changes reported  Patient reports he continue to have headaches 2-3x a week. OBJECTIVE        23 min Therapeutic Exercise:  [x] See flow sheet :   Rationale: increase ROM and increase strength to improve the patients ability to increase ease with cervical rotation needed for driving. 13 min Neuromuscular Re-education:  [x]  See flow sheet :Emphasis on recruitment and activation of scapular muscles to improve C-T and scapulohumeral stability. Rationale: increase ROM, increase strength, improve coordination and increase proprioception  to improve the patients ability to increase ease with overhead reaching. 8 min Manual Therapy:    Supine- gentle cervical distraction  Supine- STM/TPR right UT/LS, manual Sidebend stretch on right. The manual therapy interventions were performed at a separate and distinct time from the therapeutic activities interventions. Rationale: decrease pain, increase ROM, increase tissue extensibility and decrease trigger points to increase ease with ADLs.                With   [] TE   [] TA   [] neuro   [] other: Patient Education: [x] Review HEP    [] Progressed/Changed HEP based on:   [] positioning   [] body mechanics   [] transfers   [] heat/ice application    [] other:      Other Objective/Functional Measures: AROM right shoulder flexion 125 deg, abduction = 90 deg     Pain Level (0-10 scale) post treatment: 4    ASSESSMENT/Changes in Function: Patient present with increase in symptoms down right UE with performing supine exercises. Patient will continue to benefit from skilled PT services to modify and progress therapeutic interventions, address functional mobility deficits, address ROM deficits, address strength deficits and analyze and address soft tissue restrictions to attain remaining goals. []  See Plan of Care   []  See progress note/recertification  []  See Discharge Summary         Progress towards goals / Updated goals:  Short Term Goals: To be accomplished in 2 weeks:   1. Patient will become proficient in their HEP and will be compliant in performing that program.   Evaluation: HEP established.   Current: MET: pt reports performing HEP. 7/13/21  2. Patient will report >/= 50-75% reduction in right UE radicular symptoms in order to return to performing functional ADLs pain free.   Evaluation: 0%  Current: remains: 0% improvement since starting therapy. 7/13/21      Long Term Goals: To be accomplished in 10 treatments:   1. Patient's pain level will be 0-2/10 with activity in order to improve patient's ability to perform normal ADLs.    Evaluation: 3-10/10 with activity   2. 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   Evaluation: B Rot: 25%, p!   3. Patient will increase FOTO score by 10 points to indicate increased functional mobility.   Evaluation: 45  4. Patient will report >/= 75% reduction in HA symptoms in order to demonstrate a return to PLOF.   Evaluation: 0%   Current: Patient reports no change, was up this morning with a headache. 7/1/2021. No change.   5. Patient will demonstrate pain free right shoulder flexion and abduction AROM >/= 160 degrees in order to perform reaching/lifting tasks pain free   Evaluation: Flex: 128, Abd: 125  Current: Regressed: AROM right shoulder flexion 125 deg, abduction = 90 deg.  7/13/21    PLAN  []  Upgrade activities as tolerated     [x]  Continue plan of care  []  Update interventions per flow sheet       []  Discharge due to:_  []  Other:_      Suzanne Crook, PTA 7/13/2021  7:08 AM    Future Appointments   Date Time Provider Zaheer Cho   7/13/2021  7:15 AM Jorgito Cagle, PTA [facility]   7/16/2021  8:00 AM Jorgito Cagle, PTA [facility]   7/20/2021  7:15 AM Jorgito Cagle, PTA [facility]   7/23/2021  7:15 AM [facility]li Ramos, PT [facility]   7/27/2021  7:15 AM Jorgito Cagle, PTA [facility]   7/30/2021  7:15 AM Uli Ramos, PT [facility]   8/3/2021  7:15 AM Jorgito Cagle, PTA [facility]   8/6/2021  7:15 AM Uli Ramos, PT [facility]   8/10/2021  8:00 AM Doris Alcazar NP [facility]

## 2021-07-16 ENCOUNTER — HOSPITAL ENCOUNTER (OUTPATIENT)
Dept: PHYSICAL THERAPY | Age: 61
Discharge: HOME | End: 2021-07-16
Attending: NURSE PRACTITIONER
Payer: OTHER MISCELLANEOUS

## 2021-07-16 PROCEDURE — 97112 NEUROMUSCULAR REEDUCATION: CPT

## 2021-07-16 PROCEDURE — 97140 MANUAL THERAPY 1/> REGIONS: CPT

## 2021-07-16 PROCEDURE — 97110 THERAPEUTIC EXERCISES: CPT

## 2021-07-16 NOTE — PROGRESS NOTES
PT DAILY TREATMENT NOTE     Patient Name: Marisol High  Date:2021  : 1960  [x]  Patient  Verified  Payor: [facility] / Plan: [id number] / Product Type: Workers Comp /    In time:7:57  Out time:8:44  Total Treatment Time (min): 47  Visit #: 5 of 10    Treatment Area: Neck pain [M54.2]    SUBJECTIVE  Pain Level (0-10 scale): 4  Any medication changes, allergies to medications, adverse drug reactions, diagnosis change, or new procedure performed?: [x] No    [] Yes (see summary sheet for update)  Subjective functional status/changes:   [] No changes reported  Patient reports that the pain down his right arm seems worse this week. OBJECTIVE          23 min Therapeutic Exercise:  [x]? See flow sheet :   Rationale: increase ROM and increase strength to improve the patients ability to increase ease with cervical rotation needed for driving.            16 min Neuromuscular Re-education:  [x]? See flow sheet :Emphasis on recruitment and activation of scapular muscles to improve C-T and scapulohumeral stability. Rationale: increase ROM, increase strength, improve coordination and increase proprioception  to improve the patients ability to increase ease with overhead reaching.      8 min Manual Therapy:    Supine- gentle cervical distraction  Supine- STM/TPR right UT/LS, manual Sidebend stretch on right. The manual therapy interventions were performed at a separate and distinct time from the therapeutic activities interventions. Rationale: decrease pain, increase ROM, increase tissue extensibility and decrease trigger points to increase ease with ADLs.          With   [] TE   [] TA   [] neuro   [] other: Patient Education: [x] Review HEP    [] Progressed/Changed HEP based on:   [] positioning   [] body mechanics   [] transfers   [] heat/ice application    [] other:      Other Objective/Functional Measures: unable to perform B UE reaching overhead. Pain Level (0-10 scale) post treatment: 4    ASSESSMENT/Changes in Function: Limited with progressing patient due to radicular pain along right UE. Patient will continue to benefit from skilled PT services to modify and progress therapeutic interventions, address functional mobility deficits, address ROM deficits, address strength deficits, analyze and address soft tissue restrictions and analyze and cue movement patterns to attain remaining goals. []  See Plan of Care  []  See progress note/recertification  []  See Discharge Summary         Progress towards goals / Updated goals:  Short Term Goals: To be accomplished in 2 weeks:   1. Patient will become proficient in their HEP and will be compliant in performing that program.   Evaluation: HEP established.   Current: MET: pt reports performing HEP. 7/13/21  2. Patient will report >/= 50-75% reduction in right UE radicular symptoms in order to return to performing functional ADLs pain free.   Evaluation: 0%  Current: remains: 0% improvement since starting therapy. 7/13/21      Long Term Goals: To be accomplished in 10 treatments:   1. Patient's pain level will be 0-2/10 with activity in order to improve patient's ability to perform normal ADLs.    Evaluation: 3-10/10 with activity   2. 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   Evaluation: B Rot: 25%, p!   3. Patient will increase FOTO score by 10 points to indicate increased functional mobility.   Evaluation: 45  4. Patient will report >/= 75% reduction in HA symptoms in order to demonstrate a return to PLOF.   Evaluation: 0%   Current: Patient reports no change, was up this morning with a headache.  7/1/2021.  No change.   5. Patient will demonstrate pain free right shoulder flexion and abduction AROM >/= 160 degrees in order to perform reaching/lifting tasks pain free   Evaluation: Flex: 128, Abd: 125  Current: Regressed: AROM right shoulder flexion 125 deg, abduction = 90 deg.  7/13/21       PLAN  []  Upgrade activities as tolerated     [x]  Continue plan of care  []  Update interventions per flow sheet       []  Discharge due to:_  []  Other:_      Jason May, PTA 7/16/2021  8:00 AM    Future Appointments   Date Time Provider Zaheer Cho   7/20/2021  7:15 AM Meredith Lira, PTA [facility]   7/23/2021  7:15 AM Kelly Hinton, PT [facility]   7/27/2021  7:15 AM Meredith Lira, PTA [facility]   7/30/2021  7:15 AM Kelly Hinton, PT [facility]   8/3/2021  7:15 AM Meredith Lira, PTA [facility]   8/6/2021  7:15 AM Kelly Hinton, PT [facility]   8/10/2021  8:00 AM Blount, Adonis Seip, NP [facility]

## 2021-07-20 ENCOUNTER — HOSPITAL ENCOUNTER (OUTPATIENT)
Dept: PHYSICAL THERAPY | Age: 61
Discharge: HOME | End: 2021-07-20
Attending: NURSE PRACTITIONER
Payer: OTHER MISCELLANEOUS

## 2021-07-20 PROCEDURE — 97140 MANUAL THERAPY 1/> REGIONS: CPT

## 2021-07-20 PROCEDURE — 97112 NEUROMUSCULAR REEDUCATION: CPT

## 2021-07-20 PROCEDURE — 97110 THERAPEUTIC EXERCISES: CPT

## 2021-07-20 NOTE — PROGRESS NOTES
PT DAILY TREATMENT NOTE     Patient Name: Carlito Alford  Date:2021  : 1960  [x]  Patient  Verified  Payor: Zack Raman / Plan: [id number] / Product Type: Workers Comp /    In [address]  Total Treatment Time (min): 54  Visit #: 6 of 10    Treatment Area: Neck pain [M54.2]    SUBJECTIVE  Pain Level (0-10 scale): 5  Any medication changes, allergies to medications, adverse drug reactions, diagnosis change, or new procedure performed?: [x] No    [] Yes (see summary sheet for update)  Subjective functional status/changes:   [] No changes reported  Patient has noticed minimal improvement since starting therapy. Patient states that the frequency and intensity of neck pain have improved. OBJECTIVE     23 min Therapeutic Exercise:  [x]? ? See flow sheet :   Rationale: increase ROM and increase strength to improve the patients ability to increase ease with cervical rotation needed for driving.            23 min Neuromuscular Re-education:  [x]? ?  See flow sheet :Emphasis on recruitment and activation of scapular muscles to improve C-T and scapulohumeral stability. Rationale: increase ROM, increase strength, improve coordination and increase proprioception  to improve the patients ability to increase ease with overhead reaching.      8 min Manual Therapy:    Supine- gentle cervical distraction  Supine- STM/TPR right UT/LS, manual Sidebend stretch on right.    The manual therapy interventions were performed at a separate and distinct time from the therapeutic activities interventions. Rationale: decrease pain, increase ROM, increase tissue extensibility and decrease trigger points to increase ease with ADLs.             With   [] TE   [] TA   [] neuro   [] other: Patient Education: [x] Review HEP    [] Progressed/Changed HEP based on:   [] positioning   [] body mechanics   [] transfers   [] heat/ice application    [] other:      Other Objective/Functional Measures: pain range 3/10-5/10     Pain Level (0-10 scale) post treatment: 3    ASSESSMENT/Changes in Function: Patient has made little to no improvement toward goals. Patient has reported improvement of frequency and intensity of neck pain, but reports no change in headaches, radicular symptoms down right UE and is limited with overhead lifting. Patient will continue to benefit from skilled PT services to modify and progress therapeutic interventions, address functional mobility deficits, address ROM deficits, address strength deficits and analyze and address soft tissue restrictions to attain remaining goals. []  See Plan of Care  []  See progress note/recertification  []  See Discharge Summary         Progress towards goals / Updated goals:  Short Term Goals: To be accomplished in 2 weeks:   1. Patient will become proficient in their HEP and will be compliant in performing that program.   Evaluation: HEP established.   Current: MET: pt reports performing HEP. 7/13/21  2. Patient will report >/= 50-75% reduction in right UE radicular symptoms in order to return to performing functional ADLs pain free.   Evaluation: 0%  Current: remains: 0% improvement since starting therapy. 7/20/21      Long Term Goals: To be accomplished in 10 treatments:   1. Patient's pain level will be 0-2/10 with activity in order to improve patient's ability to perform normal ADLs.    Evaluation: 3-10/10 with activity  Current: Progressing: pain range 3/10-5/10. 7/20/21   2. 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   Evaluation: B Rot: 25%, p!   3. Patient will increase FOTO score by 10 points to indicate increased functional mobility.   Evaluation: 45  4. Patient will report >/= 75% reduction in HA symptoms in order to demonstrate a return to PLOF.   Evaluation: 0%   Current:No change.  Patient reports no change, was up this morning with a headache.  7/20/2021.    5. Patient will demonstrate pain free right shoulder flexion and abduction AROM >/= 160 degrees in order to perform reaching/lifting tasks pain free   Evaluation: Flex: 128, Abd: 125  Current: Regressed: AROM right shoulder flexion 125 deg, abduction = 90 deg.  7/13/21       PLAN  []  Upgrade activities as tolerated     [x]  Continue plan of care  []  Update interventions per flow sheet       []  Discharge due to:_  []  Other:_      Bharat Grant PTA 7/20/2021  7:14 AM    Future Appointments   Date Time Provider Zaheer Cho   7/20/2021  7:15 AM Sugar Caraballo, PTA [facility]   7/23/2021  7:15 AM Nan Brace, PT [facility]   7/27/2021  7:15 AM Cretabatha Caraballo, PTA [facility]   7/30/2021  7:15 AM Nan Brace, PT [facility]   8/3/2021  7:15 AM Creasie Caraballo, PTA [facility]   8/6/2021  7:15 AM Nan Brace, PT [facility]   8/10/2021  8:00 AM Sumit Alcazar NP [facility]

## 2021-07-23 ENCOUNTER — HOSPITAL ENCOUNTER (OUTPATIENT)
Dept: PHYSICAL THERAPY | Age: 61
Discharge: HOME | End: 2021-07-23
Attending: NURSE PRACTITIONER
Payer: OTHER MISCELLANEOUS

## 2021-07-23 PROCEDURE — 97112 NEUROMUSCULAR REEDUCATION: CPT | Performed by: PHYSICAL THERAPIST

## 2021-07-23 PROCEDURE — 97110 THERAPEUTIC EXERCISES: CPT | Performed by: PHYSICAL THERAPIST

## 2021-07-23 PROCEDURE — 97140 MANUAL THERAPY 1/> REGIONS: CPT | Performed by: PHYSICAL THERAPIST

## 2021-07-23 NOTE — PROGRESS NOTES
[facility] - [facility]              [address]        [place], [address]   [phone] fax    Physician Update  [x] Progress Note  [] Discharge Summary  Patient name: Parminder Hilliard Start of Care: 2021   Referral source: Marti Aguero NP : 1960   Medical/Treatment Diagnosis: Neck pain [M54.2]  Payor: Bhargavi Semen / Plan: [id number] [facility] / Product Type: Workers Comp /  Onset Date:; Symptom flare up 6-12 months. Prior Hospitalization: see medical history Provider#: [id number]   Medications: Verified on Patient Summary List    Comorbidities: Pt reports: Arthritis, C3-C4 Fusion, L4-L5 Fusion   Prior Level of Function: Ind w/ ADL/IADLs, self care tasks and turning head             Visits from Start of Care: 7    Missed Visits: 0    Status at Evaluation/Last Progress Note:   Short Term Goals: To be accomplished in 2 weeks:   1. Patient will become proficient in their HEP and will be compliant in performing that program.   Evaluation: HEP established.   2. Patient will report >/= 50-75% reduction in right UE radicular symptoms in order to return to performing functional ADLs pain free.   Evaluation: 0%      Long Term Goals: To be accomplished in 10 treatments:   1. Patient's pain level will be 0-2/10 with activity in order to improve patient's ability to perform normal ADLs.    Evaluation: 3-10/10 with activity   2. 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   Evaluation: B Rot: 25%, p!   3. Patient will increase FOTO score by 10 points to indicate increased functional mobility.   Evaluation: 45  4. Patient will report >/= 75% reduction in HA symptoms in order to demonstrate a return to PLOF.   Evaluation: 0%   5.  Patient will demonstrate pain free right shoulder flexion and abduction AROM >/= 160 degrees in order to perform reaching/lifting tasks pain free   Evaluation: Flex: 128, Abd: 125    Progress towards Goals:   Short Term Goals: To be accomplished in 2 weeks:   1. Patient will become proficient in their HEP and will be compliant in performing that program.   Evaluation: HEP established.   Current: MET: pt reports performing HEP. 7/13/21  2. Patient will report >/= 50-75% reduction in right UE radicular symptoms in order to return to performing functional ADLs pain free.   Evaluation: 0%  Current:  0% improvement since starting therapy. 7/23/21. No change      Long Term Goals: To be accomplished in 10 treatments:   1. Patient's pain level will be 0-2/10 with activity in order to improve patient's ability to perform normal ADLs.    Evaluation: 3-10/10 with activity  Current:  Pain range 3/10-5/10. 7/23/21. Progressing.   2. 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   Evaluation: B Rot: 25%, p!   Current: Rotation equally limited with pain 4/10 on left rotation; 2-3/10 to the right. No change. 3. Patient will increase FOTO score by 10 points to indicate increased functional mobility.   Evaluation: 45  Current:  FOTO = 57.  7/23/2021. Goal Met.  4. Patient will report >/= 75% reduction in HA symptoms in order to demonstrate a return to PLOF.   Evaluation: 0%   Current:  Patient reports no change, was up this morning with a headache.  7/23/2021. No change.   5. Patient will demonstrate pain free right shoulder flexion and abduction AROM >/= 160 degrees in order to perform reaching/lifting tasks pain free   Evaluation: Flex: 128, Abd: 125  Current:  AROM right shoulder flexion 134 deg, abduction = 117 deg. 7/23/21. Flexion progressing; abduction regressed from evaluation.       Goals: to be achieved in 4 visits:  Short Term Goals: To be accomplished in 2 weeks:   1. Patient will become proficient in their HEP and will be compliant in performing that program.   Evaluation: HEP established.   Current: MET: pt reports performing HEP. 7/13/21  2. Patient will report >/= 50-75% reduction in right UE radicular symptoms in order to return to performing functional ADLs pain free.   Evaluation: 0%  Current:  0% improvement since starting therapy. 7/23/21. No change      Long Term Goals: To be accomplished in 10 treatments:   1. Patient's pain level will be 0-2/10 with activity in order to improve patient's ability to perform normal ADLs.    Evaluation: 3-10/10 with activity  Current:  Pain range 3/10-5/10. 7/23/21. Progressing.   2. 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   Evaluation: B Rot: 25%, p!   Current: Rotation equally limited with pain 4/10 on left rotation; 2-3/10 to the right. No change. 3.  Patient will report >/= 75% reduction in HA symptoms in order to demonstrate a return to PLOF.   Evaluation: 0%   Current:  Patient reports no change, was up this morning with a headache.  7/23/2021. No change.   4. Patient will demonstrate pain free right shoulder flexion and abduction AROM >/= 160 degrees in order to perform reaching/lifting tasks pain free   Evaluation: Flex: 128, Abd: 125  Current:  AROM right shoulder flexion 134 deg, abduction = 117 deg. 7/23/21. Flexion progressing; abduction regressed from evaluation. ASSESSMENT/RECOMMENDATIONS:  [x]Continue therapy per initial plan/protocol at a frequency of 4 more treatments, then f/u with his doctor.   []Continue therapy with the following recommended changes:_____________________      _____________________________________________________________________  []Discontinue therapy progressing towards or have reached established goals  []Discontinue therapy due to lack of appreciable progress towards goals  []Discontinue therapy due to lack of attendance or compliance  []Await Physician's recommendations/decisions regarding therapy  []Other:________________________________________________________________    Thank you for this referral.    [place] Kayser, PT 7/23/2021 8:43 AM    NOTE TO PHYSICIAN:  PLEASE COMPLETE THE ORDERS BELOW AND   FAX TO [facility]: [phone]  If you are unable to process this request in 24 hours please contact our office: [phone]    []  I have read the above report and request that my patient continue as recommended. []  I have read the above report and request that my patient continue therapy with the following changes/special instructions:________________________________________  []I have read the above report and request that my patient be discharged from therapy.     [de-identified] Signature:____________Date:_________TIME:________     Alexus Jin NP  ** Signature, Date and Time must be completed for valid certification **

## 2021-07-23 NOTE — PROGRESS NOTES
PT DAILY TREATMENT NOTE     Patient Name: Nora Meza  Date:2021  : 1960  [x]  Patient  Verified  Payor: Toño Falling / Plan: [id number] [facility] / Product Type: Workers Comp /    In [address] [place] [address]  Total Treatment Time (min): 76  Visit #: 7 of 10    Treatment Area: Neck pain [M54.2]    SUBJECTIVE  Pain Level (0-10 scale): Neck pain 3  Any medication changes, allergies to medications, adverse drug reactions, diagnosis change, or new procedure performed?: [x] No    [] Yes (see summary sheet for update)  Subjective functional status/changes:   [] No changes reported  Patient notes CC right UE pain with numbness into his hand. States headaches hurt worse than the neck pain. Headache in the occiput. Headache has been steady this whole week. Does not change position. Takes Excedrin Migraine. Headache pain has been up to 10/10 this past /Monday. His neck is feeling loose and \"not as tight\", \"not locked up\". Arm pain and headache pain is not improved. He notes temporary relief of his headache with manual therapy techniques. OBJECTIVE    Modality rationale: decrease pain to improve the patients ability to increase tolerance to movement and activity.    Min Type Additional Details    [] Estim:  []Unatt       []IFC  []Premod                        []Other:  []w/ice   []w/heat  Position:  Location:    [] Estim: []Att    []TENS instruct  []NMES                    []Other:  []w/US   []w/ice   []w/heat  Position:  Location:    []  Traction: [] Cervical       []Lumbar                       [] Prone          []Supine                       []Intermittent   []Continuous Lbs:  [] before manual  [] after manual    []  Ultrasound: []Continuous   [] Pulsed                           []1MHz   []3MHz W/cm2:  Location:    []  Iontophoresis with dexamethasone         Location: [] Take home patch   [] In clinic   10 []  Ice     [x]  heat  []  Ice massage  [] Laser   []  Anodyne Position: Sitting  Location: Neck/shoulders.    []  Laser with stim  []  Other:  Position:  Location:    []  Vasopneumatic Device    []  Right     []  Left  Pre-treatment girth:  Post-treatment girth:  Measured at (location):  Pressure:       [] lo [] med [] hi   Temperature: [] lo [] med [] hi   [] Skin assessment post-treatment:  []intact []redness- no adverse reaction    []redness - adverse reaction:     30 min Therapeutic Exercise:  [] See flow sheet :   Rationale: increase ROM and increase strength to improve the patients ability to increase ease with cervical rotation needed for driving. 26 min Neuromuscular Re-education:  []  See flow sheet :Emphasis on recruitment and activation of scapular muscles to improve C-T and scapulohumeral stability. Rationale: increase strength, improve coordination and increase proprioception  to improve the patients ability to increase ease with overhead reaching. 10 min Manual Therapy:  Gentle manual traction; SOR; gentle stretching rotation and SB. The manual therapy interventions were performed at a separate and distinct time from the therapeutic activities interventions. Rationale: decrease pain, increase ROM and increase tissue extensibility to increase ease of motion to improve function. With   [] TE   [] TA   [] neuro   [] other: Patient Education: [x] Review HEP    [] Progressed/Changed HEP based on:   [] positioning   [] body mechanics   [] transfers   [] heat/ice application    [] other:      Other Objective/Functional Measures:  AROM right shoulder flexion 134 deg, abduction = 117 deg. 7/23/21. Flexion progressing; abduction regressed from evaluation. Pain Level (0-10 scale) post treatment: 1-2    ASSESSMENT/Changes in Function: Patient does have improved functioin as noted on his FOTO assessment. Pain in the arm and numbness in his hand as well as his headaches are relatively unchanged.   Temporary headache relief with manual techniques. Patient will continue to benefit from skilled PT services to modify and progress therapeutic interventions, address functional mobility deficits, address ROM deficits, address strength deficits and analyze and address soft tissue restrictions to attain remaining goals. [x]  See Plan of Care  []  See progress note/recertification  []  See Discharge Summary         Progress towards goals / Updated goals:  Short Term Goals: To be accomplished in 2 weeks:   1. Patient will become proficient in their HEP and will be compliant in performing that program.   Evaluation: HEP established.   Current: MET: pt reports performing HEP. 7/13/21  2. Patient will report >/= 50-75% reduction in right UE radicular symptoms in order to return to performing functional ADLs pain free.   Evaluation: 0%  Current:  0% improvement since starting therapy. 7/23/21. No change      Long Term Goals: To be accomplished in 10 treatments:   1. Patient's pain level will be 0-2/10 with activity in order to improve patient's ability to perform normal ADLs.    Evaluation: 3-10/10 with activity  Current:  Pain range 3/10-5/10. 7/23/21. Progressing.   2. 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   Evaluation: B Rot: 25%, p!   Current: Rotation equally limited with pain 4/10 on left rotation; 2-3/10 to the right. No change. 3. Patient will increase FOTO score by 10 points to indicate increased functional mobility.   Evaluation: 45  Current:  FOTO = 57.  7/23/2021. Goal Met.  4. Patient will report >/= 75% reduction in HA symptoms in order to demonstrate a return to PLOF.   Evaluation: 0%   Current:  Patient reports no change, was up this morning with a headache.  7/23/2021.   No change.   5. Patient will demonstrate pain free right shoulder flexion and abduction AROM >/= 160 degrees in order to perform reaching/lifting tasks pain free   Evaluation: Flex: 128, Abd: 125  Current:  AROM right shoulder flexion 134 deg, abduction = 117 deg. 7/23/21. Flexion progressing; abduction regressed from evaluation.       PLAN  [x]  Upgrade activities as tolerated     [x]  Continue plan of care  []  Update interventions per flow sheet       []  Discharge due to:_  []  Other:_      Hola Pan PT 7/23/2021  7:14 AM    Future Appointments   Date Time Provider Zaheer Cho   7/23/2021  7:15 AM Artur Rd, PT [facility]   7/27/2021  7:15 AM Tanja Lints, PTA [facility]   7/30/2021  7:15 AM [address], PT [facility]   8/3/2021  7:15 AM Tanja Lints, PTA [facility]   8/6/2021  7:15 AM Artur Sultana, PT [facility]   8/10/2021  8:00 AM Ginette Alcazar NP [facility]

## 2021-07-27 ENCOUNTER — HOSPITAL ENCOUNTER (OUTPATIENT)
Dept: PHYSICAL THERAPY | Age: 61
Discharge: HOME | End: 2021-07-27
Attending: NURSE PRACTITIONER
Payer: OTHER MISCELLANEOUS

## 2021-07-27 PROCEDURE — 97110 THERAPEUTIC EXERCISES: CPT

## 2021-07-27 PROCEDURE — 97112 NEUROMUSCULAR REEDUCATION: CPT

## 2021-07-27 PROCEDURE — 97140 MANUAL THERAPY 1/> REGIONS: CPT

## 2021-07-27 NOTE — PROGRESS NOTES
PT DAILY TREATMENT NOTE     Patient Name: Luis Paulino  Date:2021  : 1960  [x]  Patient  Verified  Payor: Alisonshemar Rodriguez / Plan: [id number] / Product Type: Workers Comp /    In time:7:15  Out time:8:02  Total Treatment Time (min): 47  Visit #: 1 of 4    Treatment Area: Neck pain [M54.2]    SUBJECTIVE  Pain Level (0-10 scale): 2  Any medication changes, allergies to medications, adverse drug reactions, diagnosis change, or new procedure performed?: [x] No    [] Yes (see summary sheet for update)  Subjective functional status/changes:   [] No changes reported  Patient reports that his headaches have eased up over  weekend. OBJECTIVE       23 min Therapeutic Exercise:  [x]? ?? See flow sheet :   Rationale: increase ROM and increase strength to improve the patients ability to increase ease with cervical rotation needed for driving.            16 min Neuromuscular Re-education:  [x]? ??  See flow sheet :Emphasis on recruitment and activation of scapular muscles to improve C-T and scapulohumeral stability. Rationale: increase ROM, increase strength, improve coordination and increase proprioception  to improve the patients ability to increase ease with overhead reaching.      8 min Manual Therapy:    Supine- SOR  Supine- STM/TPR right UT/LS, manual Sidebend stretch on right.    The manual therapy interventions were performed at a separate and distinct time from the therapeutic activities interventions. Rationale: decrease pain, increase ROM, increase tissue extensibility and decrease trigger points to increase ease with ADLs.                 With   [] TE   [] TA   [] neuro   [] other: Patient Education: [x] Review HEP    [] Progressed/Changed HEP based on:   [] positioning   [] body mechanics   [] transfers   [] heat/ice application    [] other:      Other Objective/Functional Measures: 50% improvement since [facility].       Pain Level (0-10 scale) post treatment: 0    ASSESSMENT/Changes in Function: Patient continues to make limited progress toward goals. Patient will continue to benefit from skilled PT services to modify and progress therapeutic interventions, address functional mobility deficits, address ROM deficits, address strength deficits and analyze and address soft tissue restrictions to attain remaining goals. []  See Plan of Care  []  See progress note/recertification  []  See Discharge Summary         Progress towards goals / Updated goals:  Short Term Goals: To be accomplished in 2 weeks:   1. Patient will become proficient in their HEP and will be compliant in performing that program.   Last PN:  MET: pt reports performing HEP. 21  2. Patient will report >/= 50-75% reduction in right UE radicular symptoms in order to return to performing functional ADLs pain free.   Last PN:  0% improvement since starting therapy. 21. No change  Current: Not met: 0% change with reduction of right UE radicular symptoms. 21      Long Term Goals: To be accomplished in 10 treatments:   1. Patient's pain level will be 0-2/10 with activity in order to improve patient's ability to perform normal ADLs.    Last PN:   Pain range 3/10-5/10. 21. Progressing.   2. 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   Last PN: : Rotation equally limited with pain 4/10 on left rotation; 2-3/10 to the right. No change. 3. Patient will increase FOTO score by 10 points to indicate increased functional mobility.   Last PN:   FOTO = 57.  2021. Goal Met.  4. Patient will report >/= 75% reduction in HA symptoms in order to demonstrate a return to PLOF.   Last PN:  Patient reports no change, was up this morning with a headache.  2021. No change.   Current: Progressin% improvement since [facility]. 21  5.  Patient will demonstrate pain free right shoulder flexion and abduction AROM >/= 160 degrees in order to perform reaching/lifting tasks pain free   Last PN:   AROM right shoulder flexion 134 deg, abduction = 117 deg. 7/23/21.  Flexion progressing; abduction regressed from evaluation.         PLAN  []  Upgrade activities as tolerated     [x]  Continue plan of care  []  Update interventions per flow sheet       []  Discharge due to:_  []  Other:_      Denia Merino PTA 7/27/2021  7:20 AM    Future Appointments   Date Time Provider Zaheer Cho   7/30/2021  7:15 AM Chava Kamara PT [facility]   8/3/2021  7:15 AM Mendez Ardon PTA [facility]   8/6/2021  7:15 AM hCava Kamara PT [facility]   8/10/2021  8:00 AM Paris Alcazar NP [facility]

## 2021-07-30 ENCOUNTER — HOSPITAL ENCOUNTER (OUTPATIENT)
Dept: PHYSICAL THERAPY | Age: 61
Discharge: HOME | End: 2021-07-30
Attending: NURSE PRACTITIONER
Payer: OTHER MISCELLANEOUS

## 2021-07-30 PROCEDURE — 97110 THERAPEUTIC EXERCISES: CPT | Performed by: PHYSICAL THERAPIST

## 2021-07-30 PROCEDURE — 97140 MANUAL THERAPY 1/> REGIONS: CPT | Performed by: PHYSICAL THERAPIST

## 2021-07-30 PROCEDURE — 97112 NEUROMUSCULAR REEDUCATION: CPT | Performed by: PHYSICAL THERAPIST

## 2021-07-30 NOTE — PROGRESS NOTES
PT DAILY TREATMENT NOTE     Patient Name: Santi House  Date:2021  : 1960  [x]  Patient  Verified  Payor: Kerri Lunch / Plan: [address] / Product Type: Workers Comp /    In time:7:15  Out time:8:13  Total Treatment Time (min): 58  Visit #: 2 of 4    Treatment Area: Neck pain [M54.2]    SUBJECTIVE  Pain Level (0-10 scale): 5-6  Any medication changes, allergies to medications, adverse drug reactions, diagnosis change, or new procedure performed?: [x] No    [] Yes (see summary sheet for update)  Subjective functional status/changes:   [] No changes reported  Patient denies a headache this morning. He c/o pain located in the right UT. OBJECTIVE    Modality rationale: decrease pain to improve the patients ability to increase tolerance to activity.    Min Type Additional Details    [] Estim:  []Unatt       []IFC  []Premod                        []Other:  []w/ice   []w/heat  Position:  Location:    [] Estim: []Att    []TENS instruct  []NMES                    []Other:  []w/US   []w/ice   []w/heat  Position:  Location:    []  Traction: [] Cervical       []Lumbar                       [] Prone          []Supine                       []Intermittent   []Continuous Lbs:  [] before manual  [] after manual    []  Ultrasound: []Continuous   [] Pulsed                           []1MHz   []3MHz W/cm2:  Location:    []  Iontophoresis with dexamethasone         Location: [] Take home patch   [] In clinic   10 []  Ice     [x]  heat  []  Ice massage  []  Laser   []  Anodyne Position: sitting  Location: neck/shoulders    []  Laser with stim  []  Other:  Position:  Location:    []  Vasopneumatic Device    []  Right     []  Left  Pre-treatment girth:  Post-treatment girth:  Measured at (location):  Pressure:       [] lo [] med [] hi   Temperature: [] lo [] med [] hi   [] Skin assessment post-treatment:  []intact []redness- no adverse reaction    []redness - adverse error     reaction:         23 min Therapeutic Exercise:  [] See flow sheet :   Rationale: increase ROM and increase strength to improve the patients ability to increase ease with cervical rotation needed for driving. 16 min Neuromuscular Re-education:  []  See flow sheet :Emphasis on recruitment and activation of scapular muscles to improve C-T and scapulohumeral stability   Rationale: increase strength, improve coordination and increase proprioception  to improve the patients ability to increase ease with overhead reaching. 9 min Manual Therapy:  Gentle manual traction; SOR; gentle stretching rotation and SB. TPR right UT. The manual therapy interventions were performed at a separate and distinct time from the therapeutic activities interventions. Rationale: decrease pain, increase ROM, increase tissue extensibility and decrease trigger points to increase ease of motion to improve function. With   [] TE   [] TA   [] neuro   [] other: Patient Education: [x] Review HEP    [] Progressed/Changed HEP based on:   [] positioning   [] body mechanics   [] transfers   [] heat/ice application    [] other:      Other Objective/Functional Measures: Tenderness along the right UT and right lateral C/S with trigger points. Pain Level (0-10 scale) post treatment: 5    ASSESSMENT/Changes in Function: Patient with increased right sided neck and Upper Trapezius pain but with decreased subjective report of headaches. Patient will continue to benefit from skilled PT services to modify and progress therapeutic interventions, address functional mobility deficits, address ROM deficits, address strength deficits and analyze and address soft tissue restrictions to attain remaining goals.      [x]  See Plan of Care  []  See progress note/recertification  []  See Discharge Summary         Progress towards goals / Updated goals:  Short Term Goals: To be accomplished in 2 weeks:   1. Patient will become proficient in their HEP and will be compliant in performing that program.   Evaluation: HEP established.   Current: MET: pt reports performing HEP. 7/13/21  2. Patient will report >/= 50-75% reduction in right UE radicular symptoms in order to return to performing functional ADLs pain free.   Evaluation: 0%  Current:  0% improvement since starting therapy. 7/23/21. No change      Long Term Goals: To be accomplished in 10 treatments:   1. Patient's pain level will be 0-2/10 with activity in order to improve patient's ability to perform normal ADLs.    Evaluation: 3-10/10 with activity  Current:  Pain range 3/10-5/10. 7/23/21. Progressing.   2. 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   Evaluation: B Rot: 25%, p!   Current: Rotation equally limited with pain 4/10 on left rotation; 2-3/10 to the right. No change. 3. Patient will increase FOTO score by 10 points to indicate increased functional mobility.   Evaluation: 45  Current:  FOTO = 57.  7/23/2021. Goal Met.  4. Patient will report >/= 75% reduction in HA symptoms in order to demonstrate a return to PLOF.   Evaluation: 0%   Current:  Patient reports 50-60% reduction in his headaches. 7/30/2021. Progressing. 5. Patient will demonstrate pain free right shoulder flexion and abduction AROM >/= 160 degrees in order to perform reaching/lifting tasks pain free   Evaluation: Flex: 128, Abd: 125  Current:  AROM right shoulder flexion 134 deg, abduction = 117 deg. 7/23/21. Flexion progressing; abduction regressed from evaluation.       PLAN  [x]  Upgrade activities as tolerated     [x]  Continue plan of care  []  Update interventions per flow sheet       []  Discharge due to:_  []  Other:_      Traci Enriquez, PT 7/30/2021  7:22 AM    Future Appointments   Date Time Provider Zaheer Cho   8/3/2021  7:15 AM Serge Lemos PTA [facility]   8/6/2021  7:15 AM Barbara Costello, PT [facility] 8/10/2021  8:00 AM Kaz Alcazar NP [facility]

## 2021-08-03 ENCOUNTER — HOSPITAL ENCOUNTER (OUTPATIENT)
Dept: PHYSICAL THERAPY | Age: 61
Discharge: HOME | End: 2021-08-03
Attending: NURSE PRACTITIONER
Payer: OTHER MISCELLANEOUS

## 2021-08-03 PROCEDURE — 97112 NEUROMUSCULAR REEDUCATION: CPT

## 2021-08-03 PROCEDURE — 97110 THERAPEUTIC EXERCISES: CPT

## 2021-08-03 NOTE — PROGRESS NOTES
PT DAILY TREATMENT NOTE     Patient Name: Santi House  Date:8/3/2021  : 1960  [x]  Patient  Verified  Payor: Kerri Lunch / Plan: [id number] / Product Type: Workers Comp /    In time:7:15  Out time:7:56  Total Treatment Time (min): 41  Visit #: 3 of 4      Treatment Area: Neck pain [M54.2]    SUBJECTIVE  Pain Level (0-10 scale): 2  Any medication changes, allergies to medications, adverse drug reactions, diagnosis change, or new procedure performed?: [x] No    [] Yes (see summary sheet for update)  Subjective functional status/changes:   [] No changes reported  Patient states his headaches do not occur as frequently since starting therapy. OBJECTIVE       23 min Therapeutic Exercise:  []? See flow sheet :   Rationale: increase ROM and increase strength to improve the patients ability to increase ease with cervical rotation needed for driving.     18 min Neuromuscular Re-education:  []? See flow sheet :Emphasis on recruitment and activation of scapular muscles to improve C-T and scapulohumeral stability   Rationale: increase strength, improve coordination and increase proprioception  to improve the patients ability to increase ease with overhead reaching.           With   [] TE   [] TA   [] neuro   [] other: Patient Education: [x] Review HEP    [] Progressed/Changed HEP based on:   [] positioning   [] body mechanics   [] transfers   [] heat/ice application    [] other:      Other Objective/Functional Measures: right shoulder AROM flexion 125 deg, abduction 100 deg     Pain Level (0-10 scale) post treatment: 2    ASSESSMENT/Changes in Function: Patient has made no improvement with right shoulder AROM since start of therapy and continues to have radicular symptoms down right UE.      Patient will continue to benefit from skilled PT services to modify and progress therapeutic interventions, address functional mobility deficits, address ROM deficits, address strength deficits and analyze and address soft tissue restrictions to attain remaining goals. []  See Plan of Care  []  See progress note/recertification  []  See Discharge Summary         Progress towards goals / Updated goals:  Short Term Goals: To be accomplished in 2 weeks:   1. Patient will become proficient in their HEP and will be compliant in performing that program.   Evaluation: HEP established.   Current: MET: pt reports performing HEP. 7/13/21  2. Patient will report >/= 50-75% reduction in right UE radicular symptoms in order to return to performing functional ADLs pain free.   Evaluation: 0%  Current:  0% improvement since starting therapy. 7/23/21. No change      Long Term Goals: To be accomplished in 10 treatments:   1. Patient's pain level will be 0-2/10 with activity in order to improve patient's ability to perform normal ADLs.    Evaluation: 3-10/10 with activity  Current:  Progressing. Pain range 2/10-5/10. 8/3/21.    2. 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   Evaluation: B Rot: 25%, p!   Current: Rotation equally limited with pain 4/10 on left rotation; 2-3/10 to the right.  No change. 3. Patient will increase FOTO score by 10 points to indicate increased functional mobility.   Evaluation: 45  Current:  FOTO = 57.  7/23/2021.  Goal Met.  4. Patient will report >/= 75% reduction in HA symptoms in order to demonstrate a return to PLOF.   Evaluation: 0%   Current:  Progressing. Patient reports 50-60% reduction in his headaches. 8/3/2021.   5. Patient will demonstrate pain free right shoulder flexion and abduction AROM >/= 160 degrees in order to perform reaching/lifting tasks pain free   Evaluation: Flex: 128, Abd: 125  Current:  regressed: right shoulder AROM flexion 125 deg, abduction 100 deg.  8/3/21     PLAN  []  Upgrade activities as tolerated     [x]  Continue plan of care  []  Update interventions per flow sheet       []  Discharge due to:_  []  Other:_      Denia Merino, OPHELIA 8/3/2021  7:18 AM    Future Appointments   Date Time Provider Zaheer Cho   8/6/2021  7:15 AM Chava Kamara, PT [facility]   8/10/2021  8:00 AM Paris Alcazar NP [facility]

## 2021-08-06 ENCOUNTER — HOSPITAL ENCOUNTER (OUTPATIENT)
Dept: PHYSICAL THERAPY | Age: 61
Discharge: HOME | End: 2021-08-06
Attending: NURSE PRACTITIONER
Payer: OTHER MISCELLANEOUS

## 2021-08-06 PROCEDURE — 97110 THERAPEUTIC EXERCISES: CPT | Performed by: PHYSICAL THERAPIST

## 2021-08-06 PROCEDURE — 97112 NEUROMUSCULAR REEDUCATION: CPT | Performed by: PHYSICAL THERAPIST

## 2021-08-06 PROCEDURE — 97140 MANUAL THERAPY 1/> REGIONS: CPT | Performed by: PHYSICAL THERAPIST

## 2021-08-06 NOTE — PROGRESS NOTES
[facility] - [facility]              [address]        [place], [address]   [phone] fax    Discharge Summary  Patient name: Alexander Rodríguez Start of Care: 2021   Referral source: Mariposa Banegas NP : 1960   Medical/Treatment Diagnosis: Neck pain [M54.2]  Payor: [facility] / Plan: [address] [place] [address] / Product Type: Workers Comp /  Onset Date:; Symptoms flare up 2021. Prior Hospitalization: see medical history Provider#: [id number]   Medications: Verified on Patient Summary List    Comorbidities: Pt reports: Arthritis, C3-C4 Fusion, L4-L5 Fusion   Prior Level of Function: Ind w/ ADL/IADLs, self care tasks and turning head             Visits from Start of Care: 11    Missed Visits: 0  Reporting Period : 2021 to 2021    Summary of Care:  Short Term Goals: To be accomplished in 2 weeks:   1. Patient will become proficient in their HEP and will be compliant in performing that program.   Evaluation: HEP established.   Current:  Pt reports performing HEP. 21  Goal Met  2. Patient will report >/= 50-75% reduction in right UE radicular symptoms in order to return to performing functional ADLs pain free.   Evaluation: 0%  Current:  0% improvement since starting therapy. 21. No change      Long Term Goals: To be accomplished in 10 treatments:   1. Patient's pain level will be 0-2/10 with activity in order to improve patient's ability to perform normal ADLs.    Evaluation: 3-10/10 with activity  Current:   Pain range 2/10-5/10. 8/3/21.    Progressing. 2. 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   Evaluation: B Rot: 25%, p!   Current: Rotation equally limited with pain 4/10 on left rotation; 2-3/10 to the right. 2021.   No change.     3. Patient will increase FOTO score by 10 points to indicate increased functional mobility.   Evaluation: 45  Current:  FOTO = 57.  7/23/2021. Goal Met.  4. Patient will report >/= 75% reduction in HA symptoms in order to demonstrate a return to PLOF.   Evaluation: 0%   Current:  Patient reports 50-60% reduction in his headaches.  8/3/2021.    Progressing, not met. 5. Patient will demonstrate pain free right shoulder flexion and abduction AROM >/= 160 degrees in order to perform reaching/lifting tasks pain free   Evaluation: Flex: 128, Abd: 125  Current:   Right shoulder AROM flexion 125 deg, abduction 100 deg. 8/3/21   Regressed but his AROM has varied during his therapy, sometimes better than on other days.     ASSESSMENT/RECOMMENDATIONS:  [x]Discontinue therapy: []Patient has reached or is progressing toward set goals      []Patient is non-compliant or has abdicated      [x]Due to lack of appreciable progress towards set goals    Winston Campo, PT 8/6/2021 8:54 AM

## 2021-08-06 NOTE — PROGRESS NOTES
PT DAILY TREATMENT NOTE     Patient Name: Karlee Montalvo  Date:2021  : 1960  [x]  Patient  Verified  Payor: Dmitriy Bedolla / Plan: [address] / Product Type: Workers Comp /    In [address] [place] [address]  Total Treatment Time (min): 51  Visit #: 4 of 4    Treatment Area: Neck pain [M54.2]    SUBJECTIVE  Pain Level (0-10 scale): 4  Any medication changes, allergies to medications, adverse drug reactions, diagnosis change, or new procedure performed?: [x] No    [] Yes (see summary sheet for update)  Subjective functional status/changes:   [] No changes reported  Patient reports he remains stiff, especially in the morning. He did note it is not as stiff as it was before he was in therapy. He continues to note right UE radicular symptoms which have not improved with therapy. He continues to note pain in the neck and UT/shoulder area. OBJECTIVE    Modality rationale: decrease inflammation and decrease pain to improve the patients ability to increase tolerance to activity.    Min Type Additional Details    [] Estim:  []Unatt       []IFC  []Premod                        []Other:  []w/ice   []w/heat  Position:  Location:    [] Estim: []Att    []TENS instruct  []NMES                    []Other:  []w/US   []w/ice   []w/heat  Position:  Location:    []  Traction: [] Cervical       []Lumbar                       [] Prone          []Supine                       []Intermittent   []Continuous Lbs:  [] before manual  [] after manual    []  Ultrasound: []Continuous   [] Pulsed                           []1MHz   []3MHz W/cm2:  Location:    []  Iontophoresis with dexamethasone         Location: [] Take home patch   [] In clinic   10 []  Ice     [x]  heat  []  Ice massage  []  Laser   []  Anodyne Position: sitting  Location:neck    []  Laser with stim  []  Other:  Position:  Location:    []  Vasopneumatic Device    []  Right     []  Left  Pre-treatment girth:  Post-treatment girth:  Measured at (location):  Pressure:       [] lo [] med [] hi   Temperature: [] lo [] med [] hi   [] Skin assessment post-treatment:  []intact []redness- no adverse reaction    []redness - adverse reaction:       23 min Therapeutic Exercise:  [] See flow sheet :   Rationale: increase ROM and increase strength to improve the patients ability to increase ease with cervical rotation needed for driving. 10 min Neuromuscular Re-education:  []  See flow sheet :Emphasis on recruitment and activation of scapular muscles to improve C-T and scapulohumeral stability   Rationale: increase strength, improve coordination and increase proprioception  to improve the patients ability to increase ease with overhead reaching. 8 min Manual Therapy:  Gentle LAD, TPR right UT, manual stretching. The manual therapy interventions were performed at a separate and distinct time from the therapeutic activities interventions. Rationale: decrease pain, increase ROM, increase tissue extensibility and decrease trigger points to increase ease of motion to improve function. With   [] TE   [] TA   [] neuro   [] other: Patient Education: [x] Review HEP    [] Progressed/Changed HEP based on:   [] positioning   [] body mechanics   [] transfers   [] heat/ice application    [] other:      Other Objective/Functional Measures: Tenderness to palpation right UT. Active guarding with C/S PROM. Springy end feel to rotation. Pain at end range all motions. Pain Level (0-10 scale) post treatment: 2    ASSESSMENT/Changes in Function: Patient with continued pain and radicular symptoms. Overall, no significant changes noted subjectively by this patient.     [x]  See Discharge Summary         Progress towards goals / Updated goals:  Short Term Goals: To be accomplished in 2 weeks:   1. Patient will become proficient in their HEP and will be compliant in performing that program.   Evaluation: HEP established.   Current: MET: pt reports performing HEP. 7/13/21  2. Patient will report >/= 50-75% reduction in right UE radicular symptoms in order to return to performing functional ADLs pain free.   Evaluation: 0%  Current:  0% improvement since starting therapy. 8/6//21. No change      Long Term Goals: To be accomplished in 10 treatments:   1. Patient's pain level will be 0-2/10 with activity in order to improve patient's ability to perform normal ADLs.    Evaluation: 3-10/10 with activity  Current:  Progressing. Pain range 2/10-5/10. 8/3/21.    2. 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   Evaluation: B Rot: 25%, p!   Current: Rotation equally limited with pain 4/10 on left rotation; 2-3/10 to the right.  No change. 8/6/2021  3. Patient will increase FOTO score by 10 points to indicate increased functional mobility.   Evaluation: 45  Current:  FOTO = 57.  7/23/2021.  Goal Met.  4. Patient will report >/= 75% reduction in HA symptoms in order to demonstrate a return to PLOF.   Evaluation: 0%   Current:  Progressing. Patient reports 50-60% reduction in his headaches.  8/3/2021.   5. Patient will demonstrate pain free right shoulder flexion and abduction AROM >/= 160 degrees in order to perform reaching/lifting tasks pain free   Evaluation: Flex: 128, Abd: 125  Current:  regressed: right shoulder AROM flexion 125 deg, abduction 100 deg. 8/3/21      PLAN  Discharge back to his provider for further evaluation.      Joseline Sharif PT 8/6/2021  7:09 AM    Future Appointments   Date Time Provider Zaheer Cho   8/6/2021  7:15 AM Arcadio Mercado, PT [facility] [id number]   8/10/2021  8:00 AM Taras Alcazar NP [facility]

## 2021-08-09 NOTE — PROGRESS NOTES
Chief complaint   Chief Complaint   Patient presents with    Neck Pain       History of Present Illness:  Ze Shelton is a  63 y.o.  male  comes in today for follow-up of his neck pain with cervicogenic headaches. Last visit we changed his baclofen to Flexeril. Renate Cho He states it seems to help better than the other muscle relaxers. There are days he will take it twice a day but mostly takes it at nighttime. We also put him in physical therapy with dry needling. He states he did do the physical therapy but they did not have enough people there to do the dry needling. Review of the PT note states he was discharged due to lack of appreciable's. He does feel like it is helped with his cervicogenic headaches and he continues to do home exercise program. He continues to get posterior neck pain especially on the right in the trapezius area closer to his neck. When he turns his head to the right the pain is increased. He also has a right shoulder issue that is a worker comp issue and sees Dr. Loreto Shaikh. He was scheduled for surgery for that but postponed it due to his increasing neck pain. He has called the surgeon to reschedule the shoulder surgery but he has not heard back from them yet. He is also tried Mobic, ibuprofen and naproxen in the past which did not help. He denies fever bowel bladder dysfunction. He denies any other new medical issues.        Physical Exam: The patient is a 19-year-old male well-developed well-nourished who is alert and oriented with a normal mood and affect. He has a full weightbearing nonantalgic gait. He did not use any assistive device. He has 4 out of 5 strength of his right deltoid and tricep likely due to his shoulder issue. He has 5 out of 5 strength of his left upper extremity. Negative Yuliya's.        Assessment and Plan: This is a patient who has cervicogenic headaches (improved with PT) and cervical spondylosis.     He will continue his home exercise program. I have refilled his Flexeril. He will use his TENS unit on his neck to see if that will help with his pain. We will see him back in 6 months sooner if needed. Medications:  Current Outpatient Medications   Medication Sig Dispense Refill    cyclobenzaprine (FLEXERIL) 10 mg tablet Take 1 Tablet by mouth two (2) times a day. 60 Tablet 1    pravastatin (PRAVACHOL) 40 mg tablet Take 1 Tab by mouth nightly. 90 Tab 3    aspirin-acetaminophen-caffeine (Excedrin Migraine) 250-250-65 mg per tablet Take 1 Tab by mouth.  ascorbic acid, vitamin C, (VITAMIN C) 1,000 mg tablet Take 1,000 mg by mouth daily.  vitamin E (AQUA GEMS) 400 unit capsule Take 400 Units by mouth daily.  melatonin 5 mg cap capsule Take 5 mg by mouth nightly.  ASPIRIN/ACETAMINOPHEN/CAFFEINE (EXCEDRIN MIGRAINE PO) Take  by mouth three (3) times daily as needed. Review of systems:    Past Medical History:   Diagnosis Date    Generalized osteoarthrosis, involving multiple sites     Headache(784.0) April 2011    Low back pain     Lumbar postlaminectomy syndrome     Mixed hyperlipidemia     Neck pain     with headaches    Right arm pain April 2011     Past Surgical History:   Procedure Laterality Date    HX CERVICAL FUSION      C3-C4    HX COLONOSCOPY  12/2012; 1/2018    polyps, diverticula; 2nd colo neg    HX LUMBAR FUSION      L4-L5 with instrumentation    HX SHOULDER ARTHROSCOPY Right 01/2017    HX TONSIL AND ADENOIDECTOMY       Social History     Socioeconomic History    Marital status:      Spouse name: Not on file    Number of children: Not on file    Years of education: Not on file    Highest education level: Not on file   Occupational History    Not on file   Tobacco Use    Smoking status: Never Smoker    Smokeless tobacco: Former User   Substance and Sexual Activity    Alcohol use:  Yes     Alcohol/week: 0.0 standard drinks    Drug use: No    Sexual activity: Not on file   Other Topics Concern     Service Not Asked    Blood Transfusions Not Asked    Caffeine Concern Not Asked    Occupational Exposure Not Asked    Hobby Hazards Not Asked    Sleep Concern Not Asked    Stress Concern Not Asked    Weight Concern Not Asked    Special Diet Not Asked    Back Care Not Asked    Exercise Not Asked    Bike Helmet Not Asked   [address] Not Asked    Self-Exams Not Asked   Social History Narrative    Not on file     Social Determinants of Health     Financial Resource Strain:     Difficulty of Paying Living Expenses:    Food Insecurity:     Worried About Running Out of Food in the Last Year:     [address] in the Last Year:    Transportation Needs:     Lack of Transportation (Medical):  Lack of Transportation (Non-Medical):    Physical Activity:     Days of Exercise per Week:     Minutes of Exercise per Session:    Stress:     Feeling of Stress :    Social Connections:     Frequency of Communication with Friends and Family:     Frequency of Social Gatherings with Friends and Family:     Attends Buddhist Services:     Active Member of Clubs or Organizations:     Attends Club or Organization Meetings:     Marital Status:    Intimate Partner Violence:     Fear of Current or Ex-Partner:     Emotionally Abused:     Physically Abused:     Sexually Abused:      Family History   Problem Relation Age of Onset    Cancer Sister     Diabetes Neg Hx     Heart Disease Neg Hx     Hypertension Neg Hx     Stroke Neg Hx        Physical Exam:  Visit Vitals  Pulse 78   Temp 97.2 °F (36.2 °C) (Skin)   Ht 6' 1\" (1.854 m)   Wt 200 lb (90.7 kg)   SpO2 98%   BMI 26.39 kg/m²     Pain Scale: 3/10       has been . reviewed and is appropriate          Diagnoses and all orders for this visit:    1. Cervicogenic headache    2. Muscle spasm  -     cyclobenzaprine (FLEXERIL) 10 mg tablet; Take 1 Tablet by mouth two (2) times a day.     3. Cervical spondylosis            Follow-up and Dispositions    · Return in about 6 months (around 2/10/2022) for with STARLA Alcazar.              We have informed Mj Dominga to notify us for immediate appointment if he has any worsening neurogical symptoms or if an emergency situation presents, then call [phone]

## 2021-08-10 ENCOUNTER — OFFICE VISIT (OUTPATIENT)
Dept: ORTHOPEDIC SURGERY | Age: 61
End: 2021-08-10
Payer: OTHER MISCELLANEOUS

## 2021-08-10 VITALS
OXYGEN SATURATION: 98 % | HEIGHT: 73 IN | HEART RATE: 78 BPM | BODY MASS INDEX: 26.51 KG/M2 | WEIGHT: 200 LBS | TEMPERATURE: 97.2 F

## 2021-08-10 DIAGNOSIS — G44.86 CERVICOGENIC HEADACHE: Primary | ICD-10-CM

## 2021-08-10 DIAGNOSIS — M62.838 MUSCLE SPASM: ICD-10-CM

## 2021-08-10 DIAGNOSIS — M47.812 CERVICAL SPONDYLOSIS: ICD-10-CM

## 2021-08-10 PROCEDURE — 99213 OFFICE O/P EST LOW 20 MIN: CPT | Performed by: NURSE PRACTITIONER

## 2021-08-10 RX ORDER — CYCLOBENZAPRINE HCL 10 MG
10 TABLET ORAL 2 TIMES DAILY
Qty: 60 TABLET | Refills: 1 | Status: SHIPPED | OUTPATIENT
Start: 2021-08-10 | End: 2021-10-17

## 2021-09-23 ENCOUNTER — OFFICE VISIT (OUTPATIENT)
Dept: ORTHOPEDIC SURGERY | Age: 61
End: 2021-09-23
Payer: OTHER MISCELLANEOUS

## 2021-09-23 VITALS
OXYGEN SATURATION: 96 % | HEART RATE: 98 BPM | WEIGHT: 202 LBS | TEMPERATURE: 97.5 F | BODY MASS INDEX: 26.77 KG/M2 | HEIGHT: 73 IN

## 2021-09-23 DIAGNOSIS — Z98.1 HISTORY OF LUMBAR SPINAL FUSION: ICD-10-CM

## 2021-09-23 DIAGNOSIS — M96.1 LUMBAR POST-LAMINECTOMY SYNDROME: ICD-10-CM

## 2021-09-23 DIAGNOSIS — M54.59 MECHANICAL LOW BACK PAIN: Primary | ICD-10-CM

## 2021-09-23 DIAGNOSIS — Z98.1 HX OF FUSION OF CERVICAL SPINE: ICD-10-CM

## 2021-09-23 PROCEDURE — 99213 OFFICE O/P EST LOW 20 MIN: CPT | Performed by: PHYSICAL MEDICINE & REHABILITATION

## 2021-09-23 PROCEDURE — 72110 X-RAY EXAM L-2 SPINE 4/>VWS: CPT | Performed by: PHYSICAL MEDICINE & REHABILITATION

## 2021-09-23 NOTE — PROGRESS NOTES
Sun Tinoco presents today for   Chief Complaint   Patient presents with    Back Pain       Is someone accompanying this pt? no    Is the patient using any DME equipment during OV? no    Depression Screening:  3 most recent PHQ Screens 5/11/2021   Little interest or pleasure in doing things Not at all   Feeling down, depressed, irritable, or hopeless Not at all   Total Score PHQ 2 0       Learning Assessment:  Learning Assessment 9/11/2015   PRIMARY LEARNER Patient   HIGHEST LEVEL OF EDUCATION - PRIMARY LEARNER  GRADUATED HIGH SCHOOL OR GED   BARRIERS PRIMARY LEARNER NONE   CO-LEARNER CAREGIVER No   PRIMARY LANGUAGE ENGLISH   LEARNER PREFERENCE PRIMARY OTHER (COMMENT)   ANSWERED BY patient   RELATIONSHIP SELF       Abuse Screening:  Abuse Screening Questionnaire 5/11/2021   Do you ever feel afraid of your partner? N   Are you in a relationship with someone who physically or mentally threatens you? N   Is it safe for you to go home? Y       Fall Risk  Fall Risk Assessment, last 12 mths 5/11/2021   Able to walk? Yes   Fall in past 12 months? 0   Do you feel unsteady? 0   Are you worried about falling 0       Coordination of Care:  1. Have you been to the ER, urgent care clinic since your last visit? no  Hospitalized since your last visit? no    2. Have you seen or consulted any other health care providers outside of the [address] [place] [address] since your last visit? no Include any pap smears or colon screening.  no

## 2021-09-23 NOTE — Clinical Note
9/24/2021    Patient: Sun Tinoco   YOB: 1960   Date of Visit: 9/23/2021     Brenda Carrasco MD  Via     Dear Brenda Carrasco MD,      Thank you for referring Mr. Loi Subramanian to [facility] for evaluation. My notes for this consultation are attached. If you have questions, please do not hesitate to call me. I look forward to following your patient along with you.       Sincerely,    Beatrice Diez MD

## 2021-09-23 NOTE — PROGRESS NOTES
[address], [address]  [place], [address] [place] [address] Street  Phone: [phone]  Fax: [phone]        Chris Christensen  : 1960  PCP: Hector Ramos MD    PROGRESS NOTE      ASSESSMENT AND PLAN    Diagnoses and all orders for this visit:    1. Mechanical low back pain  -     REFERRAL TO SPINE SURGERY    2. History of lumbar spinal fusion  -     REFERRAL TO SPINE SURGERY    3. Hx of fusion of cervical spine  -     AMB POC XRAY, SPINE, LUMBOSACRAL; 4+    4. Lumbar post-laminectomy syndrome, LLE residuals  -     REFERRAL TO SPINE SURGERY        1. Abel Anderson is a 63 y.o. male fused L3 to the sacrum, history removal of hardware for painful hardware. He reports worsening mechanical pain, there is mild haloing right S1 and bilateral L3. Neurologically stable. 2. Advised to continue HEP as tolerated. Fall precautions  3. Referral to spine surgery for evaluation of hardware. 4. Continue OTC medication. HISTORY OF PRESENT ILLNESS      Abel Anderson is a 63 y.o. male presents for follow up of neck and back pain. Patient has been in PT    He reports a progressively worsening back pain. He also has intermittent left-sided sciatic pain. His pain is exacerbated with cutting grass and weeding. He mentions numbness in right digits 2-5. He takes Flexeril QHS with benefit. Patient has received some benefit form PT but did not receive dry needling. He feels unable to bend or twist due to severe sharp low back pain. This feels similar to when he last needed removal of hardware. Avoids medications, takes Excedrin Migraine if needed with temporary mild benefit    Denies red flags including persistent fevers, chills, weight changes, neurogenic bowel or bladder symptoms.     Pain Assessment  2021   Location of Pain Back   Pain Location Comment -   Location Modifiers -   Severity of Pain 4   Quality of Pain Sharp   Quality of Pain Comment -   Duration of Pain Persistent   Duration of Pain Comment -   Frequency of Pain Constant   Aggravating Factors Other (Comment)   Aggravating Factors Comment do a lot, weed eater   Limiting Behavior Yes   Relieving Factors Nothing   Relieving Factors Comment -   Result of Injury -   Work-Related Injury -         Treatments patient has tried:  Physical therapy:Yes, 8/2020 with some benefit  Doing HEP: Unknown  Beneficial medications: TENS unit   Failed medications: Gabitril, Gabapentin, Topamax, Lyrica, Cymbalta, Amitriptyline. MDP helped temporarily, Flector patch no benefit, Baclofen , Zanaflex, Mobic  Spinal injections: Unknown     Spinal surgery- Yes. ACDF C4-5 2003; Reports total of 5 lumbar sx, chronic LLE residuals    L XR AP/lat/flex/ext 4V 9/23/2021 (I personally reviewed these images): Hardware from L3 to sacrum, mild haloing right L5 and B/L L3, retrolisthesis L2-3   Last C MRI 2019: mild stenosis C5-6, C6-7  Last C MRI 7/2020: Intact fusion at C4-5, deg changes C6-7      RHD. Pt covered by [facility] 2003 lifting as . WRI LB 1997, 2003 Pt denies hx of CTS. 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 PHYSICAL EXAMINATION    Visit Vitals  Pulse 98   Temp 97.5 °F (36.4 °C) (Skin)   Ht 6' 1\" (1.854 m)   Wt 202 lb (91.6 kg)   SpO2 96% Comment: RA   BMI 26.65 kg/m²       Trigger points B/L upper traps  Increased muscle tone lumbar   Mild difficulty with tandem gait   Negative Vicente's and Tinel's  Intrinsic weakness on right   SLR negative  Mild Df on left 4/5, otherwise intact LE strength      Mr. Patricio Vazquez may have a reminder for a \"due or due soon\" health maintenance. I have asked that he contact his primary care provider for follow-up on this health maintenance. This note was created using Dragon transcription software, unintended errors may be present. Written by Fred Woodson, as dictated by Antonia Felton MD.  I, Dr. Antonia Felton MD, confirm that all documentation is accurate.

## 2021-10-16 DIAGNOSIS — M62.838 MUSCLE SPASM: ICD-10-CM

## 2021-10-17 RX ORDER — CYCLOBENZAPRINE HCL 10 MG
TABLET ORAL
Qty: 60 TABLET | Refills: 0 | Status: SHIPPED | OUTPATIENT
Start: 2021-10-17 | End: 2021-11-15 | Stop reason: SDUPTHER

## 2021-11-15 DIAGNOSIS — M62.838 MUSCLE SPASM: ICD-10-CM

## 2021-11-15 NOTE — TELEPHONE ENCOUNTER
Last Visit: 9/23/21 with MD Walt Gonzalez  Next Appointment: 2/10/22 with STARLA Alcazar  Previous Refill Encounter(s): 10/14/21 #[id number]    Requested Prescriptions     Pending Prescriptions Disp Refills    cyclobenzaprine (FLEXERIL) 10 mg tablet 60 Tablet 0     Sig: Take 1 Tablet by mouth two (2) times a day.

## 2021-11-16 ENCOUNTER — OFFICE VISIT (OUTPATIENT)
Dept: FAMILY MEDICINE CLINIC | Age: 61
End: 2021-11-16
Payer: MEDICARE

## 2021-11-16 ENCOUNTER — HOSPITAL ENCOUNTER (OUTPATIENT)
Dept: LAB | Age: 61
Discharge: HOME | End: 2021-11-16
Payer: COMMERCIAL

## 2021-11-16 VITALS
HEART RATE: 92 BPM | TEMPERATURE: 98.3 F | OXYGEN SATURATION: 98 % | DIASTOLIC BLOOD PRESSURE: 84 MMHG | HEIGHT: 73 IN | WEIGHT: 204 LBS | SYSTOLIC BLOOD PRESSURE: 126 MMHG | BODY MASS INDEX: 27.04 KG/M2 | RESPIRATION RATE: 16 BRPM

## 2021-11-16 DIAGNOSIS — R51.9 FREQUENT HEADACHES: Primary | ICD-10-CM

## 2021-11-16 DIAGNOSIS — R73.02 IGT (IMPAIRED GLUCOSE TOLERANCE): ICD-10-CM

## 2021-11-16 DIAGNOSIS — R51.9 FREQUENT HEADACHES: ICD-10-CM

## 2021-11-16 DIAGNOSIS — E78.2 MIXED HYPERLIPIDEMIA: ICD-10-CM

## 2021-11-16 DIAGNOSIS — M54.2 CERVICAL PAIN (NECK): ICD-10-CM

## 2021-11-16 LAB
ALBUMIN SERPL-MCNC: 3.7 G/DL (ref 3.4–5)
ALBUMIN/GLOB SERPL: 1.2 {RATIO} (ref 0.8–1.7)
ALP SERPL-CCNC: 91 U/L (ref 45–117)
ALT SERPL-CCNC: 41 U/L (ref 16–61)
ANION GAP SERPL CALC-SCNC: 7 MMOL/L (ref 3–18)
AST SERPL-CCNC: 22 U/L (ref 10–38)
BASOPHILS # BLD: 0.1 K/UL (ref 0–0.1)
BASOPHILS NFR BLD: 1 % (ref 0–2)
BILIRUB SERPL-MCNC: 0.5 MG/DL (ref 0.2–1)
BUN SERPL-MCNC: 14 MG/DL (ref 7–18)
BUN/CREAT SERPL: 15 (ref 12–20)
CALCIUM SERPL-MCNC: 9.6 MG/DL (ref 8.5–10.1)
CHLORIDE SERPL-SCNC: 105 MMOL/L (ref 100–111)
CHOLEST SERPL-MCNC: 160 MG/DL
CO2 SERPL-SCNC: 25 MMOL/L (ref 21–32)
CREAT SERPL-MCNC: 0.94 MG/DL (ref 0.6–1.3)
DIFFERENTIAL METHOD BLD: NORMAL
EOSINOPHIL # BLD: 0.1 K/UL (ref 0–0.4)
EOSINOPHIL NFR BLD: 1 % (ref 0–5)
ERYTHROCYTE [DISTWIDTH] IN BLOOD BY AUTOMATED COUNT: 12.2 % (ref 11.6–14.5)
EST. AVERAGE GLUCOSE BLD GHB EST-MCNC: 117 MG/DL
GLOBULIN SER CALC-MCNC: 3.2 G/DL (ref 2–4)
GLUCOSE SERPL-MCNC: 100 MG/DL (ref 74–99)
HBA1C MFR BLD: 5.7 % (ref 4.2–5.6)
HCT VFR BLD AUTO: 46.9 % (ref 36–48)
HDLC SERPL-MCNC: 50 MG/DL (ref 40–60)
HDLC SERPL: 3.2 {RATIO} (ref 0–5)
HGB BLD-MCNC: 15.5 G/DL (ref 13–16)
IMM GRANULOCYTES # BLD AUTO: 0 K/UL (ref 0–0.04)
IMM GRANULOCYTES NFR BLD AUTO: 0 % (ref 0–0.5)
LDLC SERPL CALC-MCNC: 93.8 MG/DL (ref 0–100)
LIPID PROFILE,FLP: NORMAL
LYMPHOCYTES # BLD: 2.7 K/UL (ref 0.9–3.6)
LYMPHOCYTES NFR BLD: 27 % (ref 21–52)
MCH RBC QN AUTO: 29.9 PG (ref 24–34)
MCHC RBC AUTO-ENTMCNC: 33 G/DL (ref 31–37)
MCV RBC AUTO: 90.5 FL (ref 78–100)
MONOCYTES # BLD: 0.9 K/UL (ref 0.05–1.2)
MONOCYTES NFR BLD: 9 % (ref 3–10)
NEUTS SEG # BLD: 6.3 K/UL (ref 1.8–8)
NEUTS SEG NFR BLD: 62 % (ref 40–73)
NRBC # BLD: 0 K/UL (ref 0–0.01)
NRBC BLD-RTO: 0 PER 100 WBC
PLATELET # BLD AUTO: 322 K/UL (ref 135–420)
PMV BLD AUTO: 9.2 FL (ref 9.2–11.8)
POTASSIUM SERPL-SCNC: 4.5 MMOL/L (ref 3.5–5.5)
PROT SERPL-MCNC: 6.9 G/DL (ref 6.4–8.2)
RBC # BLD AUTO: 5.18 M/UL (ref 4.35–5.65)
SODIUM SERPL-SCNC: 137 MMOL/L (ref 136–145)
TRIGL SERPL-MCNC: 81 MG/DL (ref ?–150)
VLDLC SERPL CALC-MCNC: 16.2 MG/DL
WBC # BLD AUTO: 10.2 K/UL (ref 4.6–13.2)

## 2021-11-16 PROCEDURE — 80061 LIPID PANEL: CPT

## 2021-11-16 PROCEDURE — 99204 OFFICE O/P NEW MOD 45 MIN: CPT | Performed by: STUDENT IN AN ORGANIZED HEALTH CARE EDUCATION/TRAINING PROGRAM

## 2021-11-16 PROCEDURE — 36415 COLL VENOUS BLD VENIPUNCTURE: CPT

## 2021-11-16 PROCEDURE — 80053 COMPREHEN METABOLIC PANEL: CPT

## 2021-11-16 PROCEDURE — 85025 COMPLETE CBC W/AUTO DIFF WBC: CPT

## 2021-11-16 PROCEDURE — 83036 HEMOGLOBIN GLYCOSYLATED A1C: CPT

## 2021-11-16 PROCEDURE — G0463 HOSPITAL OUTPT CLINIC VISIT: HCPCS | Performed by: STUDENT IN AN ORGANIZED HEALTH CARE EDUCATION/TRAINING PROGRAM

## 2021-11-16 RX ORDER — CYCLOBENZAPRINE HCL 10 MG
10 TABLET ORAL 2 TIMES DAILY
Qty: 60 TABLET | Refills: 0 | Status: SHIPPED | OUTPATIENT
Start: 2021-11-16 | End: 2021-12-16 | Stop reason: SDUPTHER

## 2021-11-16 RX ORDER — PRAVASTATIN SODIUM 40 MG/1
40 TABLET ORAL
Qty: 30 TABLET | Refills: 1 | Status: SHIPPED | OUTPATIENT
Start: 2021-11-16 | End: 2022-02-07 | Stop reason: SDUPTHER

## 2021-11-16 NOTE — PATIENT INSTRUCTIONS
Headache: Care Instructions  Your Care Instructions     Headaches have many possible causes. Most headaches aren't a sign of a more serious problem, and they will get better on their own. Home treatment may help you feel better faster. The doctor has checked you carefully, but problems can develop later. If you notice any problems or new symptoms, get medical treatment right away. Follow-up care is a key part of your treatment and safety. Be sure to make and go to all appointments, and call your doctor if you are having problems. It's also a good idea to know your test results and keep a list of the medicines you take. How can you care for yourself at home? · Do not drive if you have taken a prescription pain medicine. · Rest in a quiet, dark room until your headache is gone. Close your eyes and try to relax or go to sleep. Don't watch TV or read. · Put a cold, moist cloth or cold pack on the painful area for 10 to 20 minutes at a time. Put a thin cloth between the cold pack and your skin. · Use a warm, moist towel or a heating pad set on low to relax tight shoulder and neck muscles. · Have someone gently massage your neck and shoulders. · Take pain medicines exactly as directed. ? If the doctor gave you a prescription medicine for pain, take it as prescribed. ? If you are not taking a prescription pain medicine, ask your doctor if you can take an over-the-counter medicine. · Be careful not to take pain medicine more often than the instructions allow, because you may get worse or more frequent headaches when the medicine wears off. · Do not ignore new symptoms that occur with a headache, such as a fever, weakness or numbness, vision changes, or confusion. These may be signs of a more serious problem. To prevent headaches  · Keep a headache diary so you can figure out what triggers your headaches. Avoiding triggers may help you prevent headaches.  Record when each headache began, how long it lasted, and what the pain was like (throbbing, aching, stabbing, or dull). Write down any other symptoms you had with the headache, such as nausea, flashing lights or dark spots, or sensitivity to bright light or loud noise. Note if the headache occurred near your period. List anything that might have triggered the headache, such as certain foods (chocolate, cheese, wine) or odors, smoke, bright light, stress, or lack of sleep. · Find healthy ways to deal with stress. Headaches are most common during or right after stressful times. Take time to relax before and after you do something that has caused a headache in the past.  · Try to keep your muscles relaxed by keeping good posture. Check your jaw, face, neck, and shoulder muscles for tension, and try relaxing them. When sitting at a desk, change positions often, and stretch for 30 seconds each hour. · Get plenty of sleep and exercise. · Eat regularly and well. Long periods without food can trigger a headache. · Treat yourself to a massage. Some people find that regular massages are very helpful in relieving tension. · Limit caffeine by not drinking too much coffee, tea, or soda. But don't quit caffeine suddenly, because that can also give you headaches. · Reduce eyestrain from computers by blinking frequently and looking away from the computer screen every so often. Make sure you have proper eyewear and that your monitor is set up properly, about an arm's length away. · Seek help if you have depression or anxiety. Your headaches may be linked to these conditions. Treatment can both prevent headaches and help with symptoms of anxiety or depression. When should you call for help? Call 911 anytime you think you may need emergency care. For example, call if:    · You have signs of a stroke. These may include:  ? Sudden numbness, paralysis, or weakness in your face, arm, or leg, especially on only one side of your body. ? Sudden vision changes.   ? Sudden trouble speaking. ? Sudden confusion or trouble understanding simple statements. ? Sudden problems with walking or balance. ? A sudden, severe headache that is different from past headaches. Call your doctor now or seek immediate medical care if:    · You have a new or worse headache.     · Your headache gets much worse. Where can you learn more? Go to http://www.hanson.com/  Enter M271 in the search box to learn more about \"Headache: Care Instructions. \"  Current as of: April 8, 2021               Content Version: 13.0  © 2006-2021 [employer]. Care instructions adapted under license by [employer] (which disclaims liability or warranty for this information). If you have questions about a medical condition or this instruction, always ask your healthcare professional. [address] any warranty or liability for your use of this information. High Cholesterol: Care Instructions  Overview     Cholesterol is a type of fat in your blood. It is needed for many body functions, such as making new cells. Cholesterol is made by your body. It also comes from food you eat. High cholesterol means that you have too much of the fat in your blood. This raises your risk of a heart attack and stroke. LDL and HDL are part of your total cholesterol. LDL is the \"bad\" cholesterol. High LDL can raise your risk for coronary artery disease, heart attack, and stroke. HDL is the \"good\" cholesterol. It helps clear bad cholesterol from the body. High HDL is linked with a lower risk of coronary artery disease, heart attack, and stroke. Your cholesterol levels help your doctor find out your risk for having a heart attack or stroke. You and your doctor can talk about whether you need to lower your risk and what treatment is best for you. Treatment options include a heart-healthy lifestyle and medicine. Both options can help lower your cholesterol and your risk.  The way you choose to lower your risk will depend on how high your risk is for heart attack and stroke. It will also depend on how you feel about taking medicines. Follow-up care is a key part of your treatment and safety. Be sure to make and go to all appointments, and call your doctor if you are having problems. It's also a good idea to know your test results and keep a list of the medicines you take. How can you care for yourself at home? · Eat heart-healthy foods. ? Eat fruits, vegetables, whole grains, beans, and other high-fiber foods. ? Eat lean proteins, such as seafood, lean meats, beans, nuts, and soy products. ? Eat healthy fats, such as canola and olive oil. ? Choose foods that are low in saturated fat. ? Limit sodium and alcohol. ? Limit drinks and foods with added sugar. · Be physically active. Try to do moderate activity at least 2½ hours a week. Or try to do vigorous activity at least 1¼ hours a week. You may want to walk or try other activities, such as running, swimming, cycling, or playing tennis or team sports. · Stay at a healthy weight or lose weight by making the changes in eating and physical activity listed above. Losing just a small amount of weight, even 5 to 10 pounds, can help reduce your risk for having a heart attack or stroke. · Do not smoke. · Manage other health problems. These include diabetes and high blood pressure. If you think you may have a problem with alcohol or drug use, talk to your doctor. · If you take medicine, take it exactly as prescribed. Call your doctor if you think you are having a problem with your medicine. · Check with your doctor or pharmacist before you use any other medicines, including over-the-counter medicines. Make sure your doctor knows all of the medicines, vitamins, herbal products, and supplements you take. Taking some medicines together can cause problems. When should you call for help?   Watch closely for changes in your health, and be sure to contact your doctor if:    · You need help making lifestyle changes.     · You have questions about your medicine. Where can you learn more? Go to http://www.gray.com/  Enter C097 in the search box to learn more about \"High Cholesterol: Care Instructions. \"  Current as of: April 29, 2021               Content Version: 13.0  © 2006-2021 [employer]. Care instructions adapted under license by [employer] (which disclaims liability or warranty for this information). If you have questions about a medical condition or this instruction, always ask your healthcare professional. [address] any warranty or liability for your use of this information. Headache: Care Instructions  Your Care Instructions     Headaches have many possible causes. Most headaches aren't a sign of a more serious problem, and they will get better on their own. Home treatment may help you feel better faster. The doctor has checked you carefully, but problems can develop later. If you notice any problems or new symptoms, get medical treatment right away. Follow-up care is a key part of your treatment and safety. Be sure to make and go to all appointments, and call your doctor if you are having problems. It's also a good idea to know your test results and keep a list of the medicines you take. How can you care for yourself at home? · Do not drive if you have taken a prescription pain medicine. · Rest in a quiet, dark room until your headache is gone. Close your eyes and try to relax or go to sleep. Don't watch TV or read. · Put a cold, moist cloth or cold pack on the painful area for 10 to 20 minutes at a time. Put a thin cloth between the cold pack and your skin. · Use a warm, moist towel or a heating pad set on low to relax tight shoulder and neck muscles. · Have someone gently massage your neck and shoulders.   · Take pain medicines exactly as directed. ? If the doctor gave you a prescription medicine for pain, take it as prescribed. ? If you are not taking a prescription pain medicine, ask your doctor if you can take an over-the-counter medicine. · Be careful not to take pain medicine more often than the instructions allow, because you may get worse or more frequent headaches when the medicine wears off. · Do not ignore new symptoms that occur with a headache, such as a fever, weakness or numbness, vision changes, or confusion. These may be signs of a more serious problem. To prevent headaches  · Keep a headache diary so you can figure out what triggers your headaches. Avoiding triggers may help you prevent headaches. Record when each headache began, how long it lasted, and what the pain was like (throbbing, aching, stabbing, or dull). Write down any other symptoms you had with the headache, such as nausea, flashing lights or dark spots, or sensitivity to bright light or loud noise. Note if the headache occurred near your period. List anything that might have triggered the headache, such as certain foods (chocolate, cheese, wine) or odors, smoke, bright light, stress, or lack of sleep. · Find healthy ways to deal with stress. Headaches are most common during or right after stressful times. Take time to relax before and after you do something that has caused a headache in the past.  · Try to keep your muscles relaxed by keeping good posture. Check your jaw, face, neck, and shoulder muscles for tension, and try relaxing them. When sitting at a desk, change positions often, and stretch for 30 seconds each hour. · Get plenty of sleep and exercise. · Eat regularly and well. Long periods without food can trigger a headache. · Treat yourself to a massage. Some people find that regular massages are very helpful in relieving tension. · Limit caffeine by not drinking too much coffee, tea, or soda.  But don't quit caffeine suddenly, because that can also give you headaches. · Reduce eyestrain from computers by blinking frequently and looking away from the computer screen every so often. Make sure you have proper eyewear and that your monitor is set up properly, about an arm's length away. · Seek help if you have depression or anxiety. Your headaches may be linked to these conditions. Treatment can both prevent headaches and help with symptoms of anxiety or depression. When should you call for help? Call 911 anytime you think you may need emergency care. For example, call if:    · You have signs of a stroke. These may include:  ? Sudden numbness, paralysis, or weakness in your face, arm, or leg, especially on only one side of your body. ? Sudden vision changes. ? Sudden trouble speaking. ? Sudden confusion or trouble understanding simple statements. ? Sudden problems with walking or balance. ? A sudden, severe headache that is different from past headaches. Call your doctor now or seek immediate medical care if:    · You have a new or worse headache.     · Your headache gets much worse. Where can you learn more? Go to http://www.hanson.com/  Enter M271 in the search box to learn more about \"Headache: Care Instructions. \"  Current as of: April 8, 2021               Content Version: 13.0  © 2006-2021 [employer]. Care instructions adapted under license by [employer] (which disclaims liability or warranty for this information). If you have questions about a medical condition or this instruction, always ask your healthcare professional. [place] [address] any warranty or liability for your use of this information.

## 2021-11-16 NOTE — PROGRESS NOTES
Chichi Fraga, 57 y.o.,  male presents to the office as a new patient  Chief Complaint   Patient presents with    New Patient    Back Pain     under the care of ortho    Cholesterol Problem    Headache     hx of migraines     Neck Pain     under the care of ortho     Shoulder Pain     right rotator cuff (under the care of ortho)        SUBJECTIVE  1. Neck and back pain  Problem longstanding. History of cervical generative disc disease, fusion of cervical spine, fusion of lumbar spine, lumbar postlaminectomy syndrome. Follows up with Ortho. LOV 09/23/2021 (Dr. Marcus Story). 2.  Shoulder pain  History of right rotator cuff tear and surgical repair. Follows with Ortho. 3. Hyperlipidemia  Hyperlipidemia for 3 to 4 years. Takes pravastatin daily. Denies any side effects. Exhausted medication seeking refill. 4.  Elevated serum glucose  since 2017. Most recent hemoglobin A1c (10/30/2021) 5.6%   5. Frequent headaches  Problem longstanding. Location: occipital, 3/10. Takes Excedrin with relief. ROS:  Pertinent items are noted in HPI    OBJECTIVE    Physical Exam:     Visit Vitals  /84 (BP 1 Location: Right arm, BP Patient Position: Sitting, BP Cuff Size: Large adult)   Pulse 92   Temp 98.3 °F (36.8 °C) (Temporal)   Resp 16   Ht 6' 1\" (1.854 m)   Wt 204 lb (92.5 kg)   SpO2 98%   BMI 26.91 kg/m²       General: alert, well-appearing, in no apparent distress or pain  Head: atraumatic.  Non-tender maxillary and frontal sinuses  Eyes: Lids with no discharge, no matting, conjunctivae clear and non injected, full EOMs, PERLLA  Ears: pinna non-tender, external auditory canal patent, TM intact  Mouth/throat: teeth, gums, palate normal appearing, moist oral mucosa, tonsils non enlarged, pharynx non erythematous and no lesion  Neck: supple, no JVD , no carotid bruit, no adenopathy palpated  CVS: normal rate, regular rhythm, distinct S1 and S2, no murmurs, rubs clicks or gallops  Lungs: Breathing not labored, good chest excursion, clear to ausculation bilaterally, no crackles, wheezing or rhonchi noted  Abdomen: normoactive bowel sounds, soft, non-tender, no organomegaly  Extremities: no edema, no cyanosis,  Skin: warm, no lesions, rashes noted  Psych: alert and oriented x3, mood, insight, speech and affect normal    ASSESSMENT/PLAN  Diagnoses and all orders for this visit:      ICD-10-CM ICD-9-CM    1. Frequent headaches  R51.9 784.0 CBC WITH AUTOMATED DIFF      METABOLIC PANEL, COMPREHENSIVE  Most likely cervicogenic. Labs ordered to rule out other possible causes. 2. Mixed hyperlipidemia  E78.2 272.2 pravastatin (PRAVACHOL) 40 mg tablet      LIPID PANEL  Follow low-fat, low-cholesterol, high-fiber diet, exercise regularly (aerobic exercise preferred)   3. IGT (impaired glucose tolerance)  R73.02 790.22 HEMOGLOBIN A1C WITH EAG   4. Cervical pain (neck)  M54.2 723.1 This condition managed by the specialist.  Patient follows with Ortho. LOV 09/23/2021 (Dr. Thierno Bynum). Follow-up and Dispositions    · Return in about 6 weeks (around 12/28/2021) for annual physical exam.       Please note that this dictation was completed with [employer], the computer voice recognition software. Quite often unanticipated grammatical, syntax, homophones, and other interpretive errors are inadvertently transcribed by the computer software. Please disregard these errors. Please excuse any errors that have escaped final proofreading.

## 2021-11-19 NOTE — PROGRESS NOTES
Recent labs showed no abnormal findings except Hemoglobin A1c 5.7% (prediabetes).   Please advise patient to follow low-carb diet and regular exercise

## 2021-11-19 NOTE — PROGRESS NOTES
[phone] (home)  2 patient identifiers verified with Luis Zaragoza (name/). Demetrio Wilkinson was advised that his labs showed no abnormal findings except Hemoglobin A1c 5.7% which puts him in the (prediabetes). Demetrio Wilkinson was advise to follow low-carb diet and regular exercise. Demetrio Wilkinson voice understanding.

## 2021-11-30 ENCOUNTER — TRANSCRIBE ORDER (OUTPATIENT)
Dept: SCHEDULING | Age: 61
End: 2021-11-30

## 2021-11-30 DIAGNOSIS — M96.1 POST LAMINECTOMY SYNDROME: ICD-10-CM

## 2021-11-30 DIAGNOSIS — G89.29 CHRONIC PAIN: ICD-10-CM

## 2021-11-30 DIAGNOSIS — S32.009K PSEUDOARTHROSIS OF LUMBAR SPINE: Primary | ICD-10-CM

## 2021-12-16 DIAGNOSIS — M62.838 MUSCLE SPASM: ICD-10-CM

## 2021-12-16 RX ORDER — CYCLOBENZAPRINE HCL 10 MG
10 TABLET ORAL
Qty: 30 TABLET | Refills: 2 | Status: SHIPPED | OUTPATIENT
Start: 2021-12-16 | End: 2022-02-10 | Stop reason: SDUPTHER

## 2021-12-16 NOTE — TELEPHONE ENCOUNTER
Last Visit: 9/23/21 with MD Swetha Puga  Next Appointment: 2/10/22 with STARLA Alcazar  Previous Refill Encounter(s): 11/16/21 #60    Requested Prescriptions     Pending Prescriptions Disp Refills    cyclobenzaprine (FLEXERIL) 10 mg tablet 60 Tablet 0     Sig: Take 1 Tablet by mouth two (2) times a day.

## 2021-12-20 ENCOUNTER — HOSPITAL ENCOUNTER (OUTPATIENT)
Dept: CT IMAGING | Age: 61
Discharge: HOME | End: 2021-12-20
Attending: ORTHOPAEDIC SURGERY
Payer: OTHER MISCELLANEOUS

## 2021-12-20 DIAGNOSIS — G89.29 CHRONIC PAIN: ICD-10-CM

## 2021-12-20 DIAGNOSIS — M96.1 POST LAMINECTOMY SYNDROME: ICD-10-CM

## 2021-12-20 DIAGNOSIS — S32.009K PSEUDOARTHROSIS OF LUMBAR SPINE: ICD-10-CM

## 2021-12-20 PROCEDURE — 72131 CT LUMBAR SPINE W/O DYE: CPT

## 2022-02-07 DIAGNOSIS — E78.2 MIXED HYPERLIPIDEMIA: ICD-10-CM

## 2022-02-07 NOTE — TELEPHONE ENCOUNTER
This patient contacted office for the following prescriptions to be filled:    Medication requested :   Requested Prescriptions     Pending Prescriptions Disp Refills    pravastatin (PRAVACHOL) 40 mg tablet 30 Tablet 1     Sig: Take 1 Tablet by mouth nightly. PCP: [facility]  Pharmacy or Print: [facility]  Mail order or Local pharmacy [address] [place] [address]    Scheduled appointment if not seen by current providers in office: LOV 11/16/2021 f/u 2/11/2022 pt was told in January that Sylvia Barrera was leaving and didn't r/s his appt. He is out of meds.

## 2022-02-08 RX ORDER — PRAVASTATIN SODIUM 40 MG/1
40 TABLET ORAL
Qty: 30 TABLET | Refills: 1 | Status: SHIPPED | OUTPATIENT
Start: 2022-02-08 | End: 2022-02-11 | Stop reason: SDUPTHER

## 2022-02-08 NOTE — TELEPHONE ENCOUNTER
Spoke with Mr. Hamlin July to inform medication has been e-scribed to pharmacy on file and to keep upcoming scheduled appointment on 02- @ 1:30 pm

## 2022-02-10 ENCOUNTER — OFFICE VISIT (OUTPATIENT)
Dept: ORTHOPEDIC SURGERY | Age: 62
End: 2022-02-10
Payer: MEDICARE

## 2022-02-10 VITALS
WEIGHT: 204 LBS | HEIGHT: 73 IN | OXYGEN SATURATION: 97 % | BODY MASS INDEX: 27.04 KG/M2 | TEMPERATURE: 97.3 F | HEART RATE: 86 BPM

## 2022-02-10 DIAGNOSIS — Z98.1 HISTORY OF LUMBAR SPINAL FUSION: ICD-10-CM

## 2022-02-10 DIAGNOSIS — M47.812 CERVICAL SPONDYLOSIS: ICD-10-CM

## 2022-02-10 DIAGNOSIS — M54.59 MECHANICAL LOW BACK PAIN: Primary | ICD-10-CM

## 2022-02-10 DIAGNOSIS — M96.1 LUMBAR POST-LAMINECTOMY SYNDROME: ICD-10-CM

## 2022-02-10 DIAGNOSIS — M62.838 MUSCLE SPASM: ICD-10-CM

## 2022-02-10 PROCEDURE — 99214 OFFICE O/P EST MOD 30 MIN: CPT | Performed by: NURSE PRACTITIONER

## 2022-02-10 RX ORDER — CYCLOBENZAPRINE HCL 10 MG
20 TABLET ORAL
Qty: 60 TABLET | Refills: 5 | Status: SHIPPED | OUTPATIENT
Start: 2022-02-10 | End: 2022-08-10 | Stop reason: SDUPTHER

## 2022-02-10 RX ORDER — IBUPROFEN 800 MG/1
TABLET ORAL
COMMUNITY
Start: 2022-02-04 | End: 2022-02-10

## 2022-02-10 NOTE — PROGRESS NOTES
Vianca Blum presents today for   Chief Complaint   Patient presents with    Neck Pain    Back Pain       Is someone accompanying this pt? no    Is the patient using any DME equipment during OV? no    Depression Screening:  3 most recent PHQ Screens 11/16/2021   Little interest or pleasure in doing things Not at all   Feeling down, depressed, irritable, or hopeless Not at all   Total Score PHQ 2 0       Learning Assessment:  Learning Assessment 11/16/2021   PRIMARY LEARNER Patient   HIGHEST LEVEL OF EDUCATION - PRIMARY LEARNER  [id number] PRIMARY LEARNER NONE   CO-LEARNER CAREGIVER No   PRIMARY LANGUAGE ENGLISH   LEARNER PREFERENCE PRIMARY DEMONSTRATION     LISTENING     READING     OTHER (COMMENT)   ANSWERED BY Shane Watt   RELATIONSHIP SELF       Abuse Screening:  Abuse Screening Questionnaire 11/16/2021   Do you ever feel afraid of your partner? N   Are you in a relationship with someone who physically or mentally threatens you? N   Is it safe for you to go home? Y       Fall Risk  Fall Risk Assessment, last 12 mths 11/16/2021   Able to walk? Yes   Fall in past 12 months? 0   Do you feel unsteady? 0   Are you worried about falling 0       OPIOID RISK TOOL  No flowsheet data found. Coordination of Care:  1. Have you been to the ER, urgent care clinic since your last visit? no  Hospitalized since your last visit? no    2. Have you seen or consulted any other health care providers outside of the [address] [place] [address] since your last visit? no Include any pap smears or colon screening.  no

## 2022-02-10 NOTE — PROGRESS NOTES
Chief complaint   Chief Complaint   Patient presents with    Neck Pain    Back Pain       History of Present Illness:  Fidel Parker is a  57 y.o.  male who comes in today for follow-up of his chronic neck and back pain. He has had both cervical and lumbar fusion in the past.  He recently saw Dr. Mary Chauhan for evaluation of his hardware as he felt like the hardware was causing him pain. Dr. Mary Chauhan told him that he did not see anything surgically that he could do for him. Patient states he still gets back pain without leg pain. It is increased with activity. He also has neck pain but the headaches he was reporting earlier are improved with Flexeril 20 mg at night. She was taking 10 mg but he states Dr. Last Gallardo told him to go to 20 mg at night. He does find this more helpful. He does do exercises for flares of neck pain and that also helps. He denies fever bowel bladder dysfunction. He is still waiting for a right rotator cuff repair to be approved by [employer]. Physical Exam: The patient is a 69-year-old male well-developed well-nourished who is alert and oriented with a normal mood and affect. He has a full weightbearing antalgic gait. He has 5 5 strength bilateral upper and lower extremities. Negative straight leg raise. Negative Yuliya's. He has pain with hyperextension lumbar spine. Assessment and Plan: This is a patient who has post lumbar laminectomy syndrome lumbar and cervical spondylosis I increased his Flexeril to 20 mg at night. We will see him back in 6-month sooner if needed. Medications:  Current Outpatient Medications   Medication Sig Dispense Refill    cyclobenzaprine (FLEXERIL) 10 mg tablet Take 2 Tablets by mouth nightly. Indications: muscle spasm 60 Tablet 5    pravastatin (PRAVACHOL) 40 mg tablet Take 1 Tablet by mouth nightly. 30 Tablet 1    aspirin-acetaminophen-caffeine (Excedrin Migraine) 250-250-65 mg per tablet Take 1 Tab by mouth.       ascorbic acid, vitamin C, (VITAMIN C) 1,000 mg tablet Take 1,000 mg by mouth daily.  vitamin E (AQUA GEMS) 400 unit capsule Take 400 Units by mouth daily.  melatonin 5 mg cap capsule Take 5 mg by mouth nightly. (Patient not taking: Reported on 2/10/2022)             Review of systems:    Past Medical History:   Diagnosis Date    Generalized osteoarthrosis, involving multiple sites     Headache(784.0) April 2011    Low back pain     Lumbar postlaminectomy syndrome     Mixed hyperlipidemia     Neck pain     with headaches    Right arm pain April 2011    Spinal cord stimulator status     placed and removed due to little benefit     Past Surgical History:   Procedure Laterality Date    HX CERVICAL FUSION  2003    C4/5    HX COLONOSCOPY  12/2012; 1/2018    polyps, diverticula; 2nd colo neg    HX LUMBAR FUSION      L3-sacrum fused, reports 5 lumbar sx, Stas Meraz and Jovi Walker SHOULDER ARTHROSCOPY Right 01/2017    HX TONSIL AND ADENOIDECTOMY       Social History     Socioeconomic History    Marital status:      Spouse name: Not on file    Number of children: Not on file    Years of education: Not on file    Highest education level: Not on file   Occupational History    Not on file   Tobacco Use    Smoking status: Never Smoker    Smokeless tobacco: Former User   Vaping Use    Vaping Use: Never used   Substance and Sexual Activity    Alcohol use:  Yes     Alcohol/week: 1.0 standard drink     Types: 1 Standard drinks or equivalent per week     Comment: Rare    Drug use: Never    Sexual activity: Yes     Partners: Female   Other Topics Concern     Service Not Asked    Blood Transfusions Not Asked    Caffeine Concern Not Asked    Occupational Exposure Not Asked    Hobby Hazards Not Asked    Sleep Concern Not Asked    Stress Concern Not Asked    Weight Concern Not Asked    Special Diet Not Asked    Back Care Not Asked    Exercise Not Asked    Bike Helmet Not Asked    Seat Belt Not Asked    Self-Exams Not Asked   Social History Narrative    Not on file     Social Determinants of Health     Financial Resource Strain:     Difficulty of Paying Living Expenses: Not on file   Food Insecurity:     Worried About Running Out of Food in the Last Year: Not on file    Myrna of Food in the Last Year: Not on file   Transportation Needs:     Lack of Transportation (Medical): Not on file    Lack of Transportation (Non-Medical): Not on file   Physical Activity:     Days of Exercise per Week: Not on file    Minutes of Exercise per Session: Not on file   Stress:     Feeling of Stress : Not on file   Social Connections:     Frequency of Communication with Friends and Family: Not on file    Frequency of Social Gatherings with Friends and Family: Not on file    Attends Anabaptism Services: Not on file    Active Member of [address] [place] [address] [employer] or Organizations: Not on file    Attends Club or Organization Meetings: Not on file    Marital Status: Not on file   Intimate Partner Violence:     Fear of Current or Ex-Partner: Not on file    Emotionally Abused: Not on file    Physically Abused: Not on file    Sexually Abused: Not on file   Housing Stability:     Unable to Pay for Housing in the Last Year: Not on file    Number of [place] in the Last Year: Not on file    Unstable Housing in the Last Year: Not on file     Family History   Problem Relation Age of Onset    Cancer Sister     Diabetes Neg Hx     Heart Disease Neg Hx     Hypertension Neg Hx     Stroke Neg Hx        Physical Exam:  Visit Vitals  Pulse 86   Temp 97.3 °F (36.3 °C) (Temporal)   Ht 6' 1\" (1.854 m)   Wt 204 lb (92.5 kg)   SpO2 97%   BMI 26.91 kg/m²     Pain Scale: 4/10       has been . reviewed and is appropriate          Diagnoses and all orders for this visit:    1. Mechanical low back pain  -     REFERRAL TO PAIN MANAGEMENT    2. Muscle spasm  -     cyclobenzaprine (FLEXERIL) 10 mg tablet;  Take 2 Tablets by mouth nightly. Indications: muscle spasm  -     REFERRAL TO PAIN MANAGEMENT    3. History of lumbar spinal fusion  -     REFERRAL TO PAIN MANAGEMENT    4. Lumbar post-laminectomy syndrome  -     REFERRAL TO PAIN MANAGEMENT    5. Cervical spondylosis  -     REFERRAL TO PAIN MANAGEMENT            Follow-up and Dispositions    · Return in about 6 months (around 8/10/2022) for with NP Upton.              We have informed Andrea Conde to notify us for immediate appointment if he has any worsening neurogical symptoms or if an emergency situation presents, then call [phone]

## 2022-02-11 ENCOUNTER — OFFICE VISIT (OUTPATIENT)
Dept: FAMILY MEDICINE CLINIC | Age: 62
End: 2022-02-11
Payer: MEDICARE

## 2022-02-11 VITALS
BODY MASS INDEX: 26.51 KG/M2 | TEMPERATURE: 98 F | WEIGHT: 200 LBS | DIASTOLIC BLOOD PRESSURE: 82 MMHG | HEIGHT: 73 IN | RESPIRATION RATE: 18 BRPM | SYSTOLIC BLOOD PRESSURE: 120 MMHG | HEART RATE: 73 BPM | OXYGEN SATURATION: 98 %

## 2022-02-11 VITALS
TEMPERATURE: 98 F | WEIGHT: 200 LBS | HEIGHT: 73 IN | BODY MASS INDEX: 26.51 KG/M2 | DIASTOLIC BLOOD PRESSURE: 82 MMHG | HEART RATE: 73 BPM | SYSTOLIC BLOOD PRESSURE: 120 MMHG | OXYGEN SATURATION: 98 % | RESPIRATION RATE: 16 BRPM

## 2022-02-11 DIAGNOSIS — R73.03 PREDIABETES: ICD-10-CM

## 2022-02-11 DIAGNOSIS — Z00.00 WELCOME TO MEDICARE PREVENTIVE VISIT: Primary | ICD-10-CM

## 2022-02-11 DIAGNOSIS — Z13.6 SCREENING FOR CARDIOVASCULAR CONDITION: ICD-10-CM

## 2022-02-11 DIAGNOSIS — E78.2 MIXED HYPERLIPIDEMIA: Primary | ICD-10-CM

## 2022-02-11 DIAGNOSIS — M54.2 CERVICAL PAIN (NECK): ICD-10-CM

## 2022-02-11 PROCEDURE — G8432 DEP SCR NOT DOC, RNG: HCPCS | Performed by: STUDENT IN AN ORGANIZED HEALTH CARE EDUCATION/TRAINING PROGRAM

## 2022-02-11 PROCEDURE — G8427 DOCREV CUR MEDS BY ELIG CLIN: HCPCS | Performed by: STUDENT IN AN ORGANIZED HEALTH CARE EDUCATION/TRAINING PROGRAM

## 2022-02-11 PROCEDURE — 3017F COLORECTAL CA SCREEN DOC REV: CPT | Performed by: STUDENT IN AN ORGANIZED HEALTH CARE EDUCATION/TRAINING PROGRAM

## 2022-02-11 PROCEDURE — 99214 OFFICE O/P EST MOD 30 MIN: CPT | Performed by: STUDENT IN AN ORGANIZED HEALTH CARE EDUCATION/TRAINING PROGRAM

## 2022-02-11 PROCEDURE — G8419 CALC BMI OUT NRM PARAM NOF/U: HCPCS | Performed by: STUDENT IN AN ORGANIZED HEALTH CARE EDUCATION/TRAINING PROGRAM

## 2022-02-11 PROCEDURE — G0402 INITIAL PREVENTIVE EXAM: HCPCS | Performed by: STUDENT IN AN ORGANIZED HEALTH CARE EDUCATION/TRAINING PROGRAM

## 2022-02-11 PROCEDURE — G0463 HOSPITAL OUTPT CLINIC VISIT: HCPCS | Performed by: STUDENT IN AN ORGANIZED HEALTH CARE EDUCATION/TRAINING PROGRAM

## 2022-02-11 RX ORDER — PRAVASTATIN SODIUM 40 MG/1
40 TABLET ORAL
Qty: 90 TABLET | Refills: 0 | Status: SHIPPED | OUTPATIENT
Start: 2022-02-11 | End: 2022-04-14 | Stop reason: SDUPTHER

## 2022-02-11 NOTE — PROGRESS NOTES
Chief Complaint   Patient presents with    Annual Wellness Visit     welcome to medicare      Abuse Screening Questionnaire 2/11/2022   Do you ever feel afraid of your partner? N   Are you in a relationship with someone who physically or mentally threatens you? N   Is it safe for you to go home? Y     Fall Risk Assessment, last 12 mths 2/11/2022   Able to walk? Yes   Fall in past 12 months? 0   Do you feel unsteady?  0   Are you worried about falling 0       3 most recent PHQ Screens 2/11/2022   Little interest or pleasure in doing things Not at all   Feeling down, depressed, irritable, or hopeless Not at all   Total Score PHQ 2 0   Trouble falling or staying asleep, or sleeping too much Not at all   Feeling tired or having little energy Not at all   Poor appetite, weight loss, or overeating Not at all   Feeling bad about yourself - or that you are a failure or have let yourself or your family down Not at all   Trouble concentrating on things such as school, work, reading, or watching TV Not at all   Moving or speaking so slowly that other people could have noticed; or the opposite being so fidgety that others notice Not at all   Thoughts of being better off dead, or hurting yourself in some way Not at all   PHQ 9 Score 0   How difficult have these problems made it for you to do your work, take care of your home and get along with others Not difficult at all

## 2022-02-11 NOTE — PATIENT INSTRUCTIONS
Learning About High Cholesterol  What is high cholesterol? High cholesterol means that you have too much cholesterol in your blood. Cholesterol is a type of fat. It's needed for many body functions, such as making new cells. Cholesterol is made by your body. It also comes from food you eat. Having high cholesterol can lead to the buildup of plaque in artery walls. This can increase your risk of heart attack and stroke. When your doctor talks about high cholesterol levels, your doctor is talking about your total cholesterol and LDL cholesterol (the \"bad\" cholesterol) levels. Your doctor may also speak about HDL (the \"good\" cholesterol) levels. High HDL is linked with a lower risk for coronary artery disease, heart attack, and stroke. Your cholesterol levels help your doctor find out your risk for having a heart attack or stroke. How can you help prevent high cholesterol? A heart-healthy lifestyle can help you prevent high cholesterol and lower your risk for a heart attack and stroke. · Eat heart-healthy foods. ? Eat fruits, vegetables, whole grains, beans, and other high-fiber foods. ? Eat lean proteins, such as seafood, lean meats, beans, nuts, and soy products. ? Eat healthy fats, such as canola and olive oil. ? Choose foods that are low in saturated fat. ? Limit sodium and alcohol. ? Limit drinks and foods with added sugar. · Be active. Try to do moderate activity at least 2½ hours a week. Or try vigorous activity at least 1¼ hours a week. You may want to walk or try other activities, such as running, swimming, cycling, or playing tennis or team sports. · Stay at a healthy weight. Lose weight if you need to. · Don't smoke. If you need help quitting, talk to your doctor about stop-smoking programs and medicines. These can increase your chances of quitting for good. How is high cholesterol treated? The goal of treatment is to reduce your chances of having a heart attack or stroke.  The goal is not to lower your cholesterol numbers only. · Have a heart-healthy lifestyle. This includes eating healthy foods, not smoking, losing weight, and being more active. · You may choose to take medicine. Follow-up care is a key part of your treatment and safety. Be sure to make and go to all appointments, and call your doctor if you are having problems. It's also a good idea to know your test results and keep a list of the medicines you take. Where can you learn more? Go to http://www.hanson.com/  Enter Q621 in the search box to learn more about \"Learning About High Cholesterol. \"  Current as of: April 29, 2021               Content Version: 13.0  © 2006-2021 [employer]. Care instructions adapted under license by [employer] (which disclaims liability or warranty for this information). If you have questions about a medical condition or this instruction, always ask your healthcare professional. [place] [address] any warranty or liability for your use of this information. High Cholesterol: Care Instructions  Overview     Cholesterol is a type of fat in your blood. It is needed for many body functions, such as making new cells. Cholesterol is made by your body. It also comes from food you eat. High cholesterol means that you have too much of the fat in your blood. This raises your risk of a heart attack and stroke. LDL and HDL are part of your total cholesterol. LDL is the \"bad\" cholesterol. High LDL can raise your risk for coronary artery disease, heart attack, and stroke. HDL is the \"good\" cholesterol. It helps clear bad cholesterol from the body. High HDL is linked with a lower risk of coronary artery disease, heart attack, and stroke. Your cholesterol levels help your doctor find out your risk for having a heart attack or stroke.  You and your doctor can talk about whether you need to lower your risk and what treatment is best for you.  Treatment options include a heart-healthy lifestyle and medicine. Both options can help lower your cholesterol and your risk. The way you choose to lower your risk will depend on how high your risk is for heart attack and stroke. It will also depend on how you feel about taking medicines. Follow-up care is a key part of your treatment and safety. Be sure to make and go to all appointments, and call your doctor if you are having problems. It's also a good idea to know your test results and keep a list of the medicines you take. How can you care for yourself at home? · Eat heart-healthy foods. ? Eat fruits, vegetables, whole grains, beans, and other high-fiber foods. ? Eat lean proteins, such as seafood, lean meats, beans, nuts, and soy products. ? Eat healthy fats, such as canola and olive oil. ? Choose foods that are low in saturated fat. ? Limit sodium and alcohol. ? Limit drinks and foods with added sugar. · Be physically active. Try to do moderate activity at least 2½ hours a week. Or try to do vigorous activity at least 1¼ hours a week. You may want to walk or try other activities, such as running, swimming, cycling, or playing tennis or team sports. · Stay at a healthy weight or lose weight by making the changes in eating and physical activity listed above. Losing just a small amount of weight, even 5 to 10 pounds, can help reduce your risk for having a heart attack or stroke. · Do not smoke. · Manage other health problems. These include diabetes and high blood pressure. If you think you may have a problem with alcohol or drug use, talk to your doctor. · If you take medicine, take it exactly as prescribed. Call your doctor if you think you are having a problem with your medicine. · Check with your doctor or pharmacist before you use any other medicines, including over-the-counter medicines.  Make sure your doctor knows all of the medicines, vitamins, herbal products, and supplements you take. Taking some medicines together can cause problems. When should you call for help? Watch closely for changes in your health, and be sure to contact your doctor if:    · You need help making lifestyle changes.     · You have questions about your medicine. Where can you learn more? Go to http://www.gray.com/  Enter V746 in the search box to learn more about \"High Cholesterol: Care Instructions. \"  Current as of: April 29, 2021               Content Version: 13.0  © 2006-2021 [employer]. Care instructions adapted under license by [employer] (which disclaims liability or warranty for this information). If you have questions about a medical condition or this instruction, always ask your healthcare professional. [address] any warranty or liability for your use of this information.

## 2022-02-11 NOTE — PROGRESS NOTES
Comfort Toth, 57 y.o.,  male presents to the office as a new patient  Chief Complaint   Patient presents with    Results     review of results     Cholesterol Problem    Other     IFG       SUBJECTIVE  1. Hyperlipidemia  Hyperlipidemia for 3 to 4 years. On pravastatin 40 mg/daily. Denies side effects. Last lipid profile (11/16/2021): LDL 93.8  2. Prediabetes  Prediabetes since 2017. Last Hgb A1c (11/16/20210: 5.7%  Diabetic ROS: no polyuria or polydipsia, no chest pain or dyspnea, no numbness, tingling or pain in extremities, no unusual visual symptoms, blurry vision. 3. Neck and back pain  Problem longstanding. History of cervical generative disc disease, fusion of cervical spine, fusion of lumbar spine, lumbar postlaminectomy syndrome. Neck pain improved after treatment. Follows up with Ortho. LOV yesterday Ihsan STARLA Gutierrez). 5.  Frequent headaches - Improved, after treated for neck pain    ROS:  Pertinent items are noted in HPI    OBJECTIVE    Physical Exam:     Visit Vitals  /82 (BP 1 Location: Left arm, BP Patient Position: Sitting, BP Cuff Size: Large adult)   Pulse 73   Temp 98 °F (36.7 °C) (Temporal)   Resp 16   Ht 6' 1\" (1.854 m)   Wt 200 lb (90.7 kg)   SpO2 98%   BMI 26.39 kg/m²       General: alert, well-appearing, in no apparent distress or pain  CVS: normal rate, regular rhythm, distinct S1 and S2  Lungs: Breathing not labored, good chest excursion, clear to ausculation bilaterally, no crackles, wheezing or rhonchi noted  Abdomen: soft, non-tender  Extremities: no edema  Skin: warm, no lesions, rashes noted  Psych: alert and oriented x3    ASSESSMENT/PLAN  Diagnoses and all orders for this visit:      ICD-10-CM ICD-9-CM    1. Mixed hyperlipidemia  E78.2 272.2 pravastatin (PRAVACHOL) 40 mg tablet  Follow low-fat, low-cholesterol, high-fiber diet, exercise as tolerable. Take medication as prescribed.    2. Cervical pain (neck)  M54.2 723.1 Condition managed by the specialist.  Patient follows with Ortho. LOV 02/10/2022 Randy Alvarez NP). 3. Prediabetes  R73.03 790.29 Weight loss, low-carb diet, regular exercise recommended       Follow-up and Dispositions    · Return in about 4 months (around 6/11/2022) for routine care (Dr. Dulce Maria Rodriguez). Medical dictation software was used for portions of this report. Unintended voice recognition errors may occur.

## 2022-02-11 NOTE — PROGRESS NOTES
Chief Complaint   Patient presents with    Results     review of results     Cholesterol Problem    Other     IFG     1. \"Have you been to the ER, urgent care clinic since your last visit? Hospitalized since your last visit? \" No    2. \"Have you seen or consulted any other health care providers outside of the [address] [place] [address] since your last visit? \" No     3. For patients aged 39-70: Has the patient had a colonoscopy / FIT/ Cologuard?  Yes - no Care Gap present    Health Maintenance Due   Topic Date Due    Shingrix Vaccine Age 49> (1 of 2) Never done    COVID-19 Vaccine (3 - Booster for Moderna series) 09/29/2021    Medicare Yearly Exam  Never done

## 2022-02-11 NOTE — PATIENT INSTRUCTIONS
Medicare Wellness Visit, Male    The best way to live healthy is to have a lifestyle where you eat a well-balanced diet, exercise regularly, limit alcohol use, and quit all forms of tobacco/nicotine, if applicable. Regular preventive services are another way to keep healthy. Preventive services (vaccines, screening tests, monitoring & exams) can help personalize your care plan, which helps you manage your own care. Screening tests can find health problems at the earliest stages, when they are easiest to treat. [facility] follows the current, evidence-based guidelines published by the [facility] ([facility]) when recommending preventive services for our patients. Because we follow these guidelines, sometimes recommendations change over time as research supports it. (For example, a prostate screening blood test is no longer routinely recommended for men with no symptoms). Of course, you and your doctor may decide to screen more often for some diseases, based on your risk and co-morbidities (chronic disease you are already diagnosed with). Preventive services for you include:  - Medicare offers their members a free annual wellness visit, which is time for you and your primary care provider to discuss and plan for your preventive service needs. Take advantage of this benefit every year!  -All adults over age 72 should receive the recommended pneumonia vaccines. Current USPSTF guidelines recommend a series of two vaccines for the best pneumonia protection.   -All adults should have a flu vaccine yearly and tetanus vaccine every 10 years.  -All adults age 48 and older should receive the shingles vaccines (series of two vaccines).        -All adults age 38-68 who are overweight should have a diabetes screening test once every three years.   -Other screening tests & preventive services for persons with diabetes include: an eye exam to screen for diabetic retinopathy, a kidney function test, a foot exam, and stricter control over your cholesterol.   -Cardiovascular screening for adults with routine risk involves an electrocardiogram (ECG) at intervals determined by the provider.   -Colorectal cancer screening should be done for adults age 54-65 with no increased risk factors for colorectal cancer. There are a number of acceptable methods of screening for this type of cancer. Each test has its own benefits and drawbacks. Discuss with your provider what is most appropriate for you during your annual wellness visit. The different tests include: colonoscopy (considered the best screening method), a fecal occult blood test, a fecal DNA test, and sigmoidoscopy.  -All adults born between [facility] should be screened once for Hepatitis C.  -An Abdominal Aortic Aneurysm (AAA) Screening is recommended for men age 73-68 who has ever smoked in their lifetime.      Here is a list of your current Health Maintenance items (your personalized list of preventive services) with a due date:  Health Maintenance Due   Topic Date Due    Shingles Vaccine (1 of 2) Never done    COVID-19 Vaccine (3 - Booster for Georgeana Crumble series) 09/29/2021    Annual Well Visit  Never done

## 2022-02-11 NOTE — PROGRESS NOTES
This is a \"Welcome to [employer]"  Initial Preventive Physical Examination (IPPE) providing Personalized Prevention Plan Services (Performed in the first 12 months of enrollment)    I have reviewed the patient's medical history in detail and updated the computerized patient record. Assessment/Plan   Education and counseling provided:  Are appropriate based on today's review and evaluation  End-of-Life planning (with patient's consent)  Pneumococcal Vaccine: UTD  Influenza Vaccine: UTD  Covid-19: UTD, including booster dose  Colorectal cancer screening tests: Next due on 1/3/2023  Cardiovascular screening blood test: completed on 11/16/2021  Diabetes screening test: completed on 11/16/2021  ECG: ordered today    1. Welcome to Medicare preventive visit  -     EKG, 12 LEAD, INITIAL; Future  2. Screening for cardiovascular condition  -     EKG, 12 LEAD, INITIAL;  Future       Depression Risk Screen     3 most recent PHQ Screens 2/11/2022   Little interest or pleasure in doing things Not at all   Feeling down, depressed, irritable, or hopeless Not at all   Total Score PHQ 2 0   Trouble falling or staying asleep, or sleeping too much Not at all   Feeling tired or having little energy Not at all   Poor appetite, weight loss, or overeating Not at all   Feeling bad about yourself - or that you are a failure or have let yourself or your family down Not at all   Trouble concentrating on things such as school, work, reading, or watching TV Not at all   Moving or speaking so slowly that other people could have noticed; or the opposite being so fidgety that others notice Not at all   Thoughts of being better off dead, or hurting yourself in some way Not at all   PHQ 9 Score 0   How difficult have these problems made it for you to do your work, take care of your home and get along with others Not difficult at all       Alcohol & Drug Abuse Risk Screen    Do you average more than 2 drinks per night or 14 drinks a week: No    On any one occasion in the past three months have you have had more than 4 drinks containing alcohol:  No          Functional Ability and Level of Safety    Diet: No special diet      Hearing: waiting for hearing aids      Vision Screening:  Vision is good. Visual Acuity Screening    Right eye Left eye Both eyes   Without correction: 20/20 20/20 20/20   With correction:            Activities of Daily Living: The home contains: no safety equipment. Patient does total self care      Ambulation: with no difficulty      Exercise level: moderately active     Fall Risk Screen:  Fall Risk Assessment, last 12 mths 2/11/2022   Able to walk? Yes   Fall in past 12 months? 0   Do you feel unsteady? 0   Are you worried about falling 0      Abuse Screen:  Patient is not abused       Screening EKG   EKG order placed: Yes    End of Life Planning   Advanced care planning directives were discussed with the patient and /or family/caregiver.      Health Maintenance Due     Health Maintenance Due   Topic Date Due    Shingrix Vaccine Age 49> (1 of 2) Never done    COVID-19 Vaccine (3 - Booster for Bear Batch series) 09/29/2021    Medicare Yearly Exam  Never done       Patient Care Team   Patient Care Team:  Jose Luna PA-C as PCP - General (Physician Assistant)  Jose Luna PA-C as PCP - [facility] Empaneled Provider  Theodora Alonzo NP (Orthopedic Surgery)  Huy Thompson MD (Gastroenterology)    History     Past Medical History:   Diagnosis Date    Generalized osteoarthrosis, involving multiple sites     Headache(784.0) April 2011    Low back pain     Lumbar postlaminectomy syndrome     Mixed hyperlipidemia     Neck pain     with headaches    Right arm pain April 2011    Spinal cord stimulator status     placed and removed due to little benefit      Past Surgical History:   Procedure Laterality Date    HX CERVICAL FUSION  2003    C4/5    HX COLONOSCOPY  12/2012; 1/2018    polyps, diverticula; 2nd colo neg    HX LUMBAR FUSION      L3-sacrum fused, reports 5 lumbar sx, Stas [place] and [place] Garvey HX SHOULDER ARTHROSCOPY Right 01/2017    HX TONSIL AND ADENOIDECTOMY       Current Outpatient Medications   Medication Sig Dispense Refill    cyclobenzaprine (FLEXERIL) 10 mg tablet Take 2 Tablets by mouth nightly. Indications: muscle spasm 60 Tablet 5    ascorbic acid, vitamin C, (VITAMIN C) 1,000 mg tablet Take 1,000 mg by mouth daily.  vitamin E (AQUA GEMS) 400 unit capsule Take 400 Units by mouth daily.  melatonin 5 mg cap capsule Take 5 mg by mouth nightly. 10 mg      pravastatin (PRAVACHOL) 40 mg tablet Take 1 Tablet by mouth nightly. 90 Tablet 0    aspirin-acetaminophen-caffeine (Excedrin Migraine) 250-250-65 mg per tablet Take 1 Tab by mouth. Allergies   Allergen Reactions    Morphine Other (comments)     Hallucinations    Percocet [Oxycodone-Acetaminophen] Other (comments)     Patients states he doesn't like weaning off of them. Family History   Problem Relation Age of Onset    Cancer Sister     Diabetes Neg Hx     Heart Disease Neg Hx     Hypertension Neg Hx     Stroke Neg Hx      Social History     Tobacco Use    Smoking status: Never Smoker    Smokeless tobacco: Former User   Substance Use Topics    Alcohol use:  Yes     Alcohol/week: 1.0 standard drink     Types: 1 Standard drinks or equivalent per week     Comment: Jonelle SANDY PA-C

## 2022-04-14 DIAGNOSIS — E78.2 MIXED HYPERLIPIDEMIA: ICD-10-CM

## 2022-04-14 NOTE — TELEPHONE ENCOUNTER
This pharmacy faxed over request for the following prescriptions to be filled:    Medication requested :   Requested Prescriptions     Pending Prescriptions Disp Refills    pravastatin (PRAVACHOL) 40 mg tablet 90 Tablet 0     Sig: Take 1 Tablet by mouth nightly. PCP: [facility]   Pharmacy or Print: [facility]  Mail order or Local pharmacy [address]     Scheduled appointment if not seen by current providers in office: LOV 2/11/2022 No f/u up Scheduled at this time.   Due 6/11/2022

## 2022-04-15 RX ORDER — PRAVASTATIN SODIUM 40 MG/1
40 TABLET ORAL
Qty: 90 TABLET | Refills: 0 | Status: SHIPPED | OUTPATIENT
Start: 2022-04-15 | End: 2022-06-07 | Stop reason: SDUPTHER

## 2022-06-07 ENCOUNTER — OFFICE VISIT (OUTPATIENT)
Dept: FAMILY MEDICINE CLINIC | Age: 62
End: 2022-06-07
Payer: MEDICARE

## 2022-06-07 VITALS
SYSTOLIC BLOOD PRESSURE: 118 MMHG | OXYGEN SATURATION: 98 % | TEMPERATURE: 98.8 F | WEIGHT: 197 LBS | DIASTOLIC BLOOD PRESSURE: 76 MMHG | HEART RATE: 75 BPM | RESPIRATION RATE: 16 BRPM | BODY MASS INDEX: 26.11 KG/M2 | HEIGHT: 73 IN

## 2022-06-07 DIAGNOSIS — G89.29 CHRONIC BACK PAIN, UNSPECIFIED BACK LOCATION, UNSPECIFIED BACK PAIN LATERALITY: ICD-10-CM

## 2022-06-07 DIAGNOSIS — E78.2 MIXED HYPERLIPIDEMIA: Primary | ICD-10-CM

## 2022-06-07 DIAGNOSIS — R73.03 PREDIABETES: ICD-10-CM

## 2022-06-07 DIAGNOSIS — M54.9 CHRONIC BACK PAIN, UNSPECIFIED BACK LOCATION, UNSPECIFIED BACK PAIN LATERALITY: ICD-10-CM

## 2022-06-07 DIAGNOSIS — Z12.5 SCREENING FOR PROSTATE CANCER: ICD-10-CM

## 2022-06-07 DIAGNOSIS — R73.01 IFG (IMPAIRED FASTING GLUCOSE): ICD-10-CM

## 2022-06-07 PROCEDURE — G0463 HOSPITAL OUTPT CLINIC VISIT: HCPCS | Performed by: FAMILY MEDICINE

## 2022-06-07 PROCEDURE — G8419 CALC BMI OUT NRM PARAM NOF/U: HCPCS | Performed by: FAMILY MEDICINE

## 2022-06-07 PROCEDURE — G8432 DEP SCR NOT DOC, RNG: HCPCS | Performed by: FAMILY MEDICINE

## 2022-06-07 PROCEDURE — G8427 DOCREV CUR MEDS BY ELIG CLIN: HCPCS | Performed by: FAMILY MEDICINE

## 2022-06-07 PROCEDURE — 99214 OFFICE O/P EST MOD 30 MIN: CPT | Performed by: FAMILY MEDICINE

## 2022-06-07 PROCEDURE — 3017F COLORECTAL CA SCREEN DOC REV: CPT | Performed by: FAMILY MEDICINE

## 2022-06-07 RX ORDER — PRAVASTATIN SODIUM 40 MG/1
40 TABLET ORAL
Qty: 90 TABLET | Refills: 3 | Status: SHIPPED | OUTPATIENT
Start: 2022-06-07

## 2022-06-07 NOTE — PATIENT INSTRUCTIONS
A Healthy Lifestyle: Care Instructions  Your Care Instructions     A healthy lifestyle can help you feel good, stay at a healthy weight, and have plenty of energy for both work and play. A healthy lifestyle is something you can share with your whole family. A healthy lifestyle also can lower your risk for serious health problems, such as high blood pressure, heart disease, and diabetes. You can follow a few steps listed below to improve your health and the health of your family. Follow-up care is a key part of your treatment and safety. Be sure to make and go to all appointments, and call your doctor if you are having problems. It's also a good idea to know your test results and keep a list of the medicines you take. How can you care for yourself at home? · Do not eat too much sugar, fat, or fast foods. You can still have dessert and treats now and then. The goal is moderation. · Start small to improve your eating habits. Pay attention to portion sizes, drink less juice and soda pop, and eat more fruits and vegetables. ? Eat a healthy amount of food. A 3-ounce serving of meat, for example, is about the size of a deck of cards. Fill the rest of your plate with vegetables and whole grains. ? Limit the amount of soda and sports drinks you have every day. Drink more water when you are thirsty. ? Eat plenty of fruits and vegetables every day. Have an apple or some carrot sticks as an afternoon snack instead of a candy bar. Try to have fruits and/or vegetables at every meal.  · Make exercise part of your daily routine. You may want to start with simple activities, such as walking, bicycling, or slow swimming. Try to be active 30 to 60 minutes every day. You do not need to do all 30 to 60 minutes all at once. For example, you can exercise 3 times a day for 10 or 20 minutes.  Moderate exercise is safe for most people, but it is always a good idea to talk to your doctor before starting an exercise program.  · Keep moving. [place]iposa Pinto the lawn, work in the garden, or b[employer]obilized. Take the stairs instead of the elevator at work. · If you smoke, quit. People who smoke have an increased risk for heart attack, stroke, cancer, and other lung illnesses. Quitting is hard, but there are ways to boost your chance of quitting tobacco for good. ? Use nicotine gum, patches, or lozenges. ? Ask your doctor about stop-smoking programs and medicines. ? Keep trying. In addition to reducing your risk of diseases in the future, you will notice some benefits soon after you stop using tobacco. If you have shortness of breath or asthma symptoms, they will likely get better within a few weeks after you quit. · Limit how much alcohol you drink. Moderate amounts of alcohol (up to 2 drinks a day for men, 1 drink a day for women) are okay. But drinking too much can lead to liver problems, high blood pressure, and other health problems. Family health  If you have a family, there are many things you can do together to improve your health. · Eat meals together as a family as often as possible. · Eat healthy foods. This includes fruits, vegetables, lean meats and dairy, and whole grains. · Include your family in your fitness plan. Most people think of activities such as jogging or tennis as the way to fitness, but there are many ways you and your family can be more active. Anything that makes you breathe hard and gets your heart pumping is exercise. Here are some tips:  ? Walk to do errands or to take your child to school or the bus.  ? Go for a family bike ride after dinner instead of watching TV. Where can you learn more? Go to http://www.gray.com/  Enter A891 in the search box to learn more about \"A Healthy Lifestyle: Care Instructions. \"  Current as of: June 16, 2021               Content Version: 13.2  © 2104-1545 [employer]wise, [employer].    Care instructions adapted under license by [employer] (which disclaims liability or warranty for this information). If you have questions about a medical condition or this instruction, always ask your healthcare professional. [address] any warranty or liability for your use of this information.

## 2022-06-07 NOTE — PROGRESS NOTES
SUBJECTIVE  Chief Complaint   Patient presents with    Cholesterol Problem    Other     IFG     Here for routine follow-up. ROS - neg besides ongoing chronic back and neck pain. OBJECTIVE    Blood pressure 118/76, pulse 75, temperature 98.8 °F (37.1 °C), temperature source Temporal, resp. rate 16, height 6' 1\" (1.854 m), weight 197 lb (89.4 kg), SpO2 98 %. General:  Alert, cooperative, well appearing, in no apparent distress. CV:  The heart sounds are regular in rate and rhythm. There is a normal S1 and S2. There or no murmurs, rubs, or gallops. Distal pulses are intact and equal.  Lungs: Inspiratory and expiratory efforts are full and unlabored. Lung sounds are clear and equal to auscultation throughout all lung fields without wheezing, rales, or rhonchi. ASSESSMENT / PLAN    ICD-10-CM ICD-9-CM    1. Mixed hyperlipidemia  E78.2 272.2 pravastatin (PRAVACHOL) 40 mg tablet      CBC WITH AUTOMATED DIFF      METABOLIC PANEL, COMPREHENSIVE      LIPID PANEL   2. IFG (impaired fasting glucose)  R73.01 790.21 HEMOGLOBIN A1C W/O EAG   3. Prediabetes  R73.03 790.29    4. Chronic back pain, unspecified back location, unspecified back pain laterality  M54.9 724.5     G89.29 338.29    5. Screening for prostate cancer  Z12.5 V76.44 PSA SCREENING (SCREENING)     Hyperlipidemia - refills of meds per orders. Fasting labs ordered. IFG / prediabetes - cont A1c checks. Promote healthy lifestyle habits. Chronic low back pain - cont per specialists. Currently meds prescribed through spine center and worker's comp manages it. All chart history elements were reviewed by me at the time of the visit even though marked at time of note closure. Patient understands our medical plan. Patient has provided input and agrees with goals. Alternatives have been explained and offered. All questions answered. The patient is to call if condition worsens or fails to improve.      Follow-up and Dispositions    · Return in about 8 months (around 2/7/2023) for annual medicare wellness / routine care and fasting labs to be completed 3-5 days .

## 2022-06-07 NOTE — PROGRESS NOTES
Chief Complaint   Patient presents with    Cholesterol Problem    Other     IFG         1. \"Have you been to the ER, urgent care clinic since your last visit? Hospitalized since your last visit? \" No    2. \"Have you seen or consulted any other health care providers outside of the [address] [place] [address] since your last visit? \" No     3. For patients aged 39-70: Has the patient had a colonoscopy / FIT/ Cologuard? Yes - no Care Gap present      If the patient is female:    4. For patients aged 41-77: Has the patient had a mammogram within the past 2 years? NA - based on age or sex      11. For patients aged 21-65: Has the patient had a pap smear?  NA - based on age or sex

## 2022-08-10 ENCOUNTER — OFFICE VISIT (OUTPATIENT)
Dept: ORTHOPEDIC SURGERY | Age: 62
End: 2022-08-10
Payer: OTHER MISCELLANEOUS

## 2022-08-10 VITALS — TEMPERATURE: 97.7 F | WEIGHT: 199 LBS | BODY MASS INDEX: 26.25 KG/M2 | HEART RATE: 68 BPM | OXYGEN SATURATION: 97 %

## 2022-08-10 DIAGNOSIS — M54.59 MECHANICAL LOW BACK PAIN: Primary | ICD-10-CM

## 2022-08-10 DIAGNOSIS — M47.812 CERVICAL SPONDYLOSIS: ICD-10-CM

## 2022-08-10 DIAGNOSIS — M62.838 MUSCLE SPASM: ICD-10-CM

## 2022-08-10 DIAGNOSIS — M96.1 LUMBAR POST-LAMINECTOMY SYNDROME: ICD-10-CM

## 2022-08-10 PROCEDURE — 99213 OFFICE O/P EST LOW 20 MIN: CPT | Performed by: NURSE PRACTITIONER

## 2022-08-10 RX ORDER — CYCLOBENZAPRINE HCL 10 MG
20 TABLET ORAL
Qty: 60 TABLET | Refills: 5 | Status: SHIPPED | OUTPATIENT
Start: 2022-08-10

## 2022-08-10 NOTE — PROGRESS NOTES
Chief complaint   Chief Complaint   Patient presents with    Follow-up       History of Present Illness:  Eliseo Briones is a  57 y.o.  male who comes in today for follow-up of his chronic neck and back pain. He has had both cervical and lumbar fusion in the past.  He states he is doing about the same. He states he is really not any better but is not any worse. He is able to do activities around his house. Patient states he still gets back pain without leg pain. It is increased with activity. The Flexeril 20 mg at night still helps his neck pain and headaches. He states it enables him to sleep well in the night helps his back pain. He does not have to take it every night but when he does it does help. He does do a home exercise program.  He denies fever bowel bladder dysfunction. He is still waiting for a right rotator cuff repair to be approved by [employer] Physical Exam: The patient is a 69-year-old male well-developed well-nourished who is alert and oriented with a normal mood and affect. He has a full weightbearing antalgic gait. He has 5 5 strength bilateral upper and lower extremities. Negative straight leg raise. Negative Yuliya's. He has pain with hyperextension lumbar spine. Assessment and Plan: This is a patient who has post lumbar laminectomy syndrome lumbar and cervical spondylosis I refilled his Flexeril to 20 mg at night. We will see him back in 6-month sooner if needed. Medications:  Current Outpatient Medications   Medication Sig Dispense Refill    cyclobenzaprine (FLEXERIL) 10 mg tablet Take 2 Tablets by mouth nightly. Indications: muscle spasm 60 Tablet 5    pravastatin (PRAVACHOL) 40 mg tablet Take 1 Tablet by mouth nightly. 90 Tablet 3    aspirin-acetaminophen-caffeine (Excedrin Migraine) 250-250-65 mg per tablet Take 1 Tab by mouth. ascorbic acid, vitamin C, (VITAMIN C) 1,000 mg tablet Take 1,000 mg by mouth daily.       vitamin E (AQUA GEMS) 400 unit capsule Take 400 Units by mouth daily. melatonin 5 mg cap capsule Take 5 mg by mouth nightly. 10 mg (Patient not taking: Reported on 8/10/2022)             Review of systems:    Past Medical History:   Diagnosis Date    Generalized osteoarthrosis, involving multiple sites     Lumbar postlaminectomy syndrome     Mixed hyperlipidemia     S/P colonoscopy 2018    follow-up 5 years    Spinal cord stimulator status     placed and removed due to little benefit     Past Surgical History:   Procedure Laterality Date    HX CERVICAL FUSION  2003    C4/5    HX COLONOSCOPY  12/2012; 1/2018    polyps, diverticula; 2nd colo neg    HX LUMBAR FUSION      L3-sacrum fused, reports 5 lumbar sx, Stas Ospina and Arpita Vyas SHOULDER ARTHROSCOPY Right 01/2017    Dr. Jian Owens TONSIL AND ADENOIDECTOMY       Social History     Socioeconomic History    Marital status: SINGLE     Spouse name: Not on file    Number of children: Not on file    Years of education: Not on file    Highest education level: Not on file   Occupational History    Occupation: retired   Tobacco Use    Smoking status: Never    Smokeless tobacco: Former   Vaping Use    Vaping Use: Never used   Substance and Sexual Activity    Alcohol use:  Yes     Alcohol/week: 1.0 standard drink     Types: 1 Standard drinks or equivalent per week     Comment: Rare    Drug use: Never    Sexual activity: Yes     Partners: Female   Other Topics Concern     Service Not Asked    Blood Transfusions Not Asked    Caffeine Concern Not Asked    Occupational Exposure Not Asked    Hobby Hazards Not Asked    Sleep Concern Not Asked    Stress Concern Not Asked    Weight Concern Not Asked    Special Diet Not Asked    Back Care Not Asked    Exercise Not Asked    Bike Helmet Not Asked    Seat Belt Not Asked    Self-Exams Not Asked   Social History Narrative    Not on file     Social Determinants of Health     Financial Resource Strain: Not on file   Food Insecurity: Not on file Transportation Needs: Not on file   Physical Activity: Not on file   Stress: Not on file   Social Connections: Not on file   Intimate Partner Violence: Not on file   Housing Stability: Not on file     Family History   Problem Relation Age of Onset    Cancer Sister     Diabetes Neg Hx     Heart Disease Neg Hx     Hypertension Neg Hx     Stroke Neg Hx        Physical Exam:  Visit Vitals  Pulse 68   Temp 97.7 °F (36.5 °C) (Temporal)   Wt 199 lb (90.3 kg)   SpO2 97%   BMI 26.25 kg/m²     Pain Scale: 4/10       has been . reviewed and is appropriate          Diagnoses and all orders for this visit:    1. Mechanical low back pain  -     cyclobenzaprine (FLEXERIL) 10 mg tablet; Take 2 Tablets by mouth nightly. Indications: muscle spasm    2. Muscle spasm  -     cyclobenzaprine (FLEXERIL) 10 mg tablet; Take 2 Tablets by mouth nightly. Indications: muscle spasm    3. Lumbar post-laminectomy syndrome  -     cyclobenzaprine (FLEXERIL) 10 mg tablet; Take 2 Tablets by mouth nightly. Indications: muscle spasm    4. Cervical spondylosis  -     cyclobenzaprine (FLEXERIL) 10 mg tablet; Take 2 Tablets by mouth nightly. Indications: muscle spasm          Follow-up and Dispositions    Return in about 6 months (around 2/10/2023) for with STARLA Alcazar.              We have informed Aileen Montes De Oca to notify us for immediate appointment if he has any worsening neurogical symptoms or if an emergency situation presents, then call [phone]

## 2022-10-21 ENCOUNTER — HOSPITAL ENCOUNTER (OUTPATIENT)
Dept: GENERAL RADIOLOGY | Age: 62
Discharge: HOME | End: 2022-10-21
Payer: OTHER MISCELLANEOUS

## 2022-10-21 DIAGNOSIS — M96.1 POST LAMINECTOMY SYNDROME: ICD-10-CM

## 2022-10-21 PROCEDURE — 72110 X-RAY EXAM L-2 SPINE 4/>VWS: CPT

## 2023-02-04 ENCOUNTER — HOSPITAL ENCOUNTER (OUTPATIENT)
Dept: LAB | Age: 63
Discharge: HOME | End: 2023-02-04
Payer: MEDICARE

## 2023-02-04 DIAGNOSIS — E78.2 MIXED HYPERLIPIDEMIA: ICD-10-CM

## 2023-02-04 DIAGNOSIS — Z12.5 SCREENING FOR PROSTATE CANCER: ICD-10-CM

## 2023-02-04 DIAGNOSIS — R73.01 IFG (IMPAIRED FASTING GLUCOSE): ICD-10-CM

## 2023-02-04 LAB
ALBUMIN SERPL-MCNC: 3.9 G/DL (ref 3.4–5)
ALBUMIN/GLOB SERPL: 1.3 (ref 0.8–1.7)
ALP SERPL-CCNC: 96 U/L (ref 45–117)
ALT SERPL-CCNC: 31 U/L (ref 16–61)
ANION GAP SERPL CALC-SCNC: 5 MMOL/L (ref 3–18)
AST SERPL-CCNC: 21 U/L (ref 10–38)
BASOPHILS # BLD: 0.1 K/UL (ref 0–0.1)
BASOPHILS NFR BLD: 1 % (ref 0–2)
BILIRUB SERPL-MCNC: 0.5 MG/DL (ref 0.2–1)
BUN SERPL-MCNC: 12 MG/DL (ref 7–18)
BUN/CREAT SERPL: 12 (ref 12–20)
CALCIUM SERPL-MCNC: 9.1 MG/DL (ref 8.5–10.1)
CHLORIDE SERPL-SCNC: 105 MMOL/L (ref 100–111)
CHOLEST SERPL-MCNC: 151 MG/DL
CO2 SERPL-SCNC: 27 MMOL/L (ref 21–32)
CREAT SERPL-MCNC: 1.01 MG/DL (ref 0.6–1.3)
DIFFERENTIAL METHOD BLD: NORMAL
EOSINOPHIL # BLD: 0.2 K/UL (ref 0–0.4)
EOSINOPHIL NFR BLD: 2 % (ref 0–5)
ERYTHROCYTE [DISTWIDTH] IN BLOOD BY AUTOMATED COUNT: 12.6 % (ref 11.6–14.5)
GLOBULIN SER CALC-MCNC: 2.9 G/DL (ref 2–4)
GLUCOSE SERPL-MCNC: 125 MG/DL (ref 74–99)
HBA1C MFR BLD: 5.7 % (ref 4.2–5.6)
HCT VFR BLD AUTO: 47.8 % (ref 36–48)
HDLC SERPL-MCNC: 47 MG/DL (ref 40–60)
HDLC SERPL: 3.2 (ref 0–5)
HGB BLD-MCNC: 15.7 G/DL (ref 13–16)
IMM GRANULOCYTES # BLD AUTO: 0 K/UL (ref 0–0.04)
IMM GRANULOCYTES NFR BLD AUTO: 0 % (ref 0–0.5)
LDLC SERPL CALC-MCNC: 79.6 MG/DL (ref 0–100)
LIPID PROFILE,FLP: NORMAL
LYMPHOCYTES # BLD: 2.8 K/UL (ref 0.9–3.6)
LYMPHOCYTES NFR BLD: 28 % (ref 21–52)
MCH RBC QN AUTO: 31 PG (ref 24–34)
MCHC RBC AUTO-ENTMCNC: 32.8 G/DL (ref 31–37)
MCV RBC AUTO: 94.5 FL (ref 78–100)
MONOCYTES # BLD: 0.8 K/UL (ref 0.05–1.2)
MONOCYTES NFR BLD: 8 % (ref 3–10)
NEUTS SEG # BLD: 6.1 K/UL (ref 1.8–8)
NEUTS SEG NFR BLD: 61 % (ref 40–73)
NRBC # BLD: 0 K/UL (ref 0–0.01)
NRBC BLD-RTO: 0 PER 100 WBC
PLATELET # BLD AUTO: 297 K/UL (ref 135–420)
PMV BLD AUTO: 9.6 FL (ref 9.2–11.8)
POTASSIUM SERPL-SCNC: 4.3 MMOL/L (ref 3.5–5.5)
PROT SERPL-MCNC: 6.8 G/DL (ref 6.4–8.2)
PSA SERPL-MCNC: 0.9 NG/ML (ref 0–4)
RBC # BLD AUTO: 5.06 M/UL (ref 4.35–5.65)
SODIUM SERPL-SCNC: 137 MMOL/L (ref 136–145)
TRIGL SERPL-MCNC: 122 MG/DL (ref ?–150)
VLDLC SERPL CALC-MCNC: 24.4 MG/DL
WBC # BLD AUTO: 10 K/UL (ref 4.6–13.2)

## 2023-02-04 PROCEDURE — 36415 COLL VENOUS BLD VENIPUNCTURE: CPT

## 2023-02-04 PROCEDURE — 84153 ASSAY OF PSA TOTAL: CPT

## 2023-02-04 PROCEDURE — 80053 COMPREHEN METABOLIC PANEL: CPT

## 2023-02-04 PROCEDURE — 83036 HEMOGLOBIN GLYCOSYLATED A1C: CPT

## 2023-02-04 PROCEDURE — 85025 COMPLETE CBC W/AUTO DIFF WBC: CPT

## 2023-02-04 PROCEDURE — 80061 LIPID PANEL: CPT

## 2023-02-06 NOTE — PROGRESS NOTES
1. \"Have you been to the ER, urgent care clinic since your last visit? Hospitalized since your last visit? \" Yes Where: [facility] for rib injury     2. \"Have you seen or consulted any other health care providers outside of the [address] [place] [address] since your last visit? \" Yes Where: Dr. Angelic Lopez      3. For patients aged 39-70: Has the patient had a colonoscopy / FIT/ Cologuard? No      If the patient is female:    4. For patients aged 41-77: Has the patient had a mammogram within the past 2 years? NA - based on age or sex      11. For patients aged 21-65: Has the patient had a pap smear?  NA - based on age or sex

## 2023-02-07 ENCOUNTER — OFFICE VISIT (OUTPATIENT)
Dept: FAMILY MEDICINE CLINIC | Age: 63
End: 2023-02-07
Payer: MEDICARE

## 2023-02-07 VITALS
BODY MASS INDEX: 25.71 KG/M2 | OXYGEN SATURATION: 96 % | BODY MASS INDEX: 25.71 KG/M2 | RESPIRATION RATE: 14 BRPM | TEMPERATURE: 97.8 F | HEIGHT: 73 IN | HEIGHT: 73 IN | SYSTOLIC BLOOD PRESSURE: 132 MMHG | WEIGHT: 194 LBS | WEIGHT: 194 LBS | RESPIRATION RATE: 14 BRPM | HEART RATE: 100 BPM | DIASTOLIC BLOOD PRESSURE: 80 MMHG | TEMPERATURE: 97.8 F | DIASTOLIC BLOOD PRESSURE: 80 MMHG | SYSTOLIC BLOOD PRESSURE: 132 MMHG | HEART RATE: 100 BPM

## 2023-02-07 DIAGNOSIS — Z00.00 INITIAL MEDICARE ANNUAL WELLNESS VISIT: Primary | ICD-10-CM

## 2023-02-07 DIAGNOSIS — R73.03 PREDIABETES: ICD-10-CM

## 2023-02-07 DIAGNOSIS — S29.9XXA RIB INJURY: Primary | ICD-10-CM

## 2023-02-07 DIAGNOSIS — E78.2 MIXED HYPERLIPIDEMIA: ICD-10-CM

## 2023-02-07 DIAGNOSIS — G89.29 CHRONIC BACK PAIN, UNSPECIFIED BACK LOCATION, UNSPECIFIED BACK PAIN LATERALITY: ICD-10-CM

## 2023-02-07 DIAGNOSIS — M54.9 CHRONIC BACK PAIN, UNSPECIFIED BACK LOCATION, UNSPECIFIED BACK PAIN LATERALITY: ICD-10-CM

## 2023-02-07 DIAGNOSIS — Z71.89 ADVANCED DIRECTIVES, COUNSELING/DISCUSSION: ICD-10-CM

## 2023-02-07 DIAGNOSIS — R73.01 IFG (IMPAIRED FASTING GLUCOSE): ICD-10-CM

## 2023-02-07 PROCEDURE — G0463 HOSPITAL OUTPT CLINIC VISIT: HCPCS | Performed by: FAMILY MEDICINE

## 2023-02-07 PROCEDURE — 99497 ADVNCD CARE PLAN 30 MIN: CPT | Performed by: FAMILY MEDICINE

## 2023-02-07 RX ORDER — PRAVASTATIN SODIUM 40 MG/1
40 TABLET ORAL
Qty: 90 TABLET | Refills: 3 | Status: SHIPPED | OUTPATIENT
Start: 2023-02-07

## 2023-02-07 NOTE — ACP (ADVANCE CARE PLANNING)
Advance Care Planning     General Advance Care Planning (ACP) Conversation      Date of Conversation: 2/7/2023  Conducted with: Patient with Decision Making Capacity    Healthcare Decision Maker:   No healthcare agent documented. He is not . Lives with GF. Has grown biological children. Today we referred to ACP Clinical Specialist for assistance. Content/Action Overview:   Has NO ACP documents/care preferences - refer to ACP Clinical Specialist  Reviewed DNR/DNI and patient elects Full Code (Attempt Resuscitation)  Topics discussed: treatment goals, benefit/burden of treatment options, ventilation preferences, resuscitation preferences, and he has not given this much though. Has recently prepared a will but not a living will.       Length of Voluntary ACP Conversation in minutes:  16 minutes    Tianna Lujan MD

## 2023-02-07 NOTE — PROGRESS NOTES
Chief Complaint   Patient presents with    Annual Wellness Visit     3 most recent PHQ Screens 2/7/2023   Little interest or pleasure in doing things Not at all   Feeling down, depressed, irritable, or hopeless Not at all   Total Score PHQ 2 0   Trouble falling or staying asleep, or sleeping too much Not at all   Feeling tired or having little energy Not at all   Poor appetite, weight loss, or overeating Not at all   Feeling bad about yourself - or that you are a failure or have let yourself or your family down Not at all   Trouble concentrating on things such as school, work, reading, or watching TV Not at all   Moving or speaking so slowly that other people could have noticed; or the opposite being so fidgety that others notice Several days   Thoughts of being better off dead, or hurting yourself in some way Not at all   PHQ 9 Score 1   How difficult have these problems made it for you to do your work, take care of your home and get along with others Not difficult at all     Abuse Screening Questionnaire 2/7/2023   Do you ever feel afraid of your partner? N   Are you in a relationship with someone who physically or mentally threatens you? N   Is it safe for you to go home? Y     Fall Risk Assessment, last 12 mths 2/7/2023   Able to walk? Yes   Fall in past 12 months? 0   Do you feel unsteady? 0   Are you worried about falling 0       1. \"Have you been to the ER, urgent care clinic since your last visit? Hospitalized since your last visit? \" Yes Where: [facility] for rib injury      2. \"Have you seen or consulted any other health care providers outside of the [address] [place] [address] since your last visit? \" Yes Where: Dr. Satish Ortega       3. For patients aged 39-70: Has the patient had a colonoscopy / FIT/ Cologuard? No        If the patient is female:     4. For patients aged 41-77: Has the patient had a mammogram within the past 2 years? NA - based on age or sex        11.  For patients aged 21-65: Has the patient had a pap smear?  NA - based on age or sex

## 2023-02-07 NOTE — PROGRESS NOTES
SUBJECTIVE  Chief Complaint   Patient presents with    Rib Pain     Left sided     Here for routine follow-up. Had a rib injury on the left in Dec 2022. He still has residual pain. He was evaluated by another provider outside of this office and was told he did not fracture any ribs. He did feel a popping sensation. Has mild occasional pain with rotation and to touch. Has had low back pain as well and recently saw his surgeon. He is under the care of the spine center and takes flexeril as needed. Taking cholesterol med without side effects. ROS - neg other than above. OBJECTIVE    Blood pressure 132/80, pulse 100, temperature 97.8 °F (36.6 °C), temperature source Temporal, resp. rate 14, height 6' 1\" (1.854 m), weight 194 lb (88 kg). General:  Alert, cooperative, well appearing, in no apparent distress. HEENT:  no oral mucosal lesions. Neck: no masses. CV:  The heart sounds are regular in rate and rhythm. There is a normal S1 and S2. There or no murmurs. Tenderness of left lateral lower rib wall. No deformity. Lungs: Inspiratory and expiratory efforts are full and unlabored. Lung sounds are clear and equal to auscultation throughout all lung fields without wheezing, rales, or rhonchi. Abd: soft, nontender, no masses. No HSM. Ext: no swelling. Psych: normal affect. Mood good. Oriented x 3. Judgement and insight intact. ASSESSMENT / PLAN    ICD-10-CM ICD-9-CM    1. Rib injury  S29. 9XXA 959.11       2. Mixed hyperlipidemia  E78.2 272.2 pravastatin (PRAVACHOL) 40 mg tablet      3. IFG (impaired fasting glucose)  R73.01 790.21       4. Prediabetes  R73.03 790.29       5. Chronic back pain, unspecified back location, unspecified back pain laterality  M54.9 724.5     G89.29 338.29         Rib injury - usually can be slow to completely resolve. He will continue to monitor. Hyperlipidemia - refills of meds per orders. Fasting labs ordered.     IFG / prediabetes - cont A1c checks annually. Promote healthy lifestyle habits. Educated on carbs and carb reduction. Chronic low back pain - cont per specialists. Currently meds prescribed through spine center and worker's comp manages it. All chart history elements were reviewed by me at the time of the visit even though marked at time of note closure. Patient understands our medical plan. Patient has provided input and agrees with goals. Alternatives have been explained and offered. All questions answered. The patient is to call if condition worsens or fails to improve.      Follow-up and Dispositions    Return in about 1 year (around 2/7/2024) for routine care and Medicare Wellness exam.

## 2023-02-07 NOTE — PATIENT INSTRUCTIONS
Medicare Wellness Visit, Male    The best way to live healthy is to have a lifestyle where you eat a well-balanced diet, exercise regularly, limit alcohol use, and quit all forms of tobacco/nicotine, if applicable. Regular preventive services are another way to keep healthy. Preventive services (vaccines, screening tests, monitoring & exams) can help personalize your care plan, which helps you manage your own care. Screening tests can find health problems at the earliest stages, when they are easiest to treat. [facility] follows the current, evidence-based guidelines published by the [facility] ([facility]) when recommending preventive services for our patients. Because we follow these guidelines, sometimes recommendations change over time as research supports it. (For example, a prostate screening blood test is no longer routinely recommended for men with no symptoms). Of course, you and your doctor may decide to screen more often for some diseases, based on your risk and co-morbidities (chronic disease you are already diagnosed with). Preventive services for you include:  - Medicare offers their members a free annual wellness visit, which is time for you and your primary care provider to discuss and plan for your preventive service needs.  Take advantage of this benefit every year!    -Over the age of 72 should receive the recommended pneumonia vaccines.   -All adults should have a flu vaccine yearly.  -All adults should receive a tetanus vaccine every 10 years.  -Over the age of 48 should receive the shingles vaccines.    -All adults should be screened once for Hepatitis C.  -All adults age 38-68 who are overweight should have a diabetes screening test once every three years.   -Other screening tests & preventive services for persons with diabetes include: an eye exam to screen for diabetic retinopathy, a kidney function test, a foot exam, and stricter control over your cholesterol.   -Cardiovascular screening for adults with routine risk involves an electrocardiogram (ECG) at intervals determined by the provider.     -Colorectal cancer screening should be done for adults age 43-69 with no increased risk factors for colorectal cancer. There are a number of acceptable methods of screening for this type of cancer. Each test has its own benefits and drawbacks. Discuss with your provider what is most appropriate for you during your annual wellness visit. The different tests include: colonoscopy (considered the best screening method), a fecal occult blood test, a fecal DNA test, and sigmoidoscopy.    -Lung cancer screening is recommended annually with a low dose CT scan for adults between age 54 and 68, who have smoked at least 30 pack years (equivalent of 1 pack per day for 30 days), and who is a current smoker or quit less than 15 years ago. -An Abdominal Aortic Aneurysm (AAA) Screening is recommended for men age 73-68 who has ever smoked in their lifetime.      Here is a list of your current Health Maintenance items (your personalized list of preventive services) with a due date:  Health Maintenance Due   Topic Date Due    Shingles Vaccine (1 of 2) Never done    COVID-19 Vaccine (4 - Booster for Moderna series) 01/25/2022    DTaP/Tdap/Td  (2 - Td or Tdap) 08/13/2022    Colorectal Screening  01/03/2023    Depresssion Screening  02/11/2023

## 2023-02-07 NOTE — PROGRESS NOTES
Chief Complaint   Patient presents with    Annual Wellness Visit     This is an Initial Medicare Annual Wellness Exam (AWV) (Performed 12 months after IPPE or effective date of Medicare Part B enrollment, Once in a lifetime)    I have reviewed the patient's medical history in detail and updated the computerized patient record. Assessment/Plan   Education and counseling provided:  Are appropriate based on today's review and evaluation    1. Initial Medicare annual wellness visit  2. Advanced directives, counseling/discussion  -     ADVANCE CARE PLANNING FIRST 27 MINS  -     REFERRAL TO ACP CLINICAL SPECIALIST     Age and sex specific counseling. Screening for depression and alcoholism. Advanced directives / end of life planning discussion - see ACP note. Care team updated. Advised on shingles vaccine. Medicare recommended screening and prevention test table is filled out, reviewed, and provided to patient. Due for CRCS. Contact information provided. Patient understands our medical plan. Patient has provided input and agrees with goals. All questions answered.      Depression Risk Factor Screening     3 most recent PHQ Screens 2/7/2023   Little interest or pleasure in doing things Not at all   Feeling down, depressed, irritable, or hopeless Not at all   Total Score PHQ 2 0   Trouble falling or staying asleep, or sleeping too much Not at all   Feeling tired or having little energy Not at all   Poor appetite, weight loss, or overeating Not at all   Feeling bad about yourself - or that you are a failure or have let yourself or your family down Not at all   Trouble concentrating on things such as school, work, reading, or watching TV Not at all   Moving or speaking so slowly that other people could have noticed; or the opposite being so fidgety that others notice Several days   Thoughts of being better off dead, or hurting yourself in some way Not at all   PHQ 9 Score 1   How difficult have these problems made it for you to do your work, take care of your home and get along with others Not difficult at all       Alcohol & Drug Abuse Risk Screen    Do you average more than 2 drinks per night or 14 drinks a week: No    On any one occasion in the past three months have you have had more than 4 drinks containing alcohol:  No          Functional Ability and Level of Safety    Hearing:  was given hearing aides from [employer] and was told he has 30% hearing loss     Activities of Daily Living: The home contains: no safety equipment. Patient does total self care     Ambulation: with no difficulty      Fall Risk:  Fall Risk Assessment, last 12 mths 2/7/2023   Able to walk? Yes   Fall in past 12 months? 0   Do you feel unsteady?  0   Are you worried about falling 0      Abuse Screen:  Patient is not abused       Cognitive Screening    Has your family/caregiver stated any concerns about your memory: no      Health Maintenance Due     Health Maintenance Due   Topic Date Due    Shingles Vaccine (1 of 2) Never done    COVID-19 Vaccine (4 - Booster for Moderna series) 01/25/2022    DTaP/Tdap/Td series (2 - Td or Tdap) 08/13/2022    Colorectal Cancer Screening Combo  01/03/2023    Depression Screen  02/11/2023       Patient Care Team   Patient Care Team:  Jan Orosco MD as PCP - General (Family Medicine)  Jan Orosco MD as PCP - REHABILITATION [facility] Empaneled Provider  Hermlio Dey NP (Orthopedic Surgery)  Lin Davis MD (Gastroenterology)  Lazarus Dirk, MD (Orthopedic Surgery)    History     Patient Active Problem List   Diagnosis Code    Generalized osteoarthrosis, involving multiple sites M15.9    Mixed hyperlipidemia E78.2    Myofascial muscle pain M79.18    DDD (degenerative disc disease), cervical M50.30    Lumbar post-laminectomy syndrome M96.1    IGT (impaired glucose tolerance) R73.02     Past Medical History:   Diagnosis Date    Generalized osteoarthrosis, involving multiple sites     Lumbar postlaminectomy syndrome     Mixed hyperlipidemia     S/P colonoscopy 2018    follow-up 5 years    Spinal cord stimulator status     placed and removed due to little benefit      Past Surgical History:   Procedure Laterality Date    HX CERVICAL FUSION  2003    C4/5    HX COLONOSCOPY  12/2012; 1/2018    polyps, diverticula; 2nd colo neg    HX LUMBAR FUSION      L3-sacrum fused, reports 5 lumbar sx, Stas Bo and Vamshi Rubio SHOULDER ARTHROSCOPY Right 01/2017    Dr. Mercer Lefort TONSIL AND ADENOIDECTOMY       Current Outpatient Medications   Medication Sig Dispense Refill    pravastatin (PRAVACHOL) 40 mg tablet Take 1 Tablet by mouth nightly. 90 Tablet 3    cyclobenzaprine (FLEXERIL) 10 mg tablet Take 2 Tablets by mouth nightly. Indications: muscle spasm 60 Tablet 5    aspirin-acetaminophen-caffeine (Excedrin Migraine) 250-250-65 mg per tablet Take 1 Tab by mouth. ascorbic acid, vitamin C, (VITAMIN C) 1,000 mg tablet Take 1,000 mg by mouth daily. vitamin E (AQUA GEMS) 400 unit capsule Take 400 Units by mouth daily. melatonin 5 mg cap capsule Take 5 mg by mouth nightly. 10 mg       Allergies   Allergen Reactions    Morphine Other (comments)     Hallucinations    Percocet [Oxycodone-Acetaminophen] Other (comments)     Patients states he doesn't like weaning off of them. Family History   Problem Relation Age of Onset    Cancer Sister     Diabetes Neg Hx     Heart Disease Neg Hx     Hypertension Neg Hx     Stroke Neg Hx      Social History     Tobacco Use    Smoking status: Never    Smokeless tobacco: Former   Substance Use Topics    Alcohol use:  Yes     Alcohol/week: 1.0 standard drink     Types: 1 Standard drinks or equivalent per week     Comment: Jonelle Tay MD

## 2023-02-08 ENCOUNTER — PATIENT OUTREACH (OUTPATIENT)
Dept: CASE MANAGEMENT | Age: 63
End: 2023-02-08

## 2023-02-08 NOTE — ACP (ADVANCE CARE PLANNING)
Advance Care Planning   Ambulatory ACP Specialist Patient Outreach    Date:  2/8/2023    ACP Specialist:  Mariana Cobb    Outreach call to patient in follow-up to ACP Specialist referral from:  La Bowman MD    [x] PCP  [] Provider   [] Ambulatory Care Management [] Other     For:                  [x] Advance Directive Assistance              [] Complete Portable DNR order              [] Complete POST/MOST              [] Code Status Discussion             [] Discuss Goals of Care             [] Early ACP Decision-Making              [] Other (Specify)    Date Referral Received:  2/7/2023    Today's Outreach:  [x] First   [] Second  [] Third       Third outreach made by: [] Phone  [] Email / mail    [] [place]     Intervention:  [x] Spoke with Patient   [] Left VM requesting return call      Outcome:  Patient agreeable to a conversation with ACP Specialist Prema Bernabe on Thursday, February 16, 2023 at 9:00 AM.    A copy of [facility] AMD and ACP information sheets mailed to patient's confirmed home address on file. Next Step:   [x] ACP scheduled conversation  [] Outreach again in one week               [x] Email / Mail ACP Info Sheets  [x] Email / Mail Advance Directive   [] Closing referral.  Routing closure to referring provider/staff and to ACP Specialist . [] Closure letter mailed to patient with invitation to contact ACP Specialist if / when ready.   Thank you for this referral.

## 2023-02-16 ENCOUNTER — CLINICAL DOCUMENTATION (OUTPATIENT)
Dept: CASE MANAGEMENT | Age: 63
End: 2023-02-16

## 2023-02-16 NOTE — ACP (ADVANCE CARE PLANNING)
Advance Care Planning     Advance Care Planning Clinical Specialist  Conversation Note      Date of ACP Conversation: 2/16/2023    Conversation Conducted with: Patient with Decision Making Capacity    ACP Clinical Specialist: Cyndi Rivas LCSW    Healthcare Decision Maker:     Current Designated Healthcare Decision Maker:   Pt not ready to provide information about his health care decision makers. He stated that he would end up making his 2 sons his primary and secondary HCDM but wanted to review the [facility] before providing this information. Care Preferences    Hospitalization: \"If your health worsens and it becomes clear that your chance of recovery is unlikely, what would your preference be regarding hospitalization? \"    Choice:  [] The patient wants hospitalization. [x] The patient prefers comfort-focused treatment without hospitalization. Ventilation: \"If you were in your present state of health and suddenly became very ill and were unable to breathe on your own, what would your preference be about the use of a ventilator (breathing machine) if it were available to you? \"      If the patient would desire the use of ventilator (breathing machine), answer \"yes\". If not, \"no\": yes    \"If your health worsens and it becomes clear that your chance of recovery is unlikely, what would your preference be about the use of a ventilator (breathing machine) if it were available to you? \"     Would the patient desire the use of ventilator (breathing machine)?: No      Resuscitation  \"CPR works best to restart the heart when there is a sudden event, like a heart attack, in someone who is otherwise healthy. Unfortunately, CPR does not typically restart the heart for people who have serious health conditions or who are very sick. \"    \"In the event your heart stopped as a result of an underlying serious health condition, would you want attempts to be made to restart your heart (answer \"yes\" for attempt to resuscitate) or would you prefer a natural death (answer \"no\" for do not attempt to resuscitate)? \" yes       [x] Yes   [] No   Educated Patient / Valetta Cons regarding differences between Advance Directives and portable DNR orders. Length of ACP Conversation in minutes:  39    Conversation Outcomes:  [x] ACP discussion completed  [] Existing advance directive reviewed with patient; no changes to patient's previously recorded wishes  [] New Advance Directive completed  [] Portable Do Not Rescitate prepared for Provider review and signature  [] POLST/POST/MOLST/MOST prepared for Provider review and signature      Follow-up plan:    [x] Schedule follow-up conversation to continue planning - patient to call once he received the [facility] AMD in the mail and reviews to set up a new appointment to complete the [facility] AMD  [] Referred individual to Provider for additional questions/concerns   [] Advised patient/agent/surrogate to review completed ACP document and update if needed with changes in condition, patient preferences or care setting    [x] This note routed to one or more involved healthcare providers    ACP Specialist met with patient for scheduled ACP appointment to discuss Advance Care Planning. Pt presented as alert and oriented throughout this entire conversation and has been consistent with decisions and medical wishes. Pt stated that he was not ready to complete a [facility] AMD at this time as he wanted to review the blank document and also review a recent will that was drawn up for him by his children before proceeding with completing an AMD with this ACP Specialist.    Pt did speak with this ACP Specialist about his medical wishes around the use of a ventilator, CPR and hospitalization. Pt reports that he would want the use of a ventilator and CPR in his current condition. He would not want the use of a ventilator if his health had declined to the point of no hope for recovery.   Pt states that he would prefer comfort focused care without hospitalization if his health declined to the point of no hope for recovery. Pt stated \"I would not want to die in the hospital. I would want to be taken home, provided drugs for comfort and let me die\". Pt stated that he has thought about who he would want to make medical decision for him if he was unable to do so. He stated \"I am choosing my two boys to be my decision makers and they already know exactly what I want\". Pt did not wish to provide information about his sons at this time. Pt reports that he just recently received a will that his sons had gotten drawn up for him and thinks the will may have the information regarding his decision makers and his medical wishes. Pt wanted to review the will and review the blank copy of the [facility] AMD before proceeding with providing information that would be placed in the document to this ACP Specialist.      Plan: Pt was provided this ACP Specialist contact number to call back after reviewing the documents and plans to set up another appointment to complete the [facility] AMD if needed. ACP Specialist will follow up with patient again in 2 weeks to see if he has received all the information needed to make a decision about proceeding with completing a VA AMD with this Barrie 6 M.  Claudeen Dama, LISW-CP  Advance Care   [facility]  [phone]

## 2023-02-22 ENCOUNTER — OFFICE VISIT (OUTPATIENT)
Age: 63
End: 2023-02-22

## 2023-02-22 VITALS
HEIGHT: 73 IN | HEART RATE: 77 BPM | BODY MASS INDEX: 25.98 KG/M2 | WEIGHT: 196 LBS | SYSTOLIC BLOOD PRESSURE: 122 MMHG | TEMPERATURE: 98.1 F | OXYGEN SATURATION: 98 % | DIASTOLIC BLOOD PRESSURE: 80 MMHG

## 2023-02-22 DIAGNOSIS — M96.1 POSTLAMINECTOMY SYNDROME, NOT ELSEWHERE CLASSIFIED: Primary | ICD-10-CM

## 2023-02-22 DIAGNOSIS — M54.2 NECK PAIN: ICD-10-CM

## 2023-02-22 DIAGNOSIS — M62.838 OTHER MUSCLE SPASM: ICD-10-CM

## 2023-02-22 DIAGNOSIS — M47.812 SPONDYLOSIS WITHOUT MYELOPATHY OR RADICULOPATHY, CERVICAL REGION: ICD-10-CM

## 2023-02-22 RX ORDER — CYCLOBENZAPRINE HCL 10 MG
20 TABLET ORAL
Qty: 180 TABLET | Refills: 1 | Status: SHIPPED | OUTPATIENT
Start: 2023-02-22

## 2023-02-22 ASSESSMENT — PATIENT HEALTH QUESTIONNAIRE - PHQ9
2. FEELING DOWN, DEPRESSED OR HOPELESS: 0
SUM OF ALL RESPONSES TO PHQ9 QUESTIONS 1 & 2: 0
SUM OF ALL RESPONSES TO PHQ QUESTIONS 1-9: 0
1. LITTLE INTEREST OR PLEASURE IN DOING THINGS: 0
SUM OF ALL RESPONSES TO PHQ QUESTIONS 1-9: 0

## 2023-02-22 NOTE — PROGRESS NOTES
Chief complaint   Chief Complaint   Patient presents with    Neck Pain       History of Present Illness:  Grecia Rhoades is a  60 y.o.  male  who comes in today for follow-up of his chronic neck and back pain. He has had both cervical and lumbar fusion in the past.  He states he is doing okay right now but about 2 weeks ago when he turned his head to the right he felt a click in the right side of his neck and then started getting some headaches from that. He states is not as frequent as it was. He states overall he is doing about the same and is able to do activities around his house. Patient states he still gets back pain without leg pain. It is increased with activity. The Flexeril 20 mg at night still helps his neck pain and headaches. He states it enables him to sleep well in the night helps his back pain. He does not have to take it every night but when he does it does help. He does do a home exercise program.  He denies fever bowel bladder dysfunction. He states he was never approved for the right rotator cuff repair by  [employer] and he is basically given up on that. Physical Exam: The patient is a 78-year-old male well-developed well-nourished who is alert and oriented with a normal mood and affect. He has a full weightbearing antalgic gait. He has 5/5 strength bilateral upper and lower extremities. Negative straight leg raise. Negative Melany's. He has pain with hyperextension lumbar spine. Assessment and Plan: This is a patient who has post lumbar laminectomy syndrome lumbar and cervical spondylosis I refilled his Flexeril to 20 mg at night. We will see him back in 6-month sooner if needed. Delmus Gowers was seen today for neck pain. Diagnoses and all orders for this visit:    Postlaminectomy syndrome, not elsewhere classified  -     cyclobenzaprine (FLEXERIL) 10 MG tablet;  Take 2 tablets by mouth nightly as needed for Muscle spasms    Spondylosis without myelopathy or radiculopathy, cervical region  -     cyclobenzaprine (FLEXERIL) 10 MG tablet; Take 2 tablets by mouth nightly as needed for Muscle spasms    Other muscle spasm  -     cyclobenzaprine (FLEXERIL) 10 MG tablet; Take 2 tablets by mouth nightly as needed for Muscle spasms    Neck pain  -     cyclobenzaprine (FLEXERIL) 10 MG tablet; Take 2 tablets by mouth nightly as needed for Muscle spasms        Return in about 6 months (around 8/22/2023) for fu with NP Elisabeth.  has been . reviewed and is appropriate    Medications:  Current Outpatient Medications   Medication Sig Dispense Refill    cyclobenzaprine (FLEXERIL) 10 MG tablet Take 2 tablets by mouth nightly as needed for Muscle spasms 180 tablet 1    ascorbic acid (VITAMIN C) 1000 MG tablet Take 1,000 mg by mouth daily      aspirin-acetaminophen-caffeine (EXCEDRIN MIGRAINE) 250-250-65 MG per tablet Take 1 tablet by mouth every 8 hours as needed      Melatonin 5 MG CAPS Take 5 mg by mouth nightly as needed      pravastatin (PRAVACHOL) 40 MG tablet Take 40 mg by mouth nightly       No current facility-administered medications for this visit. Review of systems:    Past Medical History:   Diagnosis Date    Generalized osteoarthrosis, involving multiple sites     Lumbar postlaminectomy syndrome     Mixed hyperlipidemia     S/P colonoscopy 2018    follow-up 5 years    Spinal cord stimulator status     placed and removed due to little benefit     Past Surgical History:   Procedure Laterality Date    CERVICAL FUSION  2003    C4/5    COLONOSCOPY  12/2012; 1/2018    polyps, diverticula; 2nd colo neg    LUMBAR FUSION      L3-sacrum fused, reports 5 lumbar sx, Michi Wade    SHOULDER ARTHROSCOPY Right 01/2017    Dr. Ch Proper       Social History     Socioeconomic History    Marital status: Single     Spouse name: Not on file    Number of children: Not on file    Years of education: Not on file    Highest education level: Not on file   Occupational History    Not on file   Tobacco Use    Smoking status: Never    Smokeless tobacco: Former   Substance and Sexual Activity    Alcohol use: Yes     Alcohol/week: 1.0 standard drink    Drug use: Never    Sexual activity: Not on file   Other Topics Concern    Not on file   Social History Narrative    Not on file     Social Determinants of Health     Financial Resource Strain: Not on file   Food Insecurity: Not on file   Transportation Needs: Not on file   Physical Activity: Not on file   Stress: Not on file   Social Connections: Not on file   Intimate Partner Violence: Not on file   Housing Stability: Not on file     Family History   Problem Relation Age of Onset    Diabetes Neg Hx     Hypertension Neg Hx     Heart Disease Neg Hx     Stroke Neg Hx     Cancer Sister        Physical Exam:  /80 (Site: Left Upper Arm, Position: Sitting, Cuff Size: Medium Adult)   Pulse 77   Temp 98.1 °F (36.7 °C) (Temporal)   Ht 6' 1\" (1.854 m)   Wt 196 lb (88.9 kg)   SpO2 98% Comment: RA  BMI 25.86 kg/m²   Pain Scale: 2/10      We have informed Marnie Wolfe to notify us for immediate appointment if he has any worsening neurogical symptoms or if an emergency situation presents, then call 911    Please note that this dictation was completed with [employer], the computer voice recognition software. Quite often unanticipated grammatical, syntax, homophones, and other interpretive errors are inadvertently transcribed by the computer software. Please disregard these errors. Please excuse any errors that have escaped final proofreading.      LITO Castellanos - TANA

## 2023-02-22 NOTE — PROGRESS NOTES
Jonathan Vann presents today for   Chief Complaint   Patient presents with    Neck Pain       Is someone accompanying this pt? no    Is the patient using any DME equipment during OV? no    Depression Screening:  PHQ-9 Questionaire 2/22/2023 2/7/2023 2/7/2023 2/11/2022 11/16/2021 5/11/2021   Little interest or pleasure in doing things 0 0 0 0 0 0   Feeling down, depressed, or hopeless 0 0 0 0 0 0   Trouble falling or staying asleep, or sleeping too much - 0 - 0 - -   Feeling tired or having little energy - 0 - 0 - -   Poor appetite or overeating - 0 - 0 - -   Feeling bad about yourself - or that you are a failure or have let yourself or your family down - 0 - 0 - -   Trouble concentrating on things, such as reading the newspaper or watching television - 0 - 0 - -   Moving or speaking so slowly that other people could have noticed. Or the opposite - being so fidgety or restless that you have been moving around a lot more than usual - 1 - 0 - -   PHQ-9 Total Score 0 1 0 0 0 0     PHQ Scores 2/22/2023 2/7/2023 2/7/2023 2/11/2022 11/16/2021 5/11/2021   PHQ2 Score 0 0 0 0 0 0   PHQ2 Score - - - 0 0 0   PHQ9 Score 0 1 0 0 0 0   PHQ9 Score - - - 0 - -       Learning Assessment:  Who is the primary learner? Patient    What is the preferred language for health care of the primary learner? ENGLISH    How does the primary learner prefer to learn new concepts? DEMONSTRATION    Answered By patient    Relationship to Learner SELF          Coordination of Care:  1. Have you been to the ER, urgent care clinic since your last visit? Yes, pt was at the ER for rib pain on the left side. Pt also went to urgent care for the same thing Hospitalized since your last visit? no    2. Have you seen or consulted any other health care providers outside of the [address] [place] [address] since your last visit? Yes, pcp and Dr. Joanie Awan for his back  Include any pap smears or colon screening.  no

## 2023-08-07 ENCOUNTER — HOSPITAL ENCOUNTER (OUTPATIENT)
Facility: HOSPITAL | Age: 63
Discharge: HOME | End: 2023-08-10
Payer: MEDICARE

## 2023-08-07 DIAGNOSIS — M96.1 POST LAMINECTOMY SYNDROME: ICD-10-CM

## 2023-08-07 PROCEDURE — 72070 X-RAY EXAM THORAC SPINE 2VWS: CPT

## 2023-08-07 PROCEDURE — 72110 X-RAY EXAM L-2 SPINE 4/>VWS: CPT

## 2023-08-22 ENCOUNTER — OFFICE VISIT (OUTPATIENT)
Age: 63
End: 2023-08-22
Payer: COMMERCIAL

## 2023-08-22 VITALS
TEMPERATURE: 98.3 F | WEIGHT: 195 LBS | BODY MASS INDEX: 25.84 KG/M2 | OXYGEN SATURATION: 98 % | HEART RATE: 78 BPM | HEIGHT: 73 IN

## 2023-08-22 DIAGNOSIS — M47.812 SPONDYLOSIS WITHOUT MYELOPATHY OR RADICULOPATHY, CERVICAL REGION: ICD-10-CM

## 2023-08-22 DIAGNOSIS — M54.2 NECK PAIN: ICD-10-CM

## 2023-08-22 DIAGNOSIS — M96.1 POSTLAMINECTOMY SYNDROME, NOT ELSEWHERE CLASSIFIED: ICD-10-CM

## 2023-08-22 DIAGNOSIS — M62.838 OTHER MUSCLE SPASM: ICD-10-CM

## 2023-08-22 PROCEDURE — 99214 OFFICE O/P EST MOD 30 MIN: CPT | Performed by: NURSE PRACTITIONER

## 2023-08-22 RX ORDER — CYCLOBENZAPRINE HCL 10 MG
20 TABLET ORAL
Qty: 180 TABLET | Refills: 1 | Status: SHIPPED | OUTPATIENT
Start: 2023-08-22

## 2023-08-22 RX ORDER — VITAMIN E 268 MG
800 CAPSULE ORAL DAILY
COMMUNITY

## 2023-08-22 NOTE — PROGRESS NOTES
Chief complaint   Chief Complaint   Patient presents with    Neck Pain    Back Pain       History of Present Illness:  Comfort Stout is a  60 y.o.  male   who comes in today for follow-up of his chronic neck and back pain. He has had both cervical and lumbar fusion in the past.  He  saw Dr. Bala Mckenna last week for increased low back pain. He states the prior x-ray Dr. Bala Mckenna saw a fracture above his fusion but he had x-rays done last week and it shows it to be healed. States Dr. Bala Mckenna gave him another Medrol Dosepak to take. He is going to follow-up with him in a couple weeks. He states Dr. Bala Mckenna is does not feel like he needs more surgery. He does not have any leg pain. He also has neck pain but the headaches he was reporting earlier are improved with Flexeril 20 mg at night. He tries not to take it every night but it does help when he needs it. He does do exercises for flares of neck pain and that also helps. He denies fever bowel bladder dysfunction. Physical Exam: The patient is a 74-year-old male well-developed well-nourished who is alert and oriented with a normal mood and affect. He has a full weightbearing antalgic gait. He has 5 /5 strength bilateral upper and lower extremities. Negative straight leg raise. Negative Melany's. He has pain with hyperextension lumbar spine. Assessment and Plan: This is a patient who has post lumbar laminectomy syndrome and cervical postlaminectomy syndrome with spondylosis I refilled his Flexeril to 20 mg at night. We will see him back in 6-month with Dr. Dorcas Bliss for physician review. Melani Nation was seen today for neck pain and back pain. Diagnoses and all orders for this visit:    Postlaminectomy syndrome, not elsewhere classified  -     cyclobenzaprine (FLEXERIL) 10 MG tablet;  Take 2 tablets by mouth nightly as needed for Muscle spasms    Spondylosis without myelopathy or radiculopathy, cervical region  -     cyclobenzaprine (FLEXERIL) 10 MG

## 2023-08-22 NOTE — PROGRESS NOTES
Lenny Mireles presents today for   Chief Complaint   Patient presents with    Neck Pain    Back Pain       Is someone accompanying this pt? no    Is the patient using any DME equipment during OV? no    Depression Screening:  PHQ-9 Questionaire 2/22/2023 2/7/2023 2/7/2023 2/11/2022 11/16/2021 5/11/2021   Little interest or pleasure in doing things 0 0 0 0 0 0   Feeling down, depressed, or hopeless 0 0 0 0 0 0   Trouble falling or staying asleep, or sleeping too much - 0 - 0 - -   Feeling tired or having little energy - 0 - 0 - -   Poor appetite or overeating - 0 - 0 - -   Feeling bad about yourself - or that you are a failure or have let yourself or your family down - 0 - 0 - -   Trouble concentrating on things, such as reading the newspaper or watching television - 0 - 0 - -   Moving or speaking so slowly that other people could have noticed. Or the opposite - being so fidgety or restless that you have been moving around a lot more than usual - 1 - 0 - -   PHQ-9 Total Score 0 1 0 0 0 0     PHQ Scores 2/22/2023 2/7/2023 2/7/2023 2/11/2022 11/16/2021 5/11/2021   PHQ2 Score 0 0 0 0 0 0   PHQ2 Score - - - 0 0 0   PHQ9 Score 0 1 0 0 0 0   PHQ9 Score - - - 0 - -       Abuse Screening: AMB Abuse Screening 2/22/2023   Do you ever feel afraid of your partner? N   Are you in a relationship with someone who physically or mentally threatens you? N   Is it safe for you to go home? Y       Coordination of Care:  1. Have you been to the ER, urgent care clinic since your last visit? Yes, pt went to urgent care for shingles  Hospitalized since your last visit? no    2. Have you seen or consulted any other health care providers outside of the [address] [place] [address] since your last visit? Yes, Dr. Rakan Kramer any pap smears or colon screening.  no

## 2023-09-21 ENCOUNTER — TELEPHONE (OUTPATIENT)
Age: 63
End: 2023-09-21

## 2023-09-21 NOTE — TELEPHONE ENCOUNTER
Left message via voicemail for Mr. Dora Jalloh to call [facility] at [phone] option #3. Following up on stomach issues.

## 2023-09-21 NOTE — TELEPHONE ENCOUNTER
Pt was transferred over by [facility] ([facility]) complaining with middle side stomach pain on the left. States it has been about a week and no fever. Can't come today.  Please advise

## 2023-09-22 PROBLEM — S22.32XD FRACTURE OF ONE RIB, LEFT SIDE, SUBSEQUENT ENCOUNTER FOR FRACTURE WITH ROUTINE HEALING: Status: ACTIVE | Noted: 2020-04-15

## 2023-09-22 PROBLEM — B02.9 ZOSTER WITHOUT COMPLICATIONS: Status: ACTIVE | Noted: 2023-06-23

## 2023-09-22 RX ORDER — TRIAMCINOLONE ACETONIDE 1 MG/G
CREAM TOPICAL
COMMUNITY
Start: 2023-09-13

## 2023-09-22 RX ORDER — METHYLPREDNISOLONE 4 MG/1
4 TABLET ORAL DAILY
COMMUNITY
Start: 2023-09-19 | End: 2023-09-25

## 2023-09-25 ENCOUNTER — OFFICE VISIT (OUTPATIENT)
Age: 63
End: 2023-09-25
Payer: MEDICARE

## 2023-09-25 VITALS
HEART RATE: 78 BPM | RESPIRATION RATE: 16 BRPM | OXYGEN SATURATION: 99 % | SYSTOLIC BLOOD PRESSURE: 118 MMHG | TEMPERATURE: 98 F | HEIGHT: 73 IN | BODY MASS INDEX: 25.64 KG/M2 | WEIGHT: 193.5 LBS | DIASTOLIC BLOOD PRESSURE: 80 MMHG

## 2023-09-25 DIAGNOSIS — Z23 ENCOUNTER FOR IMMUNIZATION: ICD-10-CM

## 2023-09-25 DIAGNOSIS — R10.9 LEFT SIDED ABDOMINAL PAIN: Primary | ICD-10-CM

## 2023-09-25 PROCEDURE — PBSHW INFLUENZA, FLUCELVAX, (AGE 6 MO+), IM, PF, 0.5 ML: Performed by: FAMILY MEDICINE

## 2023-09-25 PROCEDURE — 1036F TOBACCO NON-USER: CPT | Performed by: FAMILY MEDICINE

## 2023-09-25 PROCEDURE — 99213 OFFICE O/P EST LOW 20 MIN: CPT | Performed by: FAMILY MEDICINE

## 2023-09-25 PROCEDURE — G8419 CALC BMI OUT NRM PARAM NOF/U: HCPCS | Performed by: FAMILY MEDICINE

## 2023-09-25 PROCEDURE — 3017F COLORECTAL CA SCREEN DOC REV: CPT | Performed by: FAMILY MEDICINE

## 2023-09-25 PROCEDURE — G0008 ADMIN INFLUENZA VIRUS VAC: HCPCS | Performed by: FAMILY MEDICINE

## 2023-09-25 PROCEDURE — 90674 CCIIV4 VAC NO PRSV 0.5 ML IM: CPT | Performed by: FAMILY MEDICINE

## 2023-09-25 PROCEDURE — G8427 DOCREV CUR MEDS BY ELIG CLIN: HCPCS | Performed by: FAMILY MEDICINE

## 2023-09-25 SDOH — ECONOMIC STABILITY: FOOD INSECURITY: WITHIN THE PAST 12 MONTHS, YOU WORRIED THAT YOUR FOOD WOULD RUN OUT BEFORE YOU GOT MONEY TO BUY MORE.: PATIENT DECLINED

## 2023-09-25 SDOH — ECONOMIC STABILITY: INCOME INSECURITY: HOW HARD IS IT FOR YOU TO PAY FOR THE VERY BASICS LIKE FOOD, HOUSING, MEDICAL CARE, AND HEATING?: PATIENT DECLINED

## 2023-09-25 SDOH — ECONOMIC STABILITY: FOOD INSECURITY: WITHIN THE PAST 12 MONTHS, THE FOOD YOU BOUGHT JUST DIDN'T LAST AND YOU DIDN'T HAVE MONEY TO GET MORE.: PATIENT DECLINED

## 2023-09-25 SDOH — ECONOMIC STABILITY: HOUSING INSECURITY
IN THE LAST 12 MONTHS, WAS THERE A TIME WHEN YOU DID NOT HAVE A STEADY PLACE TO SLEEP OR SLEPT IN A SHELTER (INCLUDING NOW)?: PATIENT REFUSED

## 2023-09-25 NOTE — PROGRESS NOTES
Chief Complaint   Patient presents with    Abdominal Pain     C/O left flank pain X 1 week (family hx of kidney stones)      1. \"Have you been to the ER, urgent care clinic since your last visit? Hospitalized since your last visit? \" No    2. \"Have you seen or consulted any other health care providers outside of the [address] [place] [address] since your last visit? \" No     3. For patients aged 43-73: Has the patient had a colonoscopy / FIT/ Cologuard? Yes - no Care Gap present due 08/01/2028      Physician order obtained. Patient completed adult immunization consent form. Allergies, contraindications and recommendations reviewed with patient. Seasonal influenza vaccine administered IM left deltoid. Patient tolerated well. Patient remained in office for 15 minutes after injection and no adverse reactions were noted.      Lot # [id number]  Exp Date: 06/30/2024 [address] [place] [address] # [id number]
Orders   1. Left sided abdominal pain  XR ABDOMEN (KUB) (SINGLE AP VIEW)    Urinalysis with Microscopic      2. Encounter for immunization  Influenza, FLUCELVAX, (age 10 mo+), IM, Preservative Free, 0.5 mL         Left sided abd pain - likely post-herpetic neuralgia. Advised on KUB and U/A if persists or intensifies. He could not provide a urine sample in the office. Reassurance for now. Flu vaccine administered. All chart history elements were reviewed by me at the time of the visit even though marked at time of note closure. Patient understands our medical plan. Patient has provided input and agrees with goals. Alternatives have been explained and offered. All questions answered. The patient is to call if condition worsens or fails to improve. Return in about 4 months (around 2/6/2024) for annual medicare wellness / routine care.

## 2023-10-16 ENCOUNTER — HOSPITAL ENCOUNTER (OUTPATIENT)
Facility: HOSPITAL | Age: 63
Discharge: HOME | End: 2023-10-19
Payer: MEDICARE

## 2023-10-16 DIAGNOSIS — R10.9 LEFT SIDED ABDOMINAL PAIN: ICD-10-CM

## 2023-10-16 LAB
APPEARANCE UR: CLEAR
BACTERIA URNS QL MICRO: NEGATIVE /HPF
BILIRUB UR QL: NEGATIVE
COLOR UR: YELLOW
EPITH CASTS URNS QL MICRO: NORMAL /LPF (ref 0–5)
GLUCOSE UR STRIP.AUTO-MCNC: NEGATIVE MG/DL
HGB UR QL STRIP: NEGATIVE
KETONES UR QL STRIP.AUTO: NEGATIVE MG/DL
LEUKOCYTE ESTERASE UR QL STRIP.AUTO: NEGATIVE
NITRITE UR QL STRIP.AUTO: NEGATIVE
PH UR STRIP: 6.5 (ref 5–8)
PROT UR STRIP-MCNC: NEGATIVE MG/DL
RBC #/AREA URNS HPF: NEGATIVE /HPF (ref 0–5)
SP GR UR REFRACTOMETRY: 1.01 (ref 1–1.03)
UROBILINOGEN UR QL STRIP.AUTO: 0.2 EU/DL (ref 0.2–1)
WBC URNS QL MICRO: NORMAL /HPF (ref 0–4)

## 2023-10-16 PROCEDURE — 74018 RADEX ABDOMEN 1 VIEW: CPT

## 2023-10-16 PROCEDURE — 81001 URINALYSIS AUTO W/SCOPE: CPT

## 2023-10-18 ENCOUNTER — OFFICE VISIT (OUTPATIENT)
Age: 63
End: 2023-10-18
Payer: COMMERCIAL

## 2023-10-18 VITALS
HEART RATE: 82 BPM | TEMPERATURE: 97.8 F | WEIGHT: 199 LBS | HEIGHT: 73 IN | BODY MASS INDEX: 26.37 KG/M2 | OXYGEN SATURATION: 97 %

## 2023-10-18 DIAGNOSIS — G89.29 CHRONIC LEFT-SIDED LOW BACK PAIN WITH RIGHT-SIDED SCIATICA: ICD-10-CM

## 2023-10-18 DIAGNOSIS — M54.41 CHRONIC LEFT-SIDED LOW BACK PAIN WITH RIGHT-SIDED SCIATICA: ICD-10-CM

## 2023-10-18 DIAGNOSIS — M96.1 POSTLAMINECTOMY SYNDROME, NOT ELSEWHERE CLASSIFIED: Primary | ICD-10-CM

## 2023-10-18 PROCEDURE — 99213 OFFICE O/P EST LOW 20 MIN: CPT | Performed by: NURSE PRACTITIONER

## 2023-10-18 NOTE — H&P (VIEW-ONLY)
as needed      Melatonin 5 MG CAPS Take 5 mg by mouth nightly as needed      pravastatin (PRAVACHOL) 40 MG tablet Take 1 tablet by mouth nightly       No current facility-administered medications for this visit. Review of systems:    Past Medical History:   Diagnosis Date    Generalized osteoarthrosis, involving multiple sites     H/O colonoscopy with polypectomy 08/01/2023    Dr. Nazanin Welch; polyp (path pending); repeat colonscopy in 5 years    Lumbar postlaminectomy syndrome     Mixed hyperlipidemia     S/P colonoscopy 2018    follow-up 5 years    Spinal cord stimulator status     placed and removed due to little benefit     Past Surgical History:   Procedure Laterality Date    CERVICAL FUSION  2003    C4/5    COLONOSCOPY  12/2012; 1/2018    polyps, diverticula; 2nd colo neg    LUMBAR FUSION      L3-sacrum fused, reports 5 lumbar sx, Michi Wells    SHOULDER ARTHROSCOPY Right 01/2017    Dr. Jayda Villanueva       Social History     Socioeconomic History    Marital status: Single     Spouse name: Not on file    Number of children: Not on file    Years of education: Not on file    Highest education level: Not on file   Occupational History    Not on file   Tobacco Use    Smoking status: Never    Smokeless tobacco: Former   Substance and Sexual Activity    Alcohol use:  Yes     Alcohol/week: 1.0 standard drink of alcohol    Drug use: Never    Sexual activity: Not on file   Other Topics Concern    Not on file   Social History Narrative    Not on file     Social Determinants of Health     Financial Resource Strain: Unknown (9/25/2023)    Overall Financial Resource Strain (CARDIA)     Difficulty of Paying Living Expenses: Patient refused   Food Insecurity: Unknown (9/25/2023)    Hunger Vital Sign     Worried About Running Out of Food in the Last Year: Patient refused     [address] in the Last Year: Patient refused   Transportation Needs: Unknown (9/25/2023)    Slim Malave

## 2023-10-24 ENCOUNTER — HOSPITAL ENCOUNTER (OUTPATIENT)
Facility: HOSPITAL | Age: 63
Discharge: HOME | End: 2023-10-27
Payer: MEDICARE

## 2023-10-24 VITALS
OXYGEN SATURATION: 97 % | RESPIRATION RATE: 16 BRPM | SYSTOLIC BLOOD PRESSURE: 139 MMHG | DIASTOLIC BLOOD PRESSURE: 89 MMHG | TEMPERATURE: 98.3 F | HEART RATE: 83 BPM

## 2023-10-24 PROCEDURE — 62323 NJX INTERLAMINAR LMBR/SAC: CPT | Performed by: PHYSICAL MEDICINE & REHABILITATION

## 2023-10-24 PROCEDURE — 2500000003 HC RX 250 WO HCPCS: Performed by: PHYSICAL MEDICINE & REHABILITATION

## 2023-10-24 PROCEDURE — 6360000002 HC RX W HCPCS: Performed by: PHYSICAL MEDICINE & REHABILITATION

## 2023-10-24 PROCEDURE — 62323 NJX INTERLAMINAR LMBR/SAC: CPT

## 2023-10-24 PROCEDURE — 6360000004 HC RX CONTRAST MEDICATION: Performed by: PHYSICAL MEDICINE & REHABILITATION

## 2023-10-24 RX ORDER — DEXAMETHASONE SODIUM PHOSPHATE 10 MG/ML
10 INJECTION, SOLUTION INTRAMUSCULAR; INTRAVENOUS ONCE
Status: COMPLETED | OUTPATIENT
Start: 2023-10-24 | End: 2023-10-24

## 2023-10-24 RX ORDER — DIAZEPAM 5 MG/1
5 TABLET ORAL ONCE
Status: DISCONTINUED | OUTPATIENT
Start: 2023-10-24 | End: 2023-10-28 | Stop reason: HOSPADM

## 2023-10-24 RX ORDER — DIAZEPAM 5 MG/1
2.5 TABLET ORAL ONCE
Status: DISCONTINUED | OUTPATIENT
Start: 2023-10-24 | End: 2023-10-28 | Stop reason: HOSPADM

## 2023-10-24 RX ORDER — ASPIRIN 81 MG/1
81 TABLET, CHEWABLE ORAL DAILY
COMMUNITY

## 2023-10-24 RX ORDER — LIDOCAINE HYDROCHLORIDE 10 MG/ML
30 INJECTION, SOLUTION EPIDURAL; INFILTRATION; INTRACAUDAL; PERINEURAL ONCE
Status: COMPLETED | OUTPATIENT
Start: 2023-10-24 | End: 2023-10-24

## 2023-10-24 RX ORDER — IOPAMIDOL 408 MG/ML
4 INJECTION, SOLUTION INTRATHECAL
Status: COMPLETED | OUTPATIENT
Start: 2023-10-24 | End: 2023-10-24

## 2023-10-24 RX ORDER — DIAZEPAM 5 MG/1
10 TABLET ORAL ONCE
Status: DISCONTINUED | OUTPATIENT
Start: 2023-10-24 | End: 2023-10-28 | Stop reason: HOSPADM

## 2023-10-24 RX ADMIN — IOPAMIDOL 1 ML: 408 INJECTION, SOLUTION INTRATHECAL at 08:38

## 2023-10-24 RX ADMIN — LIDOCAINE HYDROCHLORIDE 15 ML: 10 INJECTION, SOLUTION EPIDURAL; INFILTRATION; INTRACAUDAL; PERINEURAL at 08:34

## 2023-10-24 RX ADMIN — DEXAMETHASONE SODIUM PHOSPHATE 10 MG: 10 INJECTION, SOLUTION INTRAMUSCULAR; INTRAVENOUS at 08:39

## 2023-10-24 ASSESSMENT — PAIN SCALES - GENERAL: PAINLEVEL_OUTOF10: 3

## 2023-10-24 NOTE — INTERVAL H&P NOTE
Update History & Physical    The patient's History and Physical of October 18, 2023 was reviewed. There was no change. The surgical site was confirmed by the patient and me. Plan: The risks, benefits, expected outcome, and alternative to the recommended procedure have been discussed with the patient. Patient understands and wants to proceed with the procedure.      Electronically signed by Jorge Luis Brantley MD on 10/24/2023 at 8:27 AM

## 2023-12-01 ENCOUNTER — OFFICE VISIT (OUTPATIENT)
Age: 63
End: 2023-12-01
Payer: COMMERCIAL

## 2023-12-01 VITALS
TEMPERATURE: 98.1 F | HEIGHT: 73 IN | HEART RATE: 82 BPM | WEIGHT: 199 LBS | BODY MASS INDEX: 26.37 KG/M2 | OXYGEN SATURATION: 96 %

## 2023-12-01 DIAGNOSIS — M47.812 SPONDYLOSIS WITHOUT MYELOPATHY OR RADICULOPATHY, CERVICAL REGION: ICD-10-CM

## 2023-12-01 DIAGNOSIS — M96.1 POSTLAMINECTOMY SYNDROME, NOT ELSEWHERE CLASSIFIED: Primary | ICD-10-CM

## 2023-12-01 PROCEDURE — 99213 OFFICE O/P EST LOW 20 MIN: CPT | Performed by: NURSE PRACTITIONER

## 2023-12-01 ASSESSMENT — PATIENT HEALTH QUESTIONNAIRE - PHQ9
SUM OF ALL RESPONSES TO PHQ9 QUESTIONS 1 & 2: 0
1. LITTLE INTEREST OR PLEASURE IN DOING THINGS: 0
SUM OF ALL RESPONSES TO PHQ QUESTIONS 1-9: 0
2. FEELING DOWN, DEPRESSED OR HOPELESS: 0

## 2023-12-01 NOTE — PROGRESS NOTES
Chief complaint   Chief Complaint   Patient presents with    Back Pain       History of Present Illness:  Gini Pena is a  58 y.o.  male who comes in today for follow-up of his L2-3 epidural steroid injection done October 24, 2023. He states it did help his back pain about 60% and it is still helping. He is able to walk and do activities without as much pain. Today he only rates his pain as a 1 out of 10 and its intermittent. He does have neck pain and has had a prior ACDF at C4-5. He also has known osteophytes at C6-7. He does not have any radicular pain into his arms or his legs but he states his neck feels like it gets stuck in positions at time. He does have a follow-up with Dr. Mary Kerr in February for his neck and a follow-up with Dr. Nahed Singh for his back in January. He denies fever bowel bladder dysfunction he is retired. Physical Exam: The patient is a 65-year-old male well-developed well-nourished who is alert and oriented with a normal mood and affect. He has a full weightbearing nonantalgic gait. Did not use any assist device. He has 4 out of 5 strength bilateral upper lower extremities. Negative straight leg raise. Negative Melany's. He does have some pain with hyperextension lumbar spine. Assessment and Plan: This is a patient who has post lumbar and post cervical laminectomy syndrome. The L2-3 epidural steroid injection has been helpful for him. He does take Flexeril as needed. He does not need a refill of it at this time. We will see him back at his scheduled follow-up appointment. Whit Gonzalez was seen today for back pain. Diagnoses and all orders for this visit:    Postlaminectomy syndrome, not elsewhere classified    Spondylosis without myelopathy or radiculopathy, cervical region        No follow-ups on file.  has been . reviewed and is appropriate    Medications:  Current Outpatient Medications   Medication Sig Dispense Refill    aspirin 81 MG chewable tablet

## 2023-12-01 NOTE — PROGRESS NOTES
Keven Tinajero presents today for   Chief Complaint   Patient presents with    Back Pain       Is someone accompanying this pt? no    Is the patient using any DME equipment during OV? no      Coordination of Care:  1. Have you been to the ER, urgent care clinic since your last visit? no  Hospitalized since your last visit? no    2. Have you seen or consulted any other health care providers outside of the [address] [place] [address] since your last visit? no Include any pap smears or colon screening.  no

## 2024-02-01 ENCOUNTER — TELEPHONE (OUTPATIENT)
Age: 64
End: 2024-02-01

## 2024-02-01 DIAGNOSIS — R73.01 IMPAIRED FASTING GLUCOSE: ICD-10-CM

## 2024-02-01 DIAGNOSIS — Z00.00 ENCOUNTER FOR GENERAL ADULT MEDICAL EXAMINATION WITHOUT ABNORMAL FINDINGS: Primary | ICD-10-CM

## 2024-02-01 DIAGNOSIS — Z12.5 ENCOUNTER FOR SCREENING FOR MALIGNANT NEOPLASM OF PROSTATE: ICD-10-CM

## 2024-02-01 DIAGNOSIS — E78.2 MIXED HYPERLIPIDEMIA: ICD-10-CM

## 2024-02-02 NOTE — TELEPHONE ENCOUNTER
Office to notify patient labs are ordered. Please have done fasting. He can do them prior to his appointment if possible.  Weekend would be great if they are open.  Or Monday.

## 2024-02-03 ENCOUNTER — HOSPITAL ENCOUNTER (OUTPATIENT)
Facility: HOSPITAL | Age: 64
Discharge: HOME | End: 2024-02-06
Payer: MEDICARE

## 2024-02-03 DIAGNOSIS — Z12.5 ENCOUNTER FOR SCREENING FOR MALIGNANT NEOPLASM OF PROSTATE: ICD-10-CM

## 2024-02-03 DIAGNOSIS — R73.01 IMPAIRED FASTING GLUCOSE: ICD-10-CM

## 2024-02-03 DIAGNOSIS — Z00.00 ENCOUNTER FOR GENERAL ADULT MEDICAL EXAMINATION WITHOUT ABNORMAL FINDINGS: ICD-10-CM

## 2024-02-03 DIAGNOSIS — E78.2 MIXED HYPERLIPIDEMIA: ICD-10-CM

## 2024-02-03 LAB
ALBUMIN SERPL-MCNC: 3.8 G/DL (ref 3.4–5)
ALBUMIN/GLOB SERPL: 1.2 (ref 0.8–1.7)
ALP SERPL-CCNC: 80 U/L (ref 45–117)
ALT SERPL-CCNC: 26 U/L (ref 16–61)
ANION GAP SERPL CALC-SCNC: 3 MMOL/L (ref 3–18)
AST SERPL-CCNC: 13 U/L (ref 10–38)
BASOPHILS # BLD: 0.1 K/UL (ref 0–0.1)
BASOPHILS NFR BLD: 0 % (ref 0–2)
BILIRUB SERPL-MCNC: 0.7 MG/DL (ref 0.2–1)
BUN SERPL-MCNC: 9 MG/DL (ref 7–18)
BUN/CREAT SERPL: 8 (ref 12–20)
CALCIUM SERPL-MCNC: 9.5 MG/DL (ref 8.5–10.1)
CHLORIDE SERPL-SCNC: 106 MMOL/L (ref 100–111)
CHOLEST SERPL-MCNC: 171 MG/DL
CO2 SERPL-SCNC: 28 MMOL/L (ref 21–32)
CREAT SERPL-MCNC: 1.19 MG/DL (ref 0.6–1.3)
DIFFERENTIAL METHOD BLD: ABNORMAL
EOSINOPHIL # BLD: 0.1 K/UL (ref 0–0.4)
EOSINOPHIL NFR BLD: 1 % (ref 0–5)
ERYTHROCYTE [DISTWIDTH] IN BLOOD BY AUTOMATED COUNT: 12.7 % (ref 11.6–14.5)
EST. AVERAGE GLUCOSE BLD GHB EST-MCNC: 114 MG/DL
GLOBULIN SER CALC-MCNC: 3.1 G/DL (ref 2–4)
GLUCOSE SERPL-MCNC: 95 MG/DL (ref 74–99)
HBA1C MFR BLD: 5.6 % (ref 4.2–5.6)
HCT VFR BLD AUTO: 48.7 % (ref 36–48)
HDLC SERPL-MCNC: 49 MG/DL (ref 40–60)
HDLC SERPL: 3.5 (ref 0–5)
HGB BLD-MCNC: 16.1 G/DL (ref 13–16)
IMM GRANULOCYTES # BLD AUTO: 0 K/UL (ref 0–0.04)
IMM GRANULOCYTES NFR BLD AUTO: 0 % (ref 0–0.5)
LDLC SERPL CALC-MCNC: 100.4 MG/DL (ref 0–100)
LIPID PANEL: ABNORMAL
LYMPHOCYTES # BLD: 3.3 K/UL (ref 0.9–3.6)
LYMPHOCYTES NFR BLD: 25 % (ref 21–52)
MCH RBC QN AUTO: 31 PG (ref 24–34)
MCHC RBC AUTO-ENTMCNC: 33.1 G/DL (ref 31–37)
MCV RBC AUTO: 93.7 FL (ref 78–100)
MONOCYTES # BLD: 1.3 K/UL (ref 0.05–1.2)
MONOCYTES NFR BLD: 10 % (ref 3–10)
NEUTS SEG # BLD: 8.3 K/UL (ref 1.8–8)
NEUTS SEG NFR BLD: 64 % (ref 40–73)
NRBC # BLD: 0 K/UL (ref 0–0.01)
NRBC BLD-RTO: 0 PER 100 WBC
PLATELET # BLD AUTO: 359 K/UL (ref 135–420)
PMV BLD AUTO: 9.6 FL (ref 9.2–11.8)
POTASSIUM SERPL-SCNC: 4 MMOL/L (ref 3.5–5.5)
PROT SERPL-MCNC: 6.9 G/DL (ref 6.4–8.2)
PSA SERPL-MCNC: 0.7 NG/ML (ref 0–4)
RBC # BLD AUTO: 5.2 M/UL (ref 4.35–5.65)
SODIUM SERPL-SCNC: 137 MMOL/L (ref 136–145)
TRIGL SERPL-MCNC: 108 MG/DL
VLDLC SERPL CALC-MCNC: 21.6 MG/DL
WBC # BLD AUTO: 13.1 K/UL (ref 4.6–13.2)

## 2024-02-03 PROCEDURE — 80061 LIPID PANEL: CPT

## 2024-02-03 PROCEDURE — 85025 COMPLETE CBC W/AUTO DIFF WBC: CPT

## 2024-02-03 PROCEDURE — G0103 PSA SCREENING: HCPCS

## 2024-02-03 PROCEDURE — 83036 HEMOGLOBIN GLYCOSYLATED A1C: CPT

## 2024-02-03 PROCEDURE — 80053 COMPREHEN METABOLIC PANEL: CPT

## 2024-02-03 PROCEDURE — 36415 COLL VENOUS BLD VENIPUNCTURE: CPT

## 2024-02-06 ENCOUNTER — OFFICE VISIT (OUTPATIENT)
Age: 64
End: 2024-02-06

## 2024-02-06 ENCOUNTER — OFFICE VISIT (OUTPATIENT)
Age: 64
End: 2024-02-06
Payer: MEDICARE

## 2024-02-06 VITALS
BODY MASS INDEX: 25.71 KG/M2 | RESPIRATION RATE: 16 BRPM | HEART RATE: 76 BPM | OXYGEN SATURATION: 98 % | TEMPERATURE: 98.2 F | SYSTOLIC BLOOD PRESSURE: 120 MMHG | HEIGHT: 73 IN | WEIGHT: 194 LBS | DIASTOLIC BLOOD PRESSURE: 80 MMHG

## 2024-02-06 VITALS
OXYGEN SATURATION: 98 % | WEIGHT: 194 LBS | HEART RATE: 76 BPM | RESPIRATION RATE: 16 BRPM | BODY MASS INDEX: 25.71 KG/M2 | TEMPERATURE: 98.2 F | DIASTOLIC BLOOD PRESSURE: 80 MMHG | HEIGHT: 73 IN | SYSTOLIC BLOOD PRESSURE: 120 MMHG

## 2024-02-06 DIAGNOSIS — R73.01 IMPAIRED FASTING GLUCOSE: ICD-10-CM

## 2024-02-06 DIAGNOSIS — E78.2 MIXED HYPERLIPIDEMIA: Primary | ICD-10-CM

## 2024-02-06 DIAGNOSIS — Z00.00 MEDICARE ANNUAL WELLNESS VISIT, SUBSEQUENT: Primary | ICD-10-CM

## 2024-02-06 DIAGNOSIS — M54.9 CHRONIC BACK PAIN, UNSPECIFIED BACK LOCATION, UNSPECIFIED BACK PAIN LATERALITY: ICD-10-CM

## 2024-02-06 DIAGNOSIS — Z71.89 ACP (ADVANCE CARE PLANNING): ICD-10-CM

## 2024-02-06 DIAGNOSIS — G89.29 CHRONIC BACK PAIN, UNSPECIFIED BACK LOCATION, UNSPECIFIED BACK PAIN LATERALITY: ICD-10-CM

## 2024-02-06 DIAGNOSIS — R73.03 PREDIABETES: ICD-10-CM

## 2024-02-06 PROCEDURE — G8427 DOCREV CUR MEDS BY ELIG CLIN: HCPCS | Performed by: FAMILY MEDICINE

## 2024-02-06 PROCEDURE — 99214 OFFICE O/P EST MOD 30 MIN: CPT | Performed by: FAMILY MEDICINE

## 2024-02-06 PROCEDURE — 99497 ADVNCD CARE PLAN 30 MIN: CPT | Performed by: FAMILY MEDICINE

## 2024-02-06 PROCEDURE — 3017F COLORECTAL CA SCREEN DOC REV: CPT | Performed by: FAMILY MEDICINE

## 2024-02-06 PROCEDURE — G8419 CALC BMI OUT NRM PARAM NOF/U: HCPCS | Performed by: FAMILY MEDICINE

## 2024-02-06 PROCEDURE — G0439 PPPS, SUBSEQ VISIT: HCPCS | Performed by: FAMILY MEDICINE

## 2024-02-06 PROCEDURE — 1036F TOBACCO NON-USER: CPT | Performed by: FAMILY MEDICINE

## 2024-02-06 PROCEDURE — G8482 FLU IMMUNIZE ORDER/ADMIN: HCPCS | Performed by: FAMILY MEDICINE

## 2024-02-06 RX ORDER — PRAVASTATIN SODIUM 40 MG
40 TABLET ORAL NIGHTLY
Qty: 90 TABLET | Refills: 3 | Status: SHIPPED | OUTPATIENT
Start: 2024-02-06

## 2024-02-06 ASSESSMENT — PATIENT HEALTH QUESTIONNAIRE - PHQ9
SUM OF ALL RESPONSES TO PHQ9 QUESTIONS 1 & 2: 0
2. FEELING DOWN, DEPRESSED OR HOPELESS: 0
1. LITTLE INTEREST OR PLEASURE IN DOING THINGS: 0
SUM OF ALL RESPONSES TO PHQ QUESTIONS 1-9: 0

## 2024-02-06 ASSESSMENT — LIFESTYLE VARIABLES
HOW OFTEN DO YOU HAVE A DRINK CONTAINING ALCOHOL: NEVER
HOW MANY STANDARD DRINKS CONTAINING ALCOHOL DO YOU HAVE ON A TYPICAL DAY: PATIENT DOES NOT DRINK

## 2024-02-06 NOTE — PROGRESS NOTES
Chief Complaint   Patient presents with    Medicare AWV      1. \"Have you been to the ER, urgent care clinic since your last visit?  Hospitalized since your last visit?\" No    2. \"Have you seen or consulted any other health care providers outside of the [facility] since your last visit?\" No     3. For patients aged 45-75: Has the patient had a colonoscopy / FIT/ Cologuard? Yes - no Care Gap present due 08/01/2028        
Reconciliation, Ar       CareTeam (Including outside providers/suppliers regularly involved in providing care):   Patient Care Team:  Osiel Lester MD as PCP - General  Osiel Lester MD as PCP - Empaneled Provider     Reviewed and updated this visit:  Tobacco  Allergies  Med Hx  Surg Hx  Soc Hx  Fam Hx

## 2024-02-06 NOTE — PROGRESS NOTES
Chief Complaint   Patient presents with    Results     Review of results     Cholesterol Problem    Chronic Back Pain     IFG-PRE-DM      1. \"Have you been to the ER, urgent care clinic since your last visit?  Hospitalized since your last visit?\" No    2. \"Have you seen or consulted any other health care providers outside of the [facility] since your last visit?\" No     3. For patients aged 45-75: Has the patient had a colonoscopy / FIT/ Cologuard? Yes - no Care Gap present due 08/01/2028

## 2024-02-06 NOTE — PATIENT INSTRUCTIONS
professional. [employer] disclaims any warranty or liability for your use of this information.       Patient Education        Prediabetes: Care Instructions  Overview     Prediabetes is a warning sign that you're at risk for getting type 2 diabetes. It means that your blood sugar is higher than it should be. But it's not high enough to be diabetes.  The food you eat naturally turns into sugar. Your body uses the sugar for energy. Normally, an organ called the pancreas makes insulin. And insulin allows the sugar in your blood to get into your body's cells. But sometimes the body can't use insulin the right way. So the sugar stays in your blood instead. This is called insulin resistance. The buildup of sugar in your blood means you have prediabetes.  The good news is that you may be able to prevent or delay diabetes. Making small lifestyle changes, like getting active and changing your eating habits, may help you get your blood sugar back to normal. You can work with your doctor to make a treatment plan.  Follow-up care is a key part of your treatment and safety. Be sure to make and go to all appointments, and call your doctor if you are having problems. It's also a good idea to know your test results and keep a list of the medicines you take.  How can you care for yourself at home?  Watch your weight. A healthy weight helps your body use insulin properly.  Limit the amount of calories, sweets, and unhealthy fat you eat. Ask your doctor if you should see a dietitian. A registered dietitian can help you create meal plans that fit your lifestyle.  Get at least 30 minutes of exercise on most days of the week. Exercise helps control your blood sugar. It also helps you maintain a healthy weight. Walking is a good choice. You also may want to do other activities, such as running, swimming, cycling, or playing tennis or team sports.  Do not smoke. Smoking can make prediabetes worse. If you need help quitting,

## 2024-02-06 NOTE — ACP (ADVANCE CARE PLANNING)
Advanced Care Planning: Discussed the patient’s choices for care and treatment in case of a health event that adversely affects decision-making abilities. Also discussed the patient’s long-term treatment options. Reviewed with the patient the appropriate state-specific advance directive documents. Reviewed the process of designating a competent adult as an Agent (or -in-fact) that could take make health care decisions for the patient if incompetent. Patient was asked to complete the declaration forms, if they have not already, either acknowledge the forms by a public notary or an eligible witness and provide a signed copy to the practice office.  Time spent (minutes): 16    Declines ACP referral.  Has 2 children.  Not .      Primary Decision Maker: cuca bennett - Child - [phone]    Primary Decision Maker: flaco stallworth - Child - [phone]

## 2024-02-06 NOTE — PROGRESS NOTES
SUBJECTIVE  Chief Complaint   Patient presents with    Results     Review of results     Cholesterol Problem    Chronic Back Pain     IFG-PRE-DM     Here for routine follow-up.    Left sided abd / rib pain has resolved.     He is under the care of the spine center and takes flexeril as needed.    Taking cholesterol med without side effects.     ROS - neg other than above.    OBJECTIVE    Blood pressure 120/80, pulse 76, temperature 98.2 °F (36.8 °C), temperature source Temporal, resp. rate 16, height 1.854 m (6' 1\"), weight 88 kg (194 lb), SpO2 98 %.    General:  Alert, cooperative, well appearing, in no apparent distress.  HEENT:  no oral mucosal lesions.  Neck: no masses.   CV:  The heart sounds are regular in rate and rhythm.  There is a normal S1 and S2.  There or no murmurs.    Lungs:  Inspiratory and expiratory efforts are full and unlabored.  Lung sounds are clear and equal to auscultation throughout all lung fields without wheezing, rales, or rhonchi.  Abd: benign  Ext: no swelling.   Psych: normal affect.  Mood good.  Oriented x 3.  Judgement and insight intact.      Latest Reference Range & Units 02/03/24 08:24   Sodium 136 - 145 mmol/L 137   Potassium 3.5 - 5.5 mmol/L 4.0   Chloride 100 - 111 mmol/L 106   CO2 21 - 32 mmol/L 28   BUN,BUNPL 7.0 - 18 MG/DL 9   Creatinine 0.6 - 1.3 MG/DL 1.19   Bun/Cre Ratio 12 - 20   8 (L)   Anion Gap 3.0 - 18 mmol/L 3   Est, Glom Filt Rate >60 ml/min/1.73m2 >60   Glucose, Random 74 - 99 mg/dL 95   CALCIUM, SERUM, 387733 8.5 - 10.1 MG/DL 9.5   ALBUMIN/GLOBULIN RATIO 0.8 - 1.7   1.2   Total Protein 6.4 - 8.2 g/dL 6.9   LIPID PANEL -       Chol/HDL Ratio 0 - 5.0   3.5   Cholesterol, Total <200 MG/   HDL Cholesterol 40 - 60 MG/DL 49   LDL Calculated 0 - 100 MG/.4 (H)   Triglycerides <150 MG/   VLDL Cholesterol Calculated MG/DL 21.6   Albumin 3.4 - 5.0 g/dL 3.8   Globulin 2.0 - 4.0 g/dL 3.1   Alk Phos 45 - 117 U/L 80   ALT 16 - 61 U/L 26   AST 10 - 38 U/L 13

## 2024-02-22 ENCOUNTER — OFFICE VISIT (OUTPATIENT)
Age: 64
End: 2024-02-22

## 2024-02-22 VITALS
DIASTOLIC BLOOD PRESSURE: 83 MMHG | OXYGEN SATURATION: 95 % | SYSTOLIC BLOOD PRESSURE: 124 MMHG | HEART RATE: 89 BPM | BODY MASS INDEX: 26.24 KG/M2 | TEMPERATURE: 97.7 F | HEIGHT: 73 IN | WEIGHT: 198 LBS

## 2024-02-22 DIAGNOSIS — Z98.1 HX OF FUSION OF CERVICAL SPINE: ICD-10-CM

## 2024-02-22 DIAGNOSIS — M48.061 LUMBAR SPINAL STENOSIS DUE TO ADJACENT SEGMENT DISEASE AFTER FUSION PROCEDURE: ICD-10-CM

## 2024-02-22 DIAGNOSIS — M96.1 LUMBAR POST-LAMINECTOMY SYNDROME: ICD-10-CM

## 2024-02-22 DIAGNOSIS — M51.36 LUMBAR SPINAL STENOSIS DUE TO ADJACENT SEGMENT DISEASE AFTER FUSION PROCEDURE: ICD-10-CM

## 2024-02-22 DIAGNOSIS — M50.30 DDD (DEGENERATIVE DISC DISEASE), CERVICAL: Primary | ICD-10-CM

## 2024-02-22 RX ORDER — METAXALONE 800 MG/1
800 TABLET ORAL
Qty: 90 TABLET | Refills: 1 | Status: SHIPPED | OUTPATIENT
Start: 2024-02-22 | End: 2024-08-20

## 2024-02-22 ASSESSMENT — PATIENT HEALTH QUESTIONNAIRE - PHQ9
SUM OF ALL RESPONSES TO PHQ9 QUESTIONS 1 & 2: 0
SUM OF ALL RESPONSES TO PHQ QUESTIONS 1-9: 0
SUM OF ALL RESPONSES TO PHQ QUESTIONS 1-9: 0
2. FEELING DOWN, DEPRESSED OR HOPELESS: 0
SUM OF ALL RESPONSES TO PHQ QUESTIONS 1-9: 0
SUM OF ALL RESPONSES TO PHQ QUESTIONS 1-9: 0
1. LITTLE INTEREST OR PLEASURE IN DOING THINGS: 0

## 2024-02-22 NOTE — PROGRESS NOTES
Loco Parikh presents today for   Chief Complaint   Patient presents with    Neck Pain       Is someone accompanying this pt? no    Is the patient using any DME equipment during OV? no    Coordination of Care:  1. Have you been to the ER, urgent care clinic since your last visit? no  Hospitalized since your last visit? no    2. Have you seen or consulted any other health care providers outside of the [facility] since your last visit? PCP Include any pap smears or colon screening. no

## 2024-02-22 NOTE — PROGRESS NOTES
[facility]    [address] [place] [address], [address]  [place] [address]  Phone: [phone]  Fax: [phone]        Loco Parikh  : 1960  PCP: Osiel Lester MD    PROGRESS NOTE      ASSESSMENT AND PLAN    Loco was seen today for neck pain.    Diagnoses and all orders for this visit:    DDD (degenerative disc disease), cervical  -     AMB POC XRAY, SPINE, CERVICAL; 2 OR 3  -     metaxalone (SKELAXIN) 800 MG tablet; Take 1 tablet by mouth nightly as needed for Pain  -     diclofenac sodium (VOLTAREN) 1 % GEL; Apply 2 g topically 4 times daily Prn pain    Lumbar spinal stenosis due to adjacent segment disease after fusion procedure    Lumbar post-laminectomy syndrome    Hx of fusion of cervical spine        Loco Parikh is a 63 y.o. male with junctional lumbar stenosis.  Still having benefit from PRISCILLA performed about 4 months ago.  Poor sleep due to pain.  Flexeril 20 mg now ineffective  Discontinue Flexeril.  Trial of Skelaxin.  Previously failed baclofen and Zanaflex..     Follow-up and Dispositions    Return in about 6 months (around 2024) for med adjustment/re-eval, w/TANA Barber.           HISTORY OF PRESENT ILLNESS    Loco Parikh is a 63 y.o. male presents for follow up of back pain. Patient was last seen in office by TANA Barber (2023) following his L2-L3 PRISCILLA. Patient reported 60% relief. He denied any side effects.    Patient presents with constant neck pain and grinding that bothers him most at night. He complains of headaches from radiating pain starting in his neck and endorses sleep disturbances due to his neck pain.    Patient explains that he has intermittent pain in his low back, but has sustained improved >50% since his PRISCILLA in 10/2023. He denies numbness or tingling in his legs.    Patient complains of vertigo a couples months ago that has since subsided. He denies weakness in his hands that causes him to drop objects.     Patient admits to

## 2024-03-20 ENCOUNTER — TELEPHONE (OUTPATIENT)
Age: 64
End: 2024-03-20

## 2024-03-20 DIAGNOSIS — M62.838 OTHER MUSCLE SPASM: ICD-10-CM

## 2024-03-20 DIAGNOSIS — M96.1 POSTLAMINECTOMY SYNDROME, NOT ELSEWHERE CLASSIFIED: ICD-10-CM

## 2024-03-20 DIAGNOSIS — M54.2 NECK PAIN: ICD-10-CM

## 2024-03-20 DIAGNOSIS — M47.812 SPONDYLOSIS WITHOUT MYELOPATHY OR RADICULOPATHY, CERVICAL REGION: ICD-10-CM

## 2024-03-21 RX ORDER — CYCLOBENZAPRINE HCL 10 MG
20 TABLET ORAL
Qty: 180 TABLET | Refills: 0 | Status: SHIPPED | OUTPATIENT
Start: 2024-03-21

## 2024-03-21 NOTE — TELEPHONE ENCOUNTER
I called and spoke to the pt. The pt was identified using 2 pt identifiers. He was asked if he has been taking the skelaxin. The pt states that he did and it is not working like the flexeril did. He states that Dr. Farris told him to try the skelaxin. If this did not work/help, he could go back on the flexeril. I let the pt know I would route the message back to the provider for review. He verbalized understanding and has no questions or concerns at this time.

## 2024-08-22 ENCOUNTER — OFFICE VISIT (OUTPATIENT)
Age: 64
End: 2024-08-22
Payer: MEDICARE

## 2024-08-22 VITALS
HEIGHT: 73 IN | OXYGEN SATURATION: 98 % | TEMPERATURE: 97.3 F | BODY MASS INDEX: 25.84 KG/M2 | WEIGHT: 195 LBS | HEART RATE: 89 BPM

## 2024-08-22 DIAGNOSIS — M62.838 OTHER MUSCLE SPASM: ICD-10-CM

## 2024-08-22 DIAGNOSIS — M50.30 DDD (DEGENERATIVE DISC DISEASE), CERVICAL: ICD-10-CM

## 2024-08-22 DIAGNOSIS — M96.1 POSTLAMINECTOMY SYNDROME, NOT ELSEWHERE CLASSIFIED: ICD-10-CM

## 2024-08-22 DIAGNOSIS — M47.812 SPONDYLOSIS WITHOUT MYELOPATHY OR RADICULOPATHY, CERVICAL REGION: ICD-10-CM

## 2024-08-22 DIAGNOSIS — M54.2 NECK PAIN: ICD-10-CM

## 2024-08-22 PROCEDURE — 99214 OFFICE O/P EST MOD 30 MIN: CPT | Performed by: NURSE PRACTITIONER

## 2024-08-22 PROCEDURE — G8419 CALC BMI OUT NRM PARAM NOF/U: HCPCS | Performed by: NURSE PRACTITIONER

## 2024-08-22 PROCEDURE — 1036F TOBACCO NON-USER: CPT | Performed by: NURSE PRACTITIONER

## 2024-08-22 PROCEDURE — G8427 DOCREV CUR MEDS BY ELIG CLIN: HCPCS | Performed by: NURSE PRACTITIONER

## 2024-08-22 PROCEDURE — 3017F COLORECTAL CA SCREEN DOC REV: CPT | Performed by: NURSE PRACTITIONER

## 2024-08-22 RX ORDER — CYCLOBENZAPRINE HCL 10 MG
20 TABLET ORAL
Qty: 180 TABLET | Refills: 0 | Status: SHIPPED | OUTPATIENT
Start: 2024-08-22

## 2024-08-22 NOTE — PROGRESS NOTES
Chief complaint   Chief Complaint   Patient presents with    Medication Refill       History of Present Illness:  Loco Parikh is a  63 y.o.  male who comes in today for follow-up of his chronic back pain due to post lumbar laminectomy syndrome.  He is fused from L3-S1.  He has also had a ACDF at C4-[facility].  He states he does get neck pain that radiates to his right arm and sometimes gets headaches.  Flexeril 20 mg at bedtime does help relax him enough to relieve that pain and also help him sleep through the night.  He has back pain and has seen Dr. Villatoro since we last saw him who did not recommend any further surgery and has referred him to pain management.  He had a L2-3 epidural steroid injection in October 2023 which gave him about 60% relief and has lasted until now.  He would like to have that repeated.  He denies fever bowel bladder dysfunction.  He is retired on Worker[employer] Compensation.  He is a non-smoker.      Physical Exam: The patient is a 63-year-old male well-developed well-nourished who is alert and oriented with a normal mood and affect.  He has a full weightbearing on touch gait.  Not use any assist device.  He has 4 out of 5 strength bilateral upper and lower extremities.  Negative straight leg raise.  Negative Melany's.  He has pain with hyperextension lumbar spine.      Assessment and Plan: This is a patient with post lumbar laminectomy syndrome and spondylosis of his neck and back.  I will set him up for repeat L2-3 epidural steroid injections as they are very effective for him.  I have refilled his Flexeril.  I will give him Voltaren gel to use on his neck and back.  This was given to him at the last visit but he never did get it so I have reordered that for him.    Loco was seen today for medication refill.    Diagnoses and all orders for this visit:    Postlaminectomy syndrome, not elsewhere classified  -     cyclobenzaprine (FLEXERIL) 10 MG tablet; Take 2 tablets by mouth nightly as needed

## 2024-09-23 ENCOUNTER — CLINICAL DOCUMENTATION (OUTPATIENT)
Age: 64
End: 2024-09-23

## 2024-09-24 ENCOUNTER — HOSPITAL ENCOUNTER (OUTPATIENT)
Facility: HOSPITAL | Age: 64
Discharge: HOME | End: 2024-09-27
Payer: COMMERCIAL

## 2024-09-24 ENCOUNTER — HOSPITAL ENCOUNTER (OUTPATIENT)
Facility: HOSPITAL | Age: 64
Discharge: HOME | End: 2024-09-27

## 2024-09-24 VITALS
HEART RATE: 82 BPM | TEMPERATURE: 97.6 F | OXYGEN SATURATION: 97 % | SYSTOLIC BLOOD PRESSURE: 137 MMHG | RESPIRATION RATE: 16 BRPM | DIASTOLIC BLOOD PRESSURE: 88 MMHG

## 2024-09-24 PROCEDURE — 62323 NJX INTERLAMINAR LMBR/SAC: CPT

## 2024-09-24 PROCEDURE — 2500000003 HC RX 250 WO HCPCS: Performed by: PHYSICAL MEDICINE & REHABILITATION

## 2024-09-24 PROCEDURE — 6370000000 HC RX 637 (ALT 250 FOR IP): Performed by: PHYSICAL MEDICINE & REHABILITATION

## 2024-09-24 PROCEDURE — 6360000004 HC RX CONTRAST MEDICATION: Performed by: PHYSICAL MEDICINE & REHABILITATION

## 2024-09-24 PROCEDURE — 6360000002 HC RX W HCPCS: Performed by: PHYSICAL MEDICINE & REHABILITATION

## 2024-09-24 PROCEDURE — 62323 NJX INTERLAMINAR LMBR/SAC: CPT | Performed by: PHYSICAL MEDICINE & REHABILITATION

## 2024-09-24 RX ORDER — DIAZEPAM 5 MG
10 TABLET ORAL ONCE
Status: COMPLETED | OUTPATIENT
Start: 2024-09-24 | End: 2024-09-24

## 2024-09-24 RX ORDER — THIAMINE MONONITRATE (VIT B1) 100 MG
100 TABLET ORAL DAILY
COMMUNITY

## 2024-09-24 RX ORDER — DIAZEPAM 5 MG
2.5 TABLET ORAL ONCE
Status: COMPLETED | OUTPATIENT
Start: 2024-09-24 | End: 2024-09-24

## 2024-09-24 RX ORDER — IOPAMIDOL 408 MG/ML
4 INJECTION, SOLUTION INTRATHECAL
Status: COMPLETED | OUTPATIENT
Start: 2024-09-24 | End: 2024-09-24

## 2024-09-24 RX ORDER — DIAZEPAM 5 MG
5 TABLET ORAL ONCE
Status: COMPLETED | OUTPATIENT
Start: 2024-09-24 | End: 2024-09-24

## 2024-09-24 RX ORDER — DEXAMETHASONE SODIUM PHOSPHATE 10 MG/ML
10 INJECTION, SOLUTION INTRAMUSCULAR; INTRAVENOUS ONCE
Status: COMPLETED | OUTPATIENT
Start: 2024-09-24 | End: 2024-09-24

## 2024-09-24 RX ORDER — LIDOCAINE HYDROCHLORIDE 10 MG/ML
30 INJECTION, SOLUTION EPIDURAL; INFILTRATION; INTRACAUDAL; PERINEURAL ONCE
Status: COMPLETED | OUTPATIENT
Start: 2024-09-24 | End: 2024-09-24

## 2024-09-24 RX ADMIN — IOPAMIDOL 2 ML: 408 INJECTION, SOLUTION INTRATHECAL at 08:34

## 2024-09-24 RX ADMIN — LIDOCAINE HYDROCHLORIDE 15 ML: 10 INJECTION, SOLUTION EPIDURAL; INFILTRATION; INTRACAUDAL; PERINEURAL at 08:29

## 2024-09-24 RX ADMIN — DIAZEPAM 5 MG: 5 TABLET ORAL at 08:00

## 2024-09-24 RX ADMIN — DEXAMETHASONE SODIUM PHOSPHATE 10 MG: 10 INJECTION, SOLUTION INTRAMUSCULAR; INTRAVENOUS at 08:34

## 2024-09-24 ASSESSMENT — PAIN - FUNCTIONAL ASSESSMENT: PAIN_FUNCTIONAL_ASSESSMENT: 0-10

## 2024-09-24 ASSESSMENT — PAIN DESCRIPTION - DESCRIPTORS: DESCRIPTORS: SHARP;STABBING

## 2024-09-24 ASSESSMENT — PAIN SCALES - GENERAL: PAINLEVEL_OUTOF10: 4

## 2024-10-18 ENCOUNTER — OFFICE VISIT (OUTPATIENT)
Age: 64
End: 2024-10-18
Payer: MEDICARE

## 2024-10-18 VITALS
BODY MASS INDEX: 26.51 KG/M2 | WEIGHT: 200 LBS | HEIGHT: 73 IN | OXYGEN SATURATION: 95 % | TEMPERATURE: 97.4 F | HEART RATE: 71 BPM

## 2024-10-18 DIAGNOSIS — M47.812 SPONDYLOSIS WITHOUT MYELOPATHY OR RADICULOPATHY, CERVICAL REGION: ICD-10-CM

## 2024-10-18 DIAGNOSIS — M51.369 LUMBAR SPINAL STENOSIS DUE TO ADJACENT SEGMENT DISEASE AFTER FUSION PROCEDURE: ICD-10-CM

## 2024-10-18 DIAGNOSIS — M50.30 DDD (DEGENERATIVE DISC DISEASE), CERVICAL: ICD-10-CM

## 2024-10-18 DIAGNOSIS — Z98.1 LUMBAR SPINAL STENOSIS DUE TO ADJACENT SEGMENT DISEASE AFTER FUSION PROCEDURE: ICD-10-CM

## 2024-10-18 DIAGNOSIS — M96.1 LUMBAR POST-LAMINECTOMY SYNDROME: Primary | ICD-10-CM

## 2024-10-18 DIAGNOSIS — M48.061 LUMBAR SPINAL STENOSIS DUE TO ADJACENT SEGMENT DISEASE AFTER FUSION PROCEDURE: ICD-10-CM

## 2024-10-18 PROCEDURE — G8484 FLU IMMUNIZE NO ADMIN: HCPCS | Performed by: NURSE PRACTITIONER

## 2024-10-18 PROCEDURE — G8419 CALC BMI OUT NRM PARAM NOF/U: HCPCS | Performed by: NURSE PRACTITIONER

## 2024-10-18 PROCEDURE — G8427 DOCREV CUR MEDS BY ELIG CLIN: HCPCS | Performed by: NURSE PRACTITIONER

## 2024-10-18 PROCEDURE — 99214 OFFICE O/P EST MOD 30 MIN: CPT | Performed by: NURSE PRACTITIONER

## 2024-10-18 PROCEDURE — 1036F TOBACCO NON-USER: CPT | Performed by: NURSE PRACTITIONER

## 2024-10-18 PROCEDURE — 1123F ACP DISCUSS/DSCN MKR DOCD: CPT | Performed by: NURSE PRACTITIONER

## 2024-10-18 PROCEDURE — 3017F COLORECTAL CA SCREEN DOC REV: CPT | Performed by: NURSE PRACTITIONER

## 2024-10-18 NOTE — PROGRESS NOTES
Loco Parikh presents today for   Chief Complaint   Patient presents with    Lower Back Pain       Is someone accompanying this pt? no    Is the patient using any DME equipment during OV? no    Coordination of Care:  1. Have you been to the ER, urgent care clinic since your last visit? no  Hospitalized since your last visit? no    2. Have you seen or consulted any other health care providers outside of the [facility] since your last visit? no Include any pap smears or colon screening. no        
SpO2 95% Comment: RA  BMI 26.39 kg/m²   Pain Scale: 3/10      We have informed Loco Parikh to notify us for immediate appointment if he has any worsening neurogical symptoms or if an emergency situation presents, then call [phone]    Please note that this dictation was completed with [employer], the computer voice recognition software.  Quite often unanticipated grammatical, syntax, homophones, and other interpretive errors are inadvertently transcribed by the computer software.  Please disregard these errors.  Please excuse any errors that have escaped final proofreading.     Susanne Barber, APRN - NP

## 2024-10-27 ENCOUNTER — APPOINTMENT (OUTPATIENT)
Facility: HOSPITAL | Age: 64
End: 2024-10-27
Payer: MEDICARE

## 2024-10-27 ENCOUNTER — HOSPITAL ENCOUNTER (EMERGENCY)
Facility: HOSPITAL | Age: 64
Discharge: HOME | End: 2024-10-27
Attending: STUDENT IN AN ORGANIZED HEALTH CARE EDUCATION/TRAINING PROGRAM
Payer: MEDICARE

## 2024-10-27 VITALS
TEMPERATURE: 97.9 F | SYSTOLIC BLOOD PRESSURE: 152 MMHG | HEART RATE: 69 BPM | HEIGHT: 73 IN | DIASTOLIC BLOOD PRESSURE: 95 MMHG | OXYGEN SATURATION: 97 % | WEIGHT: 200 LBS | BODY MASS INDEX: 26.51 KG/M2 | RESPIRATION RATE: 19 BRPM

## 2024-10-27 DIAGNOSIS — R07.9 CHEST PAIN, UNSPECIFIED TYPE: Primary | ICD-10-CM

## 2024-10-27 LAB
ANION GAP SERPL CALC-SCNC: 5 MMOL/L (ref 3–18)
BASOPHILS # BLD: 0.1 K/UL (ref 0–0.1)
BASOPHILS NFR BLD: 1 % (ref 0–2)
BUN SERPL-MCNC: 10 MG/DL (ref 7–18)
BUN/CREAT SERPL: 11 (ref 12–20)
CALCIUM SERPL-MCNC: 9.2 MG/DL (ref 8.5–10.1)
CHLORIDE SERPL-SCNC: 106 MMOL/L (ref 100–111)
CO2 SERPL-SCNC: 28 MMOL/L (ref 21–32)
CREAT SERPL-MCNC: 0.88 MG/DL (ref 0.6–1.3)
DIFFERENTIAL METHOD BLD: ABNORMAL
EOSINOPHIL # BLD: 0.1 K/UL (ref 0–0.4)
EOSINOPHIL NFR BLD: 1 % (ref 0–5)
ERYTHROCYTE [DISTWIDTH] IN BLOOD BY AUTOMATED COUNT: 12.3 % (ref 11.6–14.5)
GLUCOSE SERPL-MCNC: 98 MG/DL (ref 74–99)
HCT VFR BLD AUTO: 45.3 % (ref 36–48)
HGB BLD-MCNC: 15.5 G/DL (ref 13–16)
IMM GRANULOCYTES # BLD AUTO: 0.1 K/UL (ref 0–0.04)
IMM GRANULOCYTES NFR BLD AUTO: 1 % (ref 0–0.5)
LYMPHOCYTES # BLD: 3.1 K/UL (ref 0.9–3.6)
LYMPHOCYTES NFR BLD: 28 % (ref 21–52)
MCH RBC QN AUTO: 31.5 PG (ref 24–34)
MCHC RBC AUTO-ENTMCNC: 34.2 G/DL (ref 31–37)
MCV RBC AUTO: 92.1 FL (ref 78–100)
MONOCYTES # BLD: 1 K/UL (ref 0.05–1.2)
MONOCYTES NFR BLD: 9 % (ref 3–10)
NEUTS SEG # BLD: 6.7 K/UL (ref 1.8–8)
NEUTS SEG NFR BLD: 61 % (ref 40–73)
NRBC # BLD: 0 K/UL (ref 0–0.01)
NRBC BLD-RTO: 0 PER 100 WBC
PLATELET # BLD AUTO: 310 K/UL (ref 135–420)
PMV BLD AUTO: 8.5 FL (ref 9.2–11.8)
POTASSIUM SERPL-SCNC: 4.3 MMOL/L (ref 3.5–5.5)
RBC # BLD AUTO: 4.92 M/UL (ref 4.35–5.65)
SODIUM SERPL-SCNC: 139 MMOL/L (ref 136–145)
TROPONIN I SERPL HS-MCNC: 7 NG/L (ref 0–78)
WBC # BLD AUTO: 11 K/UL (ref 4.6–13.2)

## 2024-10-27 PROCEDURE — 99285 EMERGENCY DEPT VISIT HI MDM: CPT

## 2024-10-27 PROCEDURE — 93005 ELECTROCARDIOGRAM TRACING: CPT | Performed by: STUDENT IN AN ORGANIZED HEALTH CARE EDUCATION/TRAINING PROGRAM

## 2024-10-27 PROCEDURE — 85025 COMPLETE CBC W/AUTO DIFF WBC: CPT

## 2024-10-27 PROCEDURE — 80048 BASIC METABOLIC PNL TOTAL CA: CPT

## 2024-10-27 PROCEDURE — 71045 X-RAY EXAM CHEST 1 VIEW: CPT

## 2024-10-27 PROCEDURE — 84484 ASSAY OF TROPONIN QUANT: CPT

## 2024-10-27 ASSESSMENT — PAIN - FUNCTIONAL ASSESSMENT: PAIN_FUNCTIONAL_ASSESSMENT: 0-10

## 2024-10-27 ASSESSMENT — PAIN SCALES - GENERAL: PAINLEVEL_OUTOF10: 4

## 2024-10-27 NOTE — ED PROVIDER NOTES
Hematocrit 45.3 36.0 - 48.0 %    MCV 92.1 78.0 - 100.0 FL    MCH 31.5 24.0 - 34.0 PG    MCHC 34.2 31.0 - 37.0 g/dL    RDW 12.3 11.6 - 14.5 %    Platelets 310 135 - 420 K/uL    MPV 8.5 (L) 9.2 - 11.8 FL    Nucleated RBCs 0.0 0  WBC    nRBC 0.00 0.00 - 0.01 K/uL    Neutrophils % 61 40 - 73 %    Lymphocytes % 28 21 - 52 %    Monocytes % 9 3 - 10 %    Eosinophils % 1 0 - 5 %    Basophils % 1 0 - 2 %    Immature Granulocytes % 1 (H) 0.0 - 0.5 %    Neutrophils Absolute 6.7 1.8 - 8.0 K/UL    Lymphocytes Absolute 3.1 0.9 - 3.6 K/UL    Monocytes Absolute 1.0 0.05 - 1.2 K/UL    Eosinophils Absolute 0.1 0.0 - 0.4 K/UL    Basophils Absolute 0.1 0.0 - 0.1 K/UL    Immature Granulocytes Absolute 0.1 (H) 0.00 - 0.04 K/UL    Differential Type AUTOMATED     Basic Metabolic Panel    Collection Time: 10/27/24  3:20 PM   Result Value Ref Range    Sodium 139 136 - 145 mmol/L    Potassium 4.3 3.5 - 5.5 mmol/L    Chloride 106 100 - 111 mmol/L    CO2 28 21 - 32 mmol/L    Anion Gap 5 3.0 - 18 mmol/L    Glucose 98 74 - 99 mg/dL    BUN 10 7.0 - 18 MG/DL    Creatinine 0.88 0.6 - 1.3 MG/DL    BUN/Creatinine Ratio 11 (L) 12 - 20      Est, Glom Filt Rate >90 >60 ml/min/1.73m2    Calcium 9.2 8.5 - 10.1 MG/DL   Troponin    Collection Time: 10/27/24  3:20 PM   Result Value Ref Range    Troponin, High Sensitivity 7 0 - 78 ng/L       Radiologic Studies -   Non-plain film images such as CT, Ultrasound and MRI are read by the radiologist. Plain radiographic images are visualized and preliminarily interpreted by the emergency physician.    XR CHEST PORTABLE   Final Result   No acute cardiopulmonary pathology.               Electronically signed by Ramy Barrett              Medical Decision Making   I am the first provider for this patient.    I reviewed the vital signs, available nursing notes, past medical history, past surgical history, family history and social history.      Vital Signs-Reviewed the patient's vital signs.    EKG: All EKG's are

## 2024-10-27 NOTE — ED TRIAGE NOTES
Ambulatory to 8 with c/o left upper chest wall pain x2-3 days, denies SOB or any other sx's. Seen at [facility] and advised to come to ED for further testing.

## 2024-10-27 NOTE — DISCHARGE INSTRUCTIONS
Follow-up with your primary care doctor.  Return to the emergency department for any new or worsening symptoms.

## 2024-10-28 LAB
EKG ATRIAL RATE: 65 BPM
EKG DIAGNOSIS: NORMAL
EKG P AXIS: 51 DEGREES
EKG P-R INTERVAL: 156 MS
EKG Q-T INTERVAL: 432 MS
EKG QRS DURATION: 98 MS
EKG QTC CALCULATION (BAZETT): 449 MS
EKG R AXIS: 34 DEGREES
EKG T AXIS: 46 DEGREES
EKG VENTRICULAR RATE: 65 BPM

## 2024-10-28 PROCEDURE — 93010 ELECTROCARDIOGRAM REPORT: CPT | Performed by: INTERNAL MEDICINE

## 2025-01-23 DIAGNOSIS — M62.838 OTHER MUSCLE SPASM: ICD-10-CM

## 2025-01-23 DIAGNOSIS — M47.812 SPONDYLOSIS WITHOUT MYELOPATHY OR RADICULOPATHY, CERVICAL REGION: ICD-10-CM

## 2025-01-23 DIAGNOSIS — M54.2 NECK PAIN: ICD-10-CM

## 2025-01-23 DIAGNOSIS — M96.1 POSTLAMINECTOMY SYNDROME, NOT ELSEWHERE CLASSIFIED: ICD-10-CM

## 2025-01-24 RX ORDER — CYCLOBENZAPRINE HCL 10 MG
TABLET ORAL
Qty: 180 TABLET | Refills: 0 | Status: SHIPPED | OUTPATIENT
Start: 2025-01-24

## 2025-02-07 NOTE — PATIENT INSTRUCTIONS
into a special device.  The device puts lenses of different strengths in front of each eye to see how strong your glasses or contact lenses need to be.  Visual field tests   Your doctor may have you look through special machines.  Or your doctor may simply have you stare straight ahead while they move a finger into and out of your field of vision.  Color vision test   You look at pieces of printed test patterns in various colors. You say what number or symbol you see.  Your doctor may have you trace the number or symbol using a pointer.  How do these tests feel?  There is very little chance of having a problem from this test. If dilating drops are used for a vision test, they may make the eyes sting and cause a medicine taste in the mouth.  Follow-up care is a key part of your treatment and safety. Be sure to make and go to all appointments, and call your doctor if you are having problems. It's also a good idea to know your test results and keep a list of the medicines you take.  Where can you learn more?  Go to https://www.[employer].net/patientEd and enter G551 to learn more about \"Learning About Vision Tests.\"  Current as of: July 31, 2024  Content Version: 14.3  © 2024 [employer].   Care instructions adapted under license by [employer]. If you have questions about a medical condition or this instruction, always ask your healthcare professional. [employer], disclaims any warranty or liability for your use of this information.    Personalized Preventive Plan for Loco Parikh - 2/10/2025  Medicare offers a range of preventive health benefits. Some of the tests and screenings are paid in full while other may be subject to a deductible, co-insurance, and/or copay.  Some of these benefits include a comprehensive review of your medical history including lifestyle, illnesses that may run in your family, and various assessments and screenings as appropriate.  After reviewing your medical

## 2025-02-07 NOTE — PROGRESS NOTES
Chief Complaint   Patient presents with    Medicare AWV      \"Have you been to the ER, urgent care clinic since your last visit?  Hospitalized since your last visit?\"    YES - When: approximately 3 months ago.  Where and Why: on 10/27/2024 [facility] for chest pain .    “Have you seen or consulted any other health care providers outside our system since your last visit?”    YES, under the care of pain management for DDD     No updated immunization noted on [place] Immunization Registry as of 02/07/2025

## 2025-02-10 ENCOUNTER — OFFICE VISIT (OUTPATIENT)
Facility: CLINIC | Age: 65
End: 2025-02-10

## 2025-02-10 VITALS
WEIGHT: 198 LBS | RESPIRATION RATE: 16 BRPM | SYSTOLIC BLOOD PRESSURE: 122 MMHG | HEIGHT: 73 IN | TEMPERATURE: 98.1 F | BODY MASS INDEX: 26.24 KG/M2 | HEART RATE: 76 BPM | DIASTOLIC BLOOD PRESSURE: 84 MMHG | OXYGEN SATURATION: 100 %

## 2025-02-10 VITALS
RESPIRATION RATE: 16 BRPM | TEMPERATURE: 98.1 F | OXYGEN SATURATION: 100 % | SYSTOLIC BLOOD PRESSURE: 122 MMHG | BODY MASS INDEX: 26.24 KG/M2 | WEIGHT: 198 LBS | HEART RATE: 76 BPM | HEIGHT: 73 IN | DIASTOLIC BLOOD PRESSURE: 84 MMHG

## 2025-02-10 DIAGNOSIS — G89.29 CHRONIC BACK PAIN, UNSPECIFIED BACK LOCATION, UNSPECIFIED BACK PAIN LATERALITY: ICD-10-CM

## 2025-02-10 DIAGNOSIS — Z00.00 MEDICARE ANNUAL WELLNESS VISIT, SUBSEQUENT: Primary | ICD-10-CM

## 2025-02-10 DIAGNOSIS — Z71.89 ACP (ADVANCE CARE PLANNING): ICD-10-CM

## 2025-02-10 DIAGNOSIS — E78.2 MIXED HYPERLIPIDEMIA: Primary | ICD-10-CM

## 2025-02-10 DIAGNOSIS — M54.9 CHRONIC BACK PAIN, UNSPECIFIED BACK LOCATION, UNSPECIFIED BACK PAIN LATERALITY: ICD-10-CM

## 2025-02-10 DIAGNOSIS — Z23 NEED FOR PNEUMOCOCCAL 20-VALENT CONJUGATE VACCINATION: ICD-10-CM

## 2025-02-10 DIAGNOSIS — R73.03 PREDIABETES: ICD-10-CM

## 2025-02-10 DIAGNOSIS — R73.01 IMPAIRED FASTING GLUCOSE: ICD-10-CM

## 2025-02-10 DIAGNOSIS — Z12.5 ENCOUNTER FOR SCREENING FOR MALIGNANT NEOPLASM OF PROSTATE: ICD-10-CM

## 2025-02-10 RX ORDER — PRAVASTATIN SODIUM 40 MG
40 TABLET ORAL NIGHTLY
Qty: 90 TABLET | Refills: 3 | Status: SHIPPED | OUTPATIENT
Start: 2025-02-10

## 2025-02-10 SDOH — ECONOMIC STABILITY: FOOD INSECURITY: WITHIN THE PAST 12 MONTHS, THE FOOD YOU BOUGHT JUST DIDN'T LAST AND YOU DIDN'T HAVE MONEY TO GET MORE.: PATIENT DECLINED

## 2025-02-10 SDOH — ECONOMIC STABILITY: FOOD INSECURITY: WITHIN THE PAST 12 MONTHS, YOU WORRIED THAT YOUR FOOD WOULD RUN OUT BEFORE YOU GOT MONEY TO BUY MORE.: PATIENT DECLINED

## 2025-02-10 ASSESSMENT — PATIENT HEALTH QUESTIONNAIRE - PHQ9
SUM OF ALL RESPONSES TO PHQ QUESTIONS 1-9: 0
SUM OF ALL RESPONSES TO PHQ9 QUESTIONS 1 & 2: 0
SUM OF ALL RESPONSES TO PHQ QUESTIONS 1-9: 0
1. LITTLE INTEREST OR PLEASURE IN DOING THINGS: NOT AT ALL
2. FEELING DOWN, DEPRESSED OR HOPELESS: NOT AT ALL
SUM OF ALL RESPONSES TO PHQ QUESTIONS 1-9: 0
SUM OF ALL RESPONSES TO PHQ QUESTIONS 1-9: 0

## 2025-02-10 ASSESSMENT — LIFESTYLE VARIABLES
HOW MANY STANDARD DRINKS CONTAINING ALCOHOL DO YOU HAVE ON A TYPICAL DAY: PATIENT DOES NOT DRINK
HOW OFTEN DO YOU HAVE A DRINK CONTAINING ALCOHOL: NEVER

## 2025-02-10 NOTE — PATIENT INSTRUCTIONS

## 2025-02-10 NOTE — PROGRESS NOTES
Chief Complaint   Patient presents with    Medicare AWV Medicare Annual Wellness Visit    Loco Parikh is here for Medicare AWV    Assessment & Plan   Medicare annual wellness visit, subsequent  ACP (advance care planning)  -     MS Advanced Care Planning (16-30 minutes) [10755]     PCV20 administered.   ACP discussed - declines ACP referral.    AWV annually.        Subjective     Patient's complete Health Risk Assessment and screening values have been reviewed and are found in Flowsheets. The following problems were reviewed today and where indicated follow up appointments were made and/or referrals ordered.    Positive Risk Factor Screenings with Interventions:                   Vision Screen:  Do you have difficulty driving, watching TV, or doing any of your daily activities because of your eyesight?: No  Have you had an eye exam within the past year?: (!) No  Interventions:   See AVS for additional education material                  Objective   Vitals:    02/10/25 0758   BP: 122/84   Site: Right Upper Arm   Position: Sitting   Cuff Size: Large Adult   Pulse: 76   Resp: 16   Temp: 98.1 °F (36.7 °C)   TempSrc: Skin   SpO2: 100%   Weight: 89.8 kg (198 lb)   Height: 1.854 m (6' 1\")      Body mass index is 26.12 kg/m².                 Allergies   Allergen Reactions    Morphine Other (See Comments)     Hallucinations    Oxycodone-Acetaminophen Other (See Comments)     Patients states he doesn't like weaning off of them.      Prior to Visit Medications    Medication Sig Taking? Authorizing Provider   cyclobenzaprine (FLEXERIL) 10 MG tablet TAKE 2 TABLETS BY MOUTH NIGHTLY AS NEEDED FOR MUSCLE SPASM Yes Susanne Barber APRN - NP   vitamin B-1 (THIAMINE) 100 MG tablet Take 1 tablet by mouth daily Yes ProviderConsuelo MD   pravastatin (PRAVACHOL) 40 MG tablet Take 1 tablet by mouth nightly Yes Osiel Lester MD   vitamin E 400 UNIT capsule Take 2 capsules by mouth daily Yes ProviderConsuelo MD   ascorbic

## 2025-02-10 NOTE — PROGRESS NOTES
Chief Complaint   Patient presents with    Hyperlipidemia    IFG/PRE-DM    Chronic Pain      \"Have you been to the ER, urgent care clinic since your last visit?  Hospitalized since your last visit?\"    YES - When: approximately 3 months ago.  Where and Why: on 10/27/2024 [facility] for chest pain .    “Have you seen or consulted any other health care providers outside our system since your last visit?”    YES, under the care of pain management for DDD     Physician order obtained. Patient completed adult immunization consent form.  Allergies, contraindications and recommendations reviewed with patient. PVC - 20  vaccine administered IM left deltoid.  Patient tolerated well.  Patient remained in office for 15 minutes after injection and no adverse reactions were noted.     Lot # [id number]  Exp Date 05/30/2026  NDC # 7903-3268-20    No updated immunization noted on [place] Immunization Registry as of 02/07/2025

## 2025-02-10 NOTE — PROGRESS NOTES
SUBJECTIVE  Chief Complaint   Patient presents with    Hyperlipidemia    IFG/PRE-DM    Chronic Pain     Here for routine follow-up.    Was in the ER for left sided non-exertional chest pains.  He has had no     He is under the care of the spine center and takes flexeril as needed.  He is soon to have a cortisone injection which he says helps.    Taking cholesterol med without side effects.   Needs refills.    ROS - neg other than above.    OBJECTIVE    Blood pressure 122/84, pulse 76, temperature 98.1 °F (36.7 °C), temperature source Skin, resp. rate 16, height 1.854 m (6' 1\"), weight 89.8 kg (198 lb), SpO2 100%.    General:  Alert, cooperative, well appearing, in no apparent distress.  HEENT:  no oral mucosal lesions.  Neck: no masses.   CV:  The heart sounds are regular in rate and rhythm.  There is a normal S1 and S2.  There or no murmurs.    Lungs:  Inspiratory and expiratory efforts are full and unlabored.  Lung sounds are clear and equal to auscultation throughout all lung fields without wheezing, rales, or rhonchi.  Abd: benign  Ext: no swelling.   Psych: normal affect.  Mood good.  Oriented x 3.  Judgement and insight intact.     ASSESSMENT / PLAN   Diagnosis Orders   1. Mixed hyperlipidemia  pravastatin (PRAVACHOL) 40 MG tablet    CBC    Comprehensive Metabolic Panel    Lipid Panel      2. Impaired fasting glucose  Hemoglobin A1C      3. Prediabetes        4. Chronic back pain, unspecified back location, unspecified back pain laterality        5. Encounter for screening for malignant neoplasm of prostate  PSA Screening      6. Need for pneumococcal 20-valent conjugate vaccination  Pneumococcal, PCV20, PREVNAR 20, (age 6w+), IM, PF        Hyperlipidemia - refills of pravastatin per orders.  Fasting labs ordered.      IFG / prediabetes - cont A1c checks annually.  Promote diet and exercise.     Chronic low back pain - cont per specialists.  Currently meds prescribed through spine center and worker's comp

## 2025-02-10 NOTE — ACP (ADVANCE CARE PLANNING)
Advanced Care Planning: Discussed the patient’s choices for care and treatment in case of a health event that adversely affects decision-making abilities. Also discussed the patient’s long-term treatment options. Reviewed with the patient the appropriate state-specific advance directive documents. Reviewed the process of designating a competent adult as an Agent (or -in-fact) that could take make health care decisions for the patient if incompetent. Patient was asked to complete the declaration forms, if they have not already, either acknowledge the forms by a public notary or an eligible witness and provide a signed copy to the practice office.  Time spent (minutes): 16    Declines ACP referral.  Has 2 children.  Not .      Primary Decision Maker: cuca bennett - Child - [phone]    Secondary Decision Maker: flaco stallworth - Child - [phone]

## 2025-02-13 ENCOUNTER — HOSPITAL ENCOUNTER (OUTPATIENT)
Facility: HOSPITAL | Age: 65
Discharge: HOME | End: 2025-02-16
Payer: MEDICARE

## 2025-02-13 DIAGNOSIS — E78.2 MIXED HYPERLIPIDEMIA: ICD-10-CM

## 2025-02-13 DIAGNOSIS — R73.01 IMPAIRED FASTING GLUCOSE: ICD-10-CM

## 2025-02-13 DIAGNOSIS — Z12.5 ENCOUNTER FOR SCREENING FOR MALIGNANT NEOPLASM OF PROSTATE: ICD-10-CM

## 2025-02-13 LAB
ALBUMIN SERPL-MCNC: 3.6 G/DL (ref 3.4–5)
ALBUMIN/GLOB SERPL: 1.1 (ref 0.8–1.7)
ALP SERPL-CCNC: 153 U/L (ref 45–117)
ALT SERPL-CCNC: 39 U/L (ref 16–61)
ANION GAP SERPL CALC-SCNC: 5 MMOL/L (ref 3–18)
AST SERPL-CCNC: 19 U/L (ref 10–38)
BILIRUB SERPL-MCNC: 0.8 MG/DL (ref 0.2–1)
BUN SERPL-MCNC: 10 MG/DL (ref 7–18)
BUN/CREAT SERPL: 11 (ref 12–20)
CALCIUM SERPL-MCNC: 9.3 MG/DL (ref 8.5–10.1)
CHLORIDE SERPL-SCNC: 104 MMOL/L (ref 100–111)
CHOLEST SERPL-MCNC: 180 MG/DL
CO2 SERPL-SCNC: 28 MMOL/L (ref 21–32)
CREAT SERPL-MCNC: 0.94 MG/DL (ref 0.6–1.3)
ERYTHROCYTE [DISTWIDTH] IN BLOOD BY AUTOMATED COUNT: 12.6 % (ref 11.6–14.5)
EST. AVERAGE GLUCOSE BLD GHB EST-MCNC: 120 MG/DL
GLOBULIN SER CALC-MCNC: 3.4 G/DL (ref 2–4)
GLUCOSE SERPL-MCNC: 98 MG/DL (ref 74–99)
HBA1C MFR BLD: 5.8 % (ref 4.2–5.6)
HCT VFR BLD AUTO: 45.9 % (ref 36–48)
HDLC SERPL-MCNC: 44 MG/DL (ref 40–60)
HDLC SERPL: 4.1 (ref 0–5)
HGB BLD-MCNC: 15.5 G/DL (ref 13–16)
LDLC SERPL CALC-MCNC: 103.6 MG/DL (ref 0–100)
LIPID PANEL: ABNORMAL
MCH RBC QN AUTO: 30.8 PG (ref 24–34)
MCHC RBC AUTO-ENTMCNC: 33.8 G/DL (ref 31–37)
MCV RBC AUTO: 91.3 FL (ref 78–100)
NRBC # BLD: 0 K/UL (ref 0–0.01)
NRBC BLD-RTO: 0 PER 100 WBC
PLATELET # BLD AUTO: 381 K/UL (ref 135–420)
PMV BLD AUTO: 9.2 FL (ref 9.2–11.8)
POTASSIUM SERPL-SCNC: 4.1 MMOL/L (ref 3.5–5.5)
PROT SERPL-MCNC: 7 G/DL (ref 6.4–8.2)
PSA SERPL-MCNC: 0.8 NG/ML (ref 0–4)
RBC # BLD AUTO: 5.03 M/UL (ref 4.35–5.65)
SODIUM SERPL-SCNC: 137 MMOL/L (ref 136–145)
TRIGL SERPL-MCNC: 162 MG/DL
VLDLC SERPL CALC-MCNC: 32.4 MG/DL
WBC # BLD AUTO: 12.8 K/UL (ref 4.6–13.2)

## 2025-02-13 PROCEDURE — 36415 COLL VENOUS BLD VENIPUNCTURE: CPT

## 2025-02-13 PROCEDURE — 80053 COMPREHEN METABOLIC PANEL: CPT

## 2025-02-13 PROCEDURE — 80061 LIPID PANEL: CPT

## 2025-02-13 PROCEDURE — 83036 HEMOGLOBIN GLYCOSYLATED A1C: CPT

## 2025-02-13 PROCEDURE — G0103 PSA SCREENING: HCPCS

## 2025-02-13 PROCEDURE — 85027 COMPLETE CBC AUTOMATED: CPT

## 2025-04-15 DIAGNOSIS — M47.812 SPONDYLOSIS WITHOUT MYELOPATHY OR RADICULOPATHY, CERVICAL REGION: ICD-10-CM

## 2025-04-15 DIAGNOSIS — M96.1 POSTLAMINECTOMY SYNDROME, NOT ELSEWHERE CLASSIFIED: ICD-10-CM

## 2025-04-15 DIAGNOSIS — M54.2 NECK PAIN: ICD-10-CM

## 2025-04-15 DIAGNOSIS — M62.838 OTHER MUSCLE SPASM: ICD-10-CM

## 2025-04-16 RX ORDER — CYCLOBENZAPRINE HCL 10 MG
TABLET ORAL
Qty: 180 TABLET | Refills: 0 | Status: SHIPPED | OUTPATIENT
Start: 2025-04-16 | End: 2025-04-18 | Stop reason: SDUPTHER

## 2025-04-18 ENCOUNTER — OFFICE VISIT (OUTPATIENT)
Age: 65
End: 2025-04-18

## 2025-04-18 VITALS
TEMPERATURE: 98 F | WEIGHT: 199.8 LBS | BODY MASS INDEX: 26.36 KG/M2 | HEART RATE: 87 BPM | DIASTOLIC BLOOD PRESSURE: 86 MMHG | OXYGEN SATURATION: 98 % | SYSTOLIC BLOOD PRESSURE: 131 MMHG

## 2025-04-18 DIAGNOSIS — M47.812 SPONDYLOSIS WITHOUT MYELOPATHY OR RADICULOPATHY, CERVICAL REGION: ICD-10-CM

## 2025-04-18 DIAGNOSIS — M96.1 POSTLAMINECTOMY SYNDROME, NOT ELSEWHERE CLASSIFIED: ICD-10-CM

## 2025-04-18 DIAGNOSIS — M54.2 NECK PAIN: ICD-10-CM

## 2025-04-18 DIAGNOSIS — M62.838 OTHER MUSCLE SPASM: ICD-10-CM

## 2025-04-18 RX ORDER — CYCLOBENZAPRINE HCL 10 MG
20 TABLET ORAL NIGHTLY PRN
Qty: 180 TABLET | Refills: 0 | Status: SHIPPED | OUTPATIENT
Start: 2025-07-15

## 2025-04-18 NOTE — PROGRESS NOTES
Loco Parikh presents today for   Chief Complaint   Patient presents with    Back Pain     Back pain the same as last visit       Is someone accompanying this pt? no    Is the patient using any DME equipment during OV? no      Coordination of Care:  1. Have you been to the ER, urgent care clinic since your last visit? no  Hospitalized since your last visit? no    2. Have you seen or consulted any other health care providers outside of the [facility] since your last visit? no Include any pap smears or colon screening. no

## 2025-05-07 NOTE — TELEPHONE ENCOUNTER
Another [employer] PA:    Key: [id number]  Last Name: Estrellita Esparza  : [birth year] Rx Refill Note  Requested Prescriptions     Pending Prescriptions Disp Refills    gabapentin (NEURONTIN) 800 MG tablet [Pharmacy Med Name: GABAPENTIN 800 MG TABLET] 90 tablet      Sig: TAKE 1 TABLET BY MOUTH 3 TIMES A DAY      Last office visit with prescribing clinician: 2/18/2025   Last telemedicine visit with prescribing clinician: Visit date not found   Next office visit with prescribing clinician: 5/7/2025                         Would you like a call back once the refill request has been completed: [] Yes [] No    If the office needs to give you a call back, can they leave a voicemail: [] Yes [] No    Cheli Hogue MA  05/07/25, 08:29 EDT

## (undated) DEVICE — TRAY MYEL SFTY +

## (undated) DEVICE — MEDIA CONTRAST 10ML 200MG/ML 41%

## (undated) DEVICE — (D)BNDG ADHESIVE FABRIC 3/4X3 -- DISC BY MFR USE ITEM 357960

## (undated) DEVICE — SYR 10ML LUER LOK 1/5ML GRAD --